# Patient Record
Sex: FEMALE | Race: WHITE | Employment: UNEMPLOYED | URBAN - METROPOLITAN AREA
[De-identification: names, ages, dates, MRNs, and addresses within clinical notes are randomized per-mention and may not be internally consistent; named-entity substitution may affect disease eponyms.]

---

## 2017-01-16 ENCOUNTER — HOSPITAL ENCOUNTER (OUTPATIENT)
Dept: INFUSION THERAPY | Age: 57
Discharge: HOME | End: 2017-01-16
Payer: COMMERCIAL

## 2017-01-16 VITALS
RESPIRATION RATE: 18 BRPM | DIASTOLIC BLOOD PRESSURE: 53 MMHG | SYSTOLIC BLOOD PRESSURE: 93 MMHG | WEIGHT: 208.6 LBS | TEMPERATURE: 98 F | BODY MASS INDEX: 36.95 KG/M2 | HEART RATE: 76 BPM

## 2017-01-16 PROCEDURE — 77030003560 HC NDL HUBR BARD -A

## 2017-01-16 PROCEDURE — 74011250636 HC RX REV CODE- 250/636: Performed by: INTERNAL MEDICINE

## 2017-01-16 PROCEDURE — 96415 CHEMO IV INFUSION ADDL HR: CPT

## 2017-01-16 PROCEDURE — 96413 CHEMO IV INFUSION 1 HR: CPT

## 2017-01-16 RX ORDER — HEPARIN 100 UNIT/ML
500 SYRINGE INTRAVENOUS AS NEEDED
Status: DISPENSED | OUTPATIENT
Start: 2017-01-16 | End: 2017-01-16

## 2017-01-16 RX ORDER — SODIUM CHLORIDE 9 MG/ML
250 INJECTION, SOLUTION INTRAVENOUS ONCE
Status: COMPLETED | OUTPATIENT
Start: 2017-01-16 | End: 2017-01-16

## 2017-01-16 RX ORDER — SODIUM CHLORIDE 0.9 % (FLUSH) 0.9 %
10 SYRINGE (ML) INJECTION AS NEEDED
Status: ACTIVE | OUTPATIENT
Start: 2017-01-16 | End: 2017-01-16

## 2017-01-16 RX ORDER — ACETAMINOPHEN 325 MG/1
650 TABLET ORAL
Status: DISCONTINUED | OUTPATIENT
Start: 2017-01-16 | End: 2017-01-16

## 2017-01-16 RX ORDER — DIPHENHYDRAMINE HCL 50 MG
50 CAPSULE ORAL
Status: DISCONTINUED | OUTPATIENT
Start: 2017-01-16 | End: 2017-01-16

## 2017-01-16 RX ADMIN — Medication 10 ML: at 09:45

## 2017-01-16 RX ADMIN — SODIUM CHLORIDE 250 ML: 900 INJECTION, SOLUTION INTRAVENOUS at 09:45

## 2017-01-16 RX ADMIN — Medication 10 ML: at 12:40

## 2017-01-16 RX ADMIN — INFLIXIMAB 900 MG: 100 INJECTION, POWDER, LYOPHILIZED, FOR SOLUTION INTRAVENOUS at 10:25

## 2017-01-16 RX ADMIN — SODIUM CHLORIDE, PRESERVATIVE FREE 500 UNITS: 5 INJECTION INTRAVENOUS at 12:40

## 2017-01-16 NOTE — PROGRESS NOTES
Problem: Knowledge Deficit  Goal: *Verbalizes understanding and describes medication purposes and frequencies  Outcome: Progressing Towards Goal  Verbalizes/demonstrates understanding of purpose/procedure/potential side effects of remicade.

## 2017-01-16 NOTE — PROGRESS NOTES
Pt arrived ambulatory today at 0925, to receive IV Remicade. Pt tolerated without difficulty. Patient discharged via ambulatory accompanied by self. Instructed to notify physician of any problems, questions or concerns. Allowed opportunity for patient/family to ask questions. Verbalized understanding. Next appointment is Feb 27at 0915 with Lev Reyes.

## 2017-02-27 ENCOUNTER — HOSPITAL ENCOUNTER (OUTPATIENT)
Dept: INFUSION THERAPY | Age: 57
Discharge: HOME | End: 2017-02-27
Payer: COMMERCIAL

## 2017-02-27 VITALS
OXYGEN SATURATION: 97 % | RESPIRATION RATE: 16 BRPM | SYSTOLIC BLOOD PRESSURE: 99 MMHG | TEMPERATURE: 97.4 F | BODY MASS INDEX: 36.67 KG/M2 | DIASTOLIC BLOOD PRESSURE: 63 MMHG | HEART RATE: 73 BPM | WEIGHT: 207 LBS

## 2017-02-27 PROCEDURE — 74011250636 HC RX REV CODE- 250/636: Performed by: INTERNAL MEDICINE

## 2017-02-27 PROCEDURE — 96413 CHEMO IV INFUSION 1 HR: CPT

## 2017-02-27 PROCEDURE — 96415 CHEMO IV INFUSION ADDL HR: CPT

## 2017-02-27 PROCEDURE — 77030003560 HC NDL HUBR BARD -A

## 2017-02-27 RX ORDER — HEPARIN 100 UNIT/ML
500 SYRINGE INTRAVENOUS AS NEEDED
Status: DISPENSED | OUTPATIENT
Start: 2017-02-27 | End: 2017-02-27

## 2017-02-27 RX ORDER — DIPHENHYDRAMINE HCL 50 MG
50 CAPSULE ORAL
Status: ACTIVE | OUTPATIENT
Start: 2017-02-27 | End: 2017-02-27

## 2017-02-27 RX ORDER — SODIUM CHLORIDE 0.9 % (FLUSH) 0.9 %
20 SYRINGE (ML) INJECTION AS NEEDED
Status: DISCONTINUED | OUTPATIENT
Start: 2017-02-27 | End: 2017-03-03 | Stop reason: HOSPADM

## 2017-02-27 RX ORDER — SODIUM CHLORIDE 9 MG/ML
25 INJECTION, SOLUTION INTRAVENOUS ONCE
Status: COMPLETED | OUTPATIENT
Start: 2017-02-27 | End: 2017-02-27

## 2017-02-27 RX ORDER — ACETAMINOPHEN 325 MG/1
650 TABLET ORAL
Status: ACTIVE | OUTPATIENT
Start: 2017-02-27 | End: 2017-02-27

## 2017-02-27 RX ADMIN — INFLIXIMAB 900 MG: 100 INJECTION, POWDER, LYOPHILIZED, FOR SOLUTION INTRAVENOUS at 10:35

## 2017-02-27 RX ADMIN — SODIUM CHLORIDE 25 ML/HR: 900 INJECTION, SOLUTION INTRAVENOUS at 10:05

## 2017-02-27 RX ADMIN — Medication 20 ML: at 12:50

## 2017-02-27 RX ADMIN — SODIUM CHLORIDE, PRESERVATIVE FREE 500 UNITS: 5 INJECTION INTRAVENOUS at 12:50

## 2017-02-27 NOTE — PROGRESS NOTES
Pt arrived ambulatory for remicade, she denies new complaints. Pt took her own premeds this morning. Pt tolerated infusion without incident. Pt dc'd stable, to return to [facility] on 4/10 at 1000.

## 2017-02-27 NOTE — PROGRESS NOTES
Massage THERAPY: Daily Note    Referring Physician: Norma Ayala MD  Medical/Referring Diagnosis: Ulcerative colitis ([facility] [K51.90]   Precautions/Allergies: Codeine  SUBJECTIVE:  Present Symptoms: no discomfort but loves idea of massage    Pre-Treatment Pain: 2/10  Past Medical History:    Ms. Julianna Johnson  has a past medical history of Depression; Disc prolapse; DJD (degenerative joint disease); GERD (gastroesophageal reflux disease); Hypertension; Obesity (BMI 30-39.9); Osteoarthritis; Steroid-induced diabetes mellitus ([facility] (6/5/2013); Steroid-induced diabetes mellitus ([facility] (6/5/2013); and Ulcerative colitis ([facility] She also has no past medical history of DEMENTIA; Difficult intubation; Infectious disease; Malignant hyperthermia due to anesthesia; Nausea & vomiting; Neurological disorder; Pseudocholinesterase deficiency; Sleep disorder; or Unspecified adverse effect of anesthesia. Ms. Julianna Johnson  has a past surgical history that includes inner ear surgery proc unlisted (74,36,36); colonoscopy (2009); tonsillectomy (1971); heent (1974); heent (1971); vascular access (2013); vascular surgery procedure unlist (06/5/2013 Sarai); and  (N/A, 5/11/2016). Current Medications:       Current Outpatient Prescriptions:     predniSONE (STERAPRED DS) 10 mg dose pack, Take  by mouth See Admin Instructions. See administration instruction per 10mg dose pack, Disp: , Rfl:     mesalamine DR (DELZICOL) 400 mg cpDR DR capsule, Take 400 mg by mouth two (2) times a day. Indications: ULCERATIVE COLITIS, Disp: , Rfl:     pravastatin (PRAVACHOL) 80 mg tablet, Take 80 mg by mouth nightly., Disp: , Rfl:     valsartan-hydrochlorothiazide (DIOVAN-HCT) 160-25 mg per tablet, Take 1 Tab by mouth every morning., Disp: , Rfl:     LORazepam (ATIVAN) 1 mg tablet, Take 1 mg by mouth every twelve (12) hours as needed for Anxiety. , Disp: , Rfl:     Dexlansoprazole 60 mg CpDB, Take 60 mg by mouth nightly., Disp: , Rfl:     INFLIXIMAB (REMICADE IV), by IntraVENous route every six (6) weeks. , Disp: , Rfl:     multivitamins-minerals-lutein (CENTRUM SILVER) Tab, Take 1 Tab by mouth nightly., Disp: , Rfl:     Bifidobacterium Infantis (ALIGN) 4 mg cap, Take 1 Tab by mouth every morning., Disp: , Rfl:     ropinirole (REQUIP) 0.5 mg tablet, Take 0.5 mg by mouth nightly. Indications: takes nightly, Disp: , Rfl:     acetaminophen (TYLENOL ARTHRITIS) 650 mg CR tablet, Take 650 mg by mouth every six (6) hours as needed for Pain., Disp: , Rfl:     sertraline (ZOLOFT) 100 mg tablet, Take 100 mg by mouth every morning., Disp: , Rfl:     Current Facility-Administered Medications:     central line flush (saline) syringe 20 mL, 20 mL, InterCATHeter, PRN, Hugo Cedillo MD    heparin (porcine) pf 500 Units, 500 Units, InterCATHeter, PRN, Hugo Cedillo MD    inFLIXimab (REMICADE) 900 mg in 0.9% sodium chloride 250 mL infusion, 900 mg, IntraVENous, [id number], Hugo Cedillo MD, Last Rate: 125 mL/hr at 02/27/17 1035, 900 mg at 02/27/17 1035    acetaminophen (TYLENOL) tablet 650 mg, 650 mg, Oral, Q4H PRN, Hugo Cedillo MD    diphenhydrAMINE (BENADRYL) capsule 50 mg, 50 mg, Oral, Q4H PRN, Hugo Cedillo MD       OBJECTIVE/ASSESSMENT:  Objective Measure: n/a    Observations of Patient:  Enjoyed massage, a bit concerned about rash on legs  Response To Treatment: very positive, enjoyed having feet moisturized   Post-Treatment Pain: 1/10  TREATMENT:    (In addition to Assessment/Re-Assessment sessions the following treatments were rendered)  Treatment Provided:  [x]  Soft tissue massage  []  Healing Touch   Location: bilateral lower legs & feet  Patient Position: seated  Time: 20 minutes    PLAN OF CARE:    []  I will follow up with this patient as needed. [x]  No follow up visit necessary.     Thank you for this referral.  Fabio Bush

## 2017-04-10 ENCOUNTER — HOSPITAL ENCOUNTER (OUTPATIENT)
Dept: INFUSION THERAPY | Age: 57
End: 2017-04-10

## 2017-05-22 ENCOUNTER — HOSPITAL ENCOUNTER (OUTPATIENT)
Dept: INFUSION THERAPY | Age: 57
End: 2017-05-22

## 2017-07-03 ENCOUNTER — APPOINTMENT (OUTPATIENT)
Dept: INFUSION THERAPY | Age: 57
End: 2017-07-03

## 2021-11-18 ENCOUNTER — HOSPITAL ENCOUNTER (OUTPATIENT)
Dept: INFUSION THERAPY | Age: 61
Discharge: HOME | End: 2021-11-18
Payer: COMMERCIAL

## 2021-11-18 VITALS
OXYGEN SATURATION: 98 % | DIASTOLIC BLOOD PRESSURE: 50 MMHG | RESPIRATION RATE: 16 BRPM | TEMPERATURE: 98 F | SYSTOLIC BLOOD PRESSURE: 94 MMHG | HEART RATE: 90 BPM

## 2021-11-18 PROCEDURE — 74011250636 HC RX REV CODE- 250/636: Performed by: PHYSICIAN ASSISTANT

## 2021-11-18 PROCEDURE — 96360 HYDRATION IV INFUSION INIT: CPT

## 2021-11-18 RX ORDER — SODIUM CHLORIDE 0.9 % (FLUSH) 0.9 %
10 SYRINGE (ML) INJECTION AS NEEDED
Status: DISCONTINUED | OUTPATIENT
Start: 2021-11-18 | End: 2021-11-20 | Stop reason: HOSPADM

## 2021-11-18 RX ADMIN — Medication 10 ML: at 17:58

## 2021-11-18 RX ADMIN — Medication 10 ML: at 16:55

## 2021-11-18 RX ADMIN — SODIUM CHLORIDE 1000 ML: 900 INJECTION, SOLUTION INTRAVENOUS at 16:55

## 2021-11-18 NOTE — PROGRESS NOTES
Pt arrived ambulatory to [facility]. Port accessed with good blood return. NS 1 L infusing. Pt has no future appt with [facility] at this time. Port flushed and de accessed. Pt discharge ambulatory.

## 2022-08-08 ENCOUNTER — HOSPITAL ENCOUNTER (INPATIENT)
Age: 62
LOS: 16 days | Discharge: ANOTHER HOSPITAL | DRG: 386 | End: 2022-08-24
Attending: EMERGENCY MEDICINE | Admitting: INTERNAL MEDICINE
Payer: COMMERCIAL

## 2022-08-08 DIAGNOSIS — N18.9 ACUTE RENAL FAILURE SUPERIMPOSED ON CHRONIC KIDNEY DISEASE, UNSPECIFIED CKD STAGE, UNSPECIFIED ACUTE RENAL FAILURE TYPE (HCC): Primary | ICD-10-CM

## 2022-08-08 DIAGNOSIS — E86.0 DEHYDRATION: ICD-10-CM

## 2022-08-08 DIAGNOSIS — K50.118 CROHN'S DISEASE OF LARGE INTESTINE WITH OTHER COMPLICATION (HCC): Chronic | ICD-10-CM

## 2022-08-08 DIAGNOSIS — N17.9 ACUTE RENAL FAILURE SUPERIMPOSED ON CHRONIC KIDNEY DISEASE, UNSPECIFIED CKD STAGE, UNSPECIFIED ACUTE RENAL FAILURE TYPE (HCC): Primary | ICD-10-CM

## 2022-08-08 DIAGNOSIS — E87.6 HYPOKALEMIA: ICD-10-CM

## 2022-08-08 DIAGNOSIS — R19.7 DIARRHEA, UNSPECIFIED TYPE: ICD-10-CM

## 2022-08-08 PROBLEM — I10 HYPERTENSION: Chronic | Status: ACTIVE | Noted: 2022-08-08

## 2022-08-08 PROBLEM — K50.90 CROHN'S DISEASE (HCC): Chronic | Status: ACTIVE | Noted: 2022-08-08

## 2022-08-08 PROBLEM — F32.A DEPRESSION: Chronic | Status: ACTIVE | Noted: 2022-08-08

## 2022-08-08 PROBLEM — E87.1 HYPONATREMIA: Status: ACTIVE | Noted: 2022-08-08

## 2022-08-08 PROBLEM — E78.5 HYPERLIPIDEMIA: Chronic | Status: ACTIVE | Noted: 2022-08-08

## 2022-08-08 LAB
ALBUMIN SERPL-MCNC: 2.2 G/DL (ref 3.2–4.6)
ALBUMIN/GLOB SERPL: 0.4 {RATIO} (ref 1.2–3.5)
ALP SERPL-CCNC: 80 U/L (ref 50–136)
ALT SERPL-CCNC: 14 U/L (ref 12–65)
ANION GAP SERPL CALC-SCNC: 11 MMOL/L (ref 7–16)
ANION GAP SERPL CALC-SCNC: 7 MMOL/L (ref 7–16)
AST SERPL-CCNC: 15 U/L (ref 15–37)
BASOPHILS # BLD: 0 K/UL (ref 0–0.2)
BASOPHILS NFR BLD: 0 % (ref 0–2)
BILIRUB SERPL-MCNC: 0.3 MG/DL (ref 0.2–1.1)
BUN SERPL-MCNC: 52 MG/DL (ref 8–23)
BUN SERPL-MCNC: 52 MG/DL (ref 8–23)
CALCIUM SERPL-MCNC: 8.3 MG/DL (ref 8.3–10.4)
CALCIUM SERPL-MCNC: 8.3 MG/DL (ref 8.3–10.4)
CHLORIDE SERPL-SCNC: 92 MMOL/L (ref 98–107)
CHLORIDE SERPL-SCNC: 95 MMOL/L (ref 98–107)
CO2 SERPL-SCNC: 24 MMOL/L (ref 21–32)
CO2 SERPL-SCNC: 24 MMOL/L (ref 21–32)
CREAT SERPL-MCNC: 3.6 MG/DL (ref 0.6–1)
CREAT SERPL-MCNC: 3.8 MG/DL (ref 0.6–1)
DIFFERENTIAL METHOD BLD: ABNORMAL
EOSINOPHIL # BLD: 0 K/UL (ref 0–0.8)
EOSINOPHIL NFR BLD: 0 % (ref 0.5–7.8)
ERYTHROCYTE [DISTWIDTH] IN BLOOD BY AUTOMATED COUNT: 12.8 % (ref 11.9–14.6)
GLOBULIN SER CALC-MCNC: 4.9 G/DL (ref 2.3–3.5)
GLUCOSE SERPL-MCNC: 96 MG/DL (ref 65–100)
GLUCOSE SERPL-MCNC: 96 MG/DL (ref 65–100)
HCT VFR BLD AUTO: 28.8 % (ref 35.8–46.3)
HGB BLD-MCNC: 10 G/DL (ref 11.7–15.4)
IMM GRANULOCYTES # BLD AUTO: 0.2 K/UL (ref 0–0.5)
IMM GRANULOCYTES NFR BLD AUTO: 2 % (ref 0–5)
IRON SERPL-MCNC: 30 UG/DL (ref 35–150)
LIPASE SERPL-CCNC: 43 U/L (ref 73–393)
LYMPHOCYTES # BLD: 0.6 K/UL (ref 0.5–4.6)
LYMPHOCYTES NFR BLD: 6 % (ref 13–44)
MAGNESIUM SERPL-MCNC: 0.7 MG/DL (ref 1.8–2.4)
MCH RBC QN AUTO: 30.4 PG (ref 26.1–32.9)
MCHC RBC AUTO-ENTMCNC: 34.7 G/DL (ref 31.4–35)
MCV RBC AUTO: 87.5 FL (ref 79.6–97.8)
MONOCYTES # BLD: 0.7 K/UL (ref 0.1–1.3)
MONOCYTES NFR BLD: 8 % (ref 4–12)
NEUTS SEG # BLD: 7.8 K/UL (ref 1.7–8.2)
NEUTS SEG NFR BLD: 84 % (ref 43–78)
NRBC # BLD: 0 K/UL (ref 0–0.2)
PLATELET # BLD AUTO: 262 K/UL (ref 150–450)
PMV BLD AUTO: 8.2 FL (ref 9.4–12.3)
POTASSIUM SERPL-SCNC: 2.8 MMOL/L (ref 3.5–5.1)
POTASSIUM SERPL-SCNC: 3.3 MMOL/L (ref 3.5–5.1)
PROT SERPL-MCNC: 7.1 G/DL (ref 6.3–8.2)
RBC # BLD AUTO: 3.29 M/UL (ref 4.05–5.2)
SODIUM SERPL-SCNC: 126 MMOL/L (ref 136–145)
SODIUM SERPL-SCNC: 127 MMOL/L (ref 136–145)
WBC # BLD AUTO: 9.4 K/UL (ref 4.3–11.1)

## 2022-08-08 PROCEDURE — 80053 COMPREHEN METABOLIC PANEL: CPT

## 2022-08-08 PROCEDURE — 6370000000 HC RX 637 (ALT 250 FOR IP): Performed by: INTERNAL MEDICINE

## 2022-08-08 PROCEDURE — 2580000003 HC RX 258: Performed by: INTERNAL MEDICINE

## 2022-08-08 PROCEDURE — 6370000000 HC RX 637 (ALT 250 FOR IP): Performed by: HOSPITALIST

## 2022-08-08 PROCEDURE — 83540 ASSAY OF IRON: CPT

## 2022-08-08 PROCEDURE — 87324 CLOSTRIDIUM AG IA: CPT

## 2022-08-08 PROCEDURE — 99285 EMERGENCY DEPT VISIT HI MDM: CPT

## 2022-08-08 PROCEDURE — 83735 ASSAY OF MAGNESIUM: CPT

## 2022-08-08 PROCEDURE — 2580000003 HC RX 258: Performed by: EMERGENCY MEDICINE

## 2022-08-08 PROCEDURE — 6360000002 HC RX W HCPCS: Performed by: EMERGENCY MEDICINE

## 2022-08-08 PROCEDURE — 1100000000 HC RM PRIVATE

## 2022-08-08 PROCEDURE — 87046 STOOL CULTR AEROBIC BACT EA: CPT

## 2022-08-08 PROCEDURE — 6360000002 HC RX W HCPCS: Performed by: INTERNAL MEDICINE

## 2022-08-08 PROCEDURE — 82746 ASSAY OF FOLIC ACID SERUM: CPT

## 2022-08-08 PROCEDURE — 36415 COLL VENOUS BLD VENIPUNCTURE: CPT

## 2022-08-08 PROCEDURE — 83690 ASSAY OF LIPASE: CPT

## 2022-08-08 PROCEDURE — 85025 COMPLETE CBC W/AUTO DIFF WBC: CPT

## 2022-08-08 PROCEDURE — 96361 HYDRATE IV INFUSION ADD-ON: CPT

## 2022-08-08 PROCEDURE — 96375 TX/PRO/DX INJ NEW DRUG ADDON: CPT

## 2022-08-08 PROCEDURE — 82728 ASSAY OF FERRITIN: CPT

## 2022-08-08 PROCEDURE — 82607 VITAMIN B-12: CPT

## 2022-08-08 PROCEDURE — 96365 THER/PROPH/DIAG IV INF INIT: CPT

## 2022-08-08 RX ORDER — ONDANSETRON 2 MG/ML
4 INJECTION INTRAMUSCULAR; INTRAVENOUS
Status: COMPLETED | OUTPATIENT
Start: 2022-08-08 | End: 2022-08-08

## 2022-08-08 RX ORDER — IRBESARTAN AND HYDROCHLOROTHIAZIDE 150; 12.5 MG/1; MG/1
1 TABLET, FILM COATED ORAL EVERY OTHER DAY
Status: ON HOLD | COMMUNITY
End: 2022-08-24 | Stop reason: HOSPADM

## 2022-08-08 RX ORDER — MORPHINE SULFATE 4 MG/ML
2 INJECTION INTRAVENOUS ONCE
Status: COMPLETED | OUTPATIENT
Start: 2022-08-08 | End: 2022-08-08

## 2022-08-08 RX ORDER — SODIUM CHLORIDE 0.9 % (FLUSH) 0.9 %
5-40 SYRINGE (ML) INJECTION PRN
Status: DISCONTINUED | OUTPATIENT
Start: 2022-08-08 | End: 2022-08-24 | Stop reason: HOSPADM

## 2022-08-08 RX ORDER — ROPINIROLE 0.5 MG/1
0.5 TABLET, FILM COATED ORAL NIGHTLY
Status: DISCONTINUED | OUTPATIENT
Start: 2022-08-08 | End: 2022-08-24 | Stop reason: HOSPADM

## 2022-08-08 RX ORDER — ONDANSETRON 2 MG/ML
4 INJECTION INTRAMUSCULAR; INTRAVENOUS EVERY 6 HOURS PRN
Status: DISCONTINUED | OUTPATIENT
Start: 2022-08-08 | End: 2022-08-24 | Stop reason: HOSPADM

## 2022-08-08 RX ORDER — BUPROPION HYDROCHLORIDE 150 MG/1
150 TABLET ORAL EVERY MORNING
COMMUNITY

## 2022-08-08 RX ORDER — ONDANSETRON 4 MG/1
4 TABLET, ORALLY DISINTEGRATING ORAL EVERY 8 HOURS PRN
Status: DISCONTINUED | OUTPATIENT
Start: 2022-08-08 | End: 2022-08-24 | Stop reason: HOSPADM

## 2022-08-08 RX ORDER — SODIUM CHLORIDE 0.9 % (FLUSH) 0.9 %
5-40 SYRINGE (ML) INJECTION EVERY 12 HOURS SCHEDULED
Status: DISCONTINUED | OUTPATIENT
Start: 2022-08-08 | End: 2022-08-24 | Stop reason: HOSPADM

## 2022-08-08 RX ORDER — BUPROPION HYDROCHLORIDE 150 MG/1
150 TABLET, EXTENDED RELEASE ORAL EVERY MORNING
Status: DISCONTINUED | OUTPATIENT
Start: 2022-08-09 | End: 2022-08-24 | Stop reason: HOSPADM

## 2022-08-08 RX ORDER — ACETAMINOPHEN 325 MG/1
650 TABLET ORAL EVERY 6 HOURS PRN
Status: DISCONTINUED | OUTPATIENT
Start: 2022-08-08 | End: 2022-08-24 | Stop reason: HOSPADM

## 2022-08-08 RX ORDER — SODIUM CHLORIDE 9 MG/ML
INJECTION, SOLUTION INTRAVENOUS PRN
Status: DISCONTINUED | OUTPATIENT
Start: 2022-08-08 | End: 2022-08-24 | Stop reason: HOSPADM

## 2022-08-08 RX ORDER — ACETAMINOPHEN 650 MG/1
650 SUPPOSITORY RECTAL EVERY 6 HOURS PRN
Status: DISCONTINUED | OUTPATIENT
Start: 2022-08-08 | End: 2022-08-24 | Stop reason: HOSPADM

## 2022-08-08 RX ORDER — SODIUM CHLORIDE, SODIUM LACTATE, POTASSIUM CHLORIDE, AND CALCIUM CHLORIDE .6; .31; .03; .02 G/100ML; G/100ML; G/100ML; G/100ML
1000 INJECTION, SOLUTION INTRAVENOUS
Status: COMPLETED | OUTPATIENT
Start: 2022-08-08 | End: 2022-08-08

## 2022-08-08 RX ORDER — SODIUM CHLORIDE 9 MG/ML
INJECTION, SOLUTION INTRAVENOUS CONTINUOUS
Status: ACTIVE | OUTPATIENT
Start: 2022-08-08 | End: 2022-08-14

## 2022-08-08 RX ORDER — MORPHINE SULFATE 2 MG/ML
2 INJECTION, SOLUTION INTRAMUSCULAR; INTRAVENOUS EVERY 4 HOURS PRN
Status: DISCONTINUED | OUTPATIENT
Start: 2022-08-08 | End: 2022-08-24 | Stop reason: HOSPADM

## 2022-08-08 RX ORDER — TRAMADOL HYDROCHLORIDE 50 MG/1
50 TABLET ORAL EVERY 8 HOURS PRN
COMMUNITY

## 2022-08-08 RX ORDER — PANTOPRAZOLE SODIUM 40 MG/1
40 TABLET, DELAYED RELEASE ORAL
Status: DISCONTINUED | OUTPATIENT
Start: 2022-08-09 | End: 2022-08-24 | Stop reason: HOSPADM

## 2022-08-08 RX ORDER — POLYETHYLENE GLYCOL 3350 17 G/17G
17 POWDER, FOR SOLUTION ORAL DAILY PRN
Status: DISCONTINUED | OUTPATIENT
Start: 2022-08-08 | End: 2022-08-24 | Stop reason: HOSPADM

## 2022-08-08 RX ORDER — SERTRALINE HYDROCHLORIDE 25 MG/1
100 TABLET, FILM COATED ORAL DAILY
Status: DISCONTINUED | OUTPATIENT
Start: 2022-08-09 | End: 2022-08-24 | Stop reason: HOSPADM

## 2022-08-08 RX ORDER — POTASSIUM CHLORIDE 7.45 MG/ML
10 INJECTION INTRAVENOUS ONCE
Status: COMPLETED | OUTPATIENT
Start: 2022-08-08 | End: 2022-08-08

## 2022-08-08 RX ORDER — PRAVASTATIN SODIUM 80 MG/1
80 TABLET ORAL NIGHTLY
Status: DISCONTINUED | OUTPATIENT
Start: 2022-08-09 | End: 2022-08-24 | Stop reason: HOSPADM

## 2022-08-08 RX ADMIN — MORPHINE SULFATE 2 MG: 4 INJECTION INTRAVENOUS at 16:36

## 2022-08-08 RX ADMIN — ROPINIROLE HYDROCHLORIDE 0.5 MG: 0.5 TABLET, FILM COATED ORAL at 21:22

## 2022-08-08 RX ADMIN — ONDANSETRON 4 MG: 2 INJECTION INTRAMUSCULAR; INTRAVENOUS at 16:15

## 2022-08-08 RX ADMIN — MORPHINE SULFATE 2 MG: 2 INJECTION, SOLUTION INTRAMUSCULAR; INTRAVENOUS at 21:22

## 2022-08-08 RX ADMIN — SODIUM CHLORIDE, PRESERVATIVE FREE 5 ML: 5 INJECTION INTRAVENOUS at 21:22

## 2022-08-08 RX ADMIN — POTASSIUM CHLORIDE 10 MEQ: 7.46 INJECTION, SOLUTION INTRAVENOUS at 16:55

## 2022-08-08 RX ADMIN — NYSTATIN 500000 UNITS: 100000 SUSPENSION ORAL at 21:22

## 2022-08-08 RX ADMIN — SODIUM CHLORIDE, POTASSIUM CHLORIDE, SODIUM LACTATE AND CALCIUM CHLORIDE 1000 ML: 600; 310; 30; 20 INJECTION, SOLUTION INTRAVENOUS at 16:15

## 2022-08-08 RX ADMIN — SODIUM CHLORIDE: 9 INJECTION, SOLUTION INTRAVENOUS at 20:00

## 2022-08-08 ASSESSMENT — ENCOUNTER SYMPTOMS
RECENT COUGH: 0
VOMITING: 0
DIARRHEA: 1
ABDOMINAL PAIN: 1

## 2022-08-08 ASSESSMENT — PAIN SCALES - GENERAL
PAINLEVEL_OUTOF10: 10
PAINLEVEL_OUTOF10: 9
PAINLEVEL_OUTOF10: 4

## 2022-08-08 ASSESSMENT — PAIN DESCRIPTION - DESCRIPTORS: DESCRIPTORS: ACHING

## 2022-08-08 ASSESSMENT — PAIN - FUNCTIONAL ASSESSMENT: PAIN_FUNCTIONAL_ASSESSMENT: 0-10

## 2022-08-08 ASSESSMENT — PAIN DESCRIPTION - ORIENTATION: ORIENTATION: MID

## 2022-08-08 ASSESSMENT — PAIN DESCRIPTION - LOCATION: LOCATION: ABDOMEN

## 2022-08-08 NOTE — ED PROVIDER NOTES
days  Timing:  Intermittent  Progression:  Worsening  Relieved by:  Nothing  Worsened by:  Nothing  Ineffective treatments:  Anti-motility medications  Associated symptoms: abdominal pain (Mostly described as cramping)    Associated symptoms: no arthralgias, no chills, no recent cough, no diaphoresis, no fever, no headaches, no myalgias, no URI and no vomiting          Review of Systems   Constitutional:  Negative for chills, diaphoresis and fever. Gastrointestinal:  Positive for abdominal pain (Mostly described as cramping) and diarrhea. Negative for vomiting. Musculoskeletal:  Negative for arthralgias and myalgias. Neurological:  Negative for headaches. All other systems reviewed and are negative. Past Medical History:   Diagnosis Date    Depression     managed with medication     Disc prolapse     L4 to L5    DJD (degenerative joint disease)     GERD (gastroesophageal reflux disease)     managed with medication     Hypertension     managed with medication     Obesity (BMI 30-39. 9)     BMI 31.5    Osteoarthritis     Steroid-induced diabetes mellitus ([address] 6/5/2013    Steroid-induced diabetes mellitus ([address] 6/5/2013    no present medication needed     Ulcerative colitis ([address]     managed with medication         Past Surgical History:   Procedure Laterality Date    COLONOSCOPY  2009    showed UC    FLEXIBLE SIGMOIDOSCOPY N/A 5/11/2016    SIGMOIDOSCOPY FLEXIBLE/   BMI 36 performed by Jazmin Elizabeth MD at [facility]    left mastoidectomy    HEENT  1971    tympanoplasty    INNER EAR SURGERY PROC UNLISTED  80,60,80    mastoid cyst +TM repair-hearing loss, left ear    TONSILLECTOMY  1971         VASCULAR SURGERY  06/5/2013 [place]    port placement    VASCULAR SURGERY  2013    port placement for remicade        Family History   Problem Relation Age of Onset    Osteoarthritis Brother     Heart Disease Father     Hypertension Father     Breast Cancer Neg Hx     Diabetes Mother         type I Social History     Socioeconomic History    Marital status:    Tobacco Use    Smoking status: Every Day     Packs/day: 1.00     Types: Cigarettes    Smokeless tobacco: Never   Substance and Sexual Activity    Alcohol use: No    Drug use: No   Social History Narrative    PATIENT IS , NOT WORKING OUTSIDE OF HOME. NO CHILDREN. PATIENT IS AN ONLY CHILD. Codeine     Previous Medications    ACETAMINOPHEN (TYLENOL) 650 MG EXTENDED RELEASE TABLET    Take 650 mg by mouth every 6 hours as needed    DEXLANSOPRAZOLE (DEXILANT) 60 MG CPDR DELAYED RELEASE CAPSULE    Take 60 mg by mouth    LORAZEPAM (ATIVAN) 1 MG TABLET    Take 1 mg by mouth. MESALAMINE (DELZICOL) 400 MG CPDR DELAYED RELEASE CAPSULE    Take 400 mg by mouth 2 times daily    PRAVASTATIN (PRAVACHOL) 80 MG TABLET    Take 80 mg by mouth    PREDNISONE 10 MG (21) TBPK    Take by mouth See Admin Instructions    PROBIOTIC PRODUCT (ACIDOPHILUS PROBIOTIC) CAPS CAPSULE    Take 1 tablet by mouth    ROPINIROLE (REQUIP) 0.5 MG TABLET    Take 0.5 mg by mouth    SERTRALINE (ZOLOFT) 100 MG TABLET    Take 100 mg by mouth    VALSARTAN-HYDROCHLOROTHIAZIDE (DIOVAN-HCT) 160-25 MG PER TABLET    Take 1 tablet by mouth        Vitals signs and nursing note reviewed. Patient Vitals for the past 4 hrs:   Pulse Resp BP SpO2   08/08/22 1630 -- -- 111/61 100 %   08/08/22 1621 77 16 107/64 100 %   08/08/22 1525 -- -- 102/60 99 %          Physical Exam  Vitals and nursing note reviewed. Constitutional:       General: She is in acute distress. HENT:      Head: Normocephalic and atraumatic. Nose: Nose normal.      Mouth/Throat:      Mouth: Mucous membranes are moist.   Eyes:      Extraocular Movements: Extraocular movements intact. Conjunctiva/sclera: Conjunctivae normal.      Pupils: Pupils are equal, round, and reactive to light. Cardiovascular:      Rate and Rhythm: Normal rate and regular rhythm. Heart sounds: No murmur heard.   Pulmonary: Effort: Pulmonary effort is normal.      Breath sounds: Normal breath sounds. Abdominal:      General: Abdomen is flat. There is no distension. Palpations: There is no mass. Tenderness: There is no abdominal tenderness. There is no right CVA tenderness, left CVA tenderness, guarding or rebound. Hernia: No hernia is present. Musculoskeletal:         General: Normal range of motion. Cervical back: Normal range of motion and neck supple. Skin:     General: Skin is warm and dry. Neurological:      General: No focal deficit present. Mental Status: She is alert and oriented to person, place, and time. Psychiatric:         Mood and Affect: Mood and affect normal.         Speech: Speech normal.        Procedures    The patient was observed in the ED. on review of old records, creatinine is gone from 1.6 to in December 2020 20 to 1.93 on 6/27/2022, essentially doubling that value today. Patient was hydrated with lactated Ringer's, given Zofran for nausea and a small amount of morphine for pain. Case was discussed with hospitalist who will admit.     Results Reviewed:      Recent Results (from the past 24 hour(s))   CBC with Diff    Collection Time: 08/08/22 12:32 PM   Result Value Ref Range    WBC 9.4 4.3 - 11.1 K/uL    RBC 3.29 (L) 4.05 - 5.2 M/uL    Hemoglobin 10.0 (L) 11.7 - 15.4 g/dL    Hematocrit 28.8 (L) 35.8 - 46.3 %    MCV 87.5 79.6 - 97.8 FL    MCH 30.4 26.1 - 32.9 PG    MCHC 34.7 31.4 - 35.0 g/dL    RDW 12.8 11.9 - 14.6 %    Platelets 320 105 - 613 K/uL    MPV 8.2 (L) 9.4 - 12.3 FL    nRBC 0.00 0.0 - 0.2 K/uL    Differential Type AUTOMATED      Seg Neutrophils 84 (H) 43 - 78 %    Lymphocytes 6 (L) 13 - 44 %    Monocytes 8 4.0 - 12.0 %    Eosinophils % 0 (L) 0.5 - 7.8 %    Basophils 0 0.0 - 2.0 %    Immature Granulocytes 2 0.0 - 5.0 %    Segs Absolute 7.8 1.7 - 8.2 K/UL    Absolute Lymph # 0.6 0.5 - 4.6 K/UL    Absolute Mono # 0.7 0.1 - 1.3 K/UL    Absolute Eos # 0.0 0.0 - 0.8 errors.      Leana Parker MD  08/08/22 9216

## 2022-08-08 NOTE — ED NOTES
TRANSFER - OUT REPORT:    Verbal report given to 8th floor RN on Polina Payne  being transferred to [address] [place] [address] for routine progression of patient care       Report consisted of patient's Situation, Background, Assessment and   Recommendations(SBAR). Information from the following report(s) Nurse Handoff Report was reviewed with the receiving nurse. Lines:   Peripheral IV 08/08/22 Right Antecubital (Active)   Site Assessment Clean, dry & intact 08/08/22 1233   Line Status Blood return noted; Flushed;Normal saline locked 08/08/22 [address] pulled back 08/08/22 1233   Phlebitis Assessment No symptoms 08/08/22 1233   Infiltration Assessment 0 08/08/22 1233   Alcohol Cap Used No 08/08/22 1233   Dressing Status New dressing applied;Clean, dry & intact 08/08/22 1233   Dressing Type Transparent 08/08/22 1233        Opportunity for questions and clarification was provided.       Patient transported with:  [id number] Jenny DOHERTY RN  08/08/22 9294

## 2022-08-08 NOTE — PROGRESS NOTES
TRANSFER - IN REPORT:    Verbal report received from jessica(name) on Enriqueta Moore  being received from er(unit) for routine progression of patient care      Report consisted of patients Situation, Background, Assessment and   Recommendations(SBAR). Information from the following report(s) ED SBAR was reviewed with the receiving nurse. Opportunity for questions and clarification was provided.       Awaiting arrival, report to oncoming shift

## 2022-08-08 NOTE — ED TRIAGE NOTES
Patient to triage with mask in place. Patient reports nausea, abd pain and weakness x 3 weeks. Hx of crohn's.

## 2022-08-08 NOTE — H&P
Hospitalist History and Physical   Admit Date:  2022  3:17 PM   Name:  Enriqueta Palma   Age:  65 y.o. Sex:  female  :  1960   MRN:  [id number]   Room:  [place] [address]    Presenting Complaint: Nausea     Reason(s) for Admission: JULIETH (acute kidney injury) ([facility] [N17.9]     History of Present Illness:       Enriqueta Palma is a 65 y.o. female with medical history of crohns disease, HTN, CKD3, depression, HLP, RLS, who is evaluated with complaints os several weeks of nausea, emesis and abdominal pain. She was seen for possible crohn's flare by GI last week. She was sent for CT chest/ AP but is not aware of the results. She did stool studies. Not improving at home with Pedialyte. She has ongoing metallic taste, anorexia, weight loss, dry skin, abdominal pain , decreased urination, diarrhea, nausea. Some dysuria. She has been mostly in the bed due to weakness, feels shaky. Has rectal spasms, burping and hiccups. Denies rectal bleeding. No fever. She is out of her [place] for 5 days. She is followed by nephrology but not seen by 4-5 years. She thinks her renal function was about 40 %. Prior creatinine <2.0. today 3.80. potassium 2.8, sodium 127. HGB 10.0. FULL CODE     Kamaljit Ma [phone]          Review of Systems:  10 systems reviewed and negative except as noted in HPI. - no vision change, no ear or throat pain, no chest pain no dyspnea or cough         Assessment & Plan:     Principal Problem:    JULIETH (acute kidney injury) ([facility]  Plan:   CKD (chronic kidney disease) stage 3, GFR 30-59 ml/min ([facility])  Plan:   Creatinine up to 3.80  Hydrate  cc/hr and follow BMP  Check UA  Consult nephrology        Active Problems:    Crohn's disease ([facility]  Plan:    With flare:  notified GI, possibly steroids once cdiff resulted   Had CTs last week  She is out of her xeljanz  Check cdiff and stool culture   Hydrate           Hyponatremia  Plan:   Na 127  Hydrate and repeat lab Hypertension  Plan:   Holding meds due to acute hypotension and JULIETH             Hyperlipidemia  Plan:   Resume statin         Depression  Plan:   Resume Wellbutrin and zoloft             RLS (restless legs syndrome)  Plan:   Hold requip with JULIETH               Acute hypokalemia  Plan:   K 2.8  Replace   Repeat lab with magnesium             Anemia  Plan:   HGB 10.0  Check occult stool, iron panel, b12, folate         Thrush: Add nystatin           Dispo/Discharge Planning:     Pending     Diet: Diet NPO  VTE ppx: SCD  Code status: No Order    Hospital Problems:  Principal Problem:    JULIETH (acute kidney injury) ([facility]  Active Problems:    Crohn's disease ([facility]    Hyponatremia    Hypertension    Hyperlipidemia    Depression    RLS (restless legs syndrome)    CKD (chronic kidney disease) stage 3, GFR 30-59 ml/min ([facility])    Acute hypokalemia    Anemia    Essential hypertension, benign  Resolved Problems:    * No resolved hospital problems. *       Past History:     Past Medical History:   Diagnosis Date    Depression     managed with medication     Disc prolapse     L4 to L5    DJD (degenerative joint disease)     GERD (gastroesophageal reflux disease)     managed with medication     Hypertension     managed with medication     Obesity (BMI 30-39. 9)     BMI 31.5    Osteoarthritis     Steroid-induced diabetes mellitus ([address] 6/5/2013    Steroid-induced diabetes mellitus ([address] 6/5/2013    no present medication needed     Ulcerative colitis ([address]     managed with medication        Past Surgical History:   Procedure Laterality Date    COLONOSCOPY  2009    showed UC    FLEXIBLE SIGMOIDOSCOPY N/A 5/11/2016    SIGMOIDOSCOPY FLEXIBLE/   BMI 36 performed by Claudell Echevaria, MD at [facility] 10    left mastoidectomy    HEENT  1971    tympanoplasty    INNER EAR SURGERY PROC UNLISTED  41,53,71    mastoid cyst +TM repair-hearing loss, left ear    [address]  06/5/2013 [facility] placement    VASCULAR SURGERY  2013    port placement for remicade        Social History     Tobacco Use    Smoking status: Every Day     Packs/day: 1.00     Types: Cigarettes    Smokeless tobacco: Never   Substance Use Topics    Alcohol use: No      Social History     Substance and Sexual Activity   Drug Use No       Family History   Problem Relation Age of Onset    Osteoarthritis Brother     Heart Disease Father     Hypertension Father     Breast Cancer Neg Hx     Diabetes Mother         type I            There is no immunization history on file for this patient. Allergies   Allergen Reactions    Codeine Nausea Only     Prior to Admit Medications:  Current Outpatient Medications   Medication Instructions    acetaminophen (TYLENOL) 650 mg, Oral, EVERY 6 HOURS PRN    buPROPion (WELLBUTRIN XL) 150 mg, Oral, EVERY MORNING    dexlansoprazole (DEXILANT) 60 mg, Oral    irbesartan-hydroCHLOROthiazide (AVALIDE) 150-12.5 MG per tablet 1 tablet, Oral, DAILY    LORazepam (ATIVAN) 1 mg, Oral    mesalamine (DELZICOL) 400 mg, Oral, 2 TIMES DAILY    pravastatin (PRAVACHOL) 80 mg, Oral    predniSONE 10 MG (21) TBPK Oral, SEE ADMIN INSTRUCTIONS    Probiotic Product (ACIDOPHILUS PROBIOTIC) CAPS capsule 1 tablet, Oral    rOPINIRole (REQUIP) 0.5 mg, Oral    sertraline (ZOLOFT) 100 mg, Oral    traMADol (ULTRAM) 50 mg, Oral, EVERY 8 HOURS PRN    valsartan-hydroCHLOROthiazide (DIOVAN-HCT) 160-25 MG per tablet 1 tablet, Oral         Objective:   Patient Vitals for the past 24 hrs:   Temp Pulse Resp BP SpO2   08/08/22 1730 -- -- -- 110/73 100 %   08/08/22 1630 -- -- -- 111/61 100 %   08/08/22 1621 -- 77 16 107/64 100 %   08/08/22 1525 -- -- -- 102/60 99 %   08/08/22 1227 97.5 °F (36.4 °C) 96 18 (!) 86/54 100 %       Oxygen Therapy  SpO2: 100 %    Estimated body mass index is 21.26 kg/m² as calculated from the following:    Height as of this encounter: 5' 3\" (1.6 m). Weight as of this encounter: 120 lb (54.4 kg).   No intake or output data in the 24 hours ending 08/08/22 1801      Physical Exam:    Blood pressure 110/73, pulse 77, temperature 97.5 °F (36.4 °C), temperature source Oral, resp. rate 16, height 5' 3\" (1.6 m), weight 120 lb (54.4 kg), SpO2 100 %. General:    Elderly, no distress, alert , pleasant   Head:  Normocephalic, atraumatic  Eyes:  Sclerae appear normal.  Pupils equally round. ENT:  Nares appear normal, no drainage. Dry  oral mucosa with thrush   Neck:  No restricted ROM. Trachea midline   CV:   RRR. No m/r/g. No jugular venous distension. No edema   Lungs:   CTAB. No wheezing, rhonchi, or rales. Symmetric expansion. Abdomen: Bowel sounds present. Soft, tender RLQ, nondistended. Extremities: No cyanosis or clubbing. No edema  Skin:     No rashes and normal coloration. Warm and  very dry. Neuro:   grossly intact. Psych:  Normal mood and affect.       I have personally reviewed labs and tests showing:  Recent Labs:  Recent Results (from the past 24 hour(s))   CBC with Diff    Collection Time: 08/08/22 12:32 PM   Result Value Ref Range    WBC 9.4 4.3 - 11.1 K/uL    RBC 3.29 (L) 4.05 - 5.2 M/uL    Hemoglobin 10.0 (L) 11.7 - 15.4 g/dL    Hematocrit 28.8 (L) 35.8 - 46.3 %    MCV 87.5 79.6 - 97.8 FL    MCH 30.4 26.1 - 32.9 PG    MCHC 34.7 31.4 - 35.0 g/dL    RDW 12.8 11.9 - 14.6 %    Platelets 349 182 - 043 K/uL    MPV 8.2 (L) 9.4 - 12.3 FL    nRBC 0.00 0.0 - 0.2 K/uL    Differential Type AUTOMATED      Seg Neutrophils 84 (H) 43 - 78 %    Lymphocytes 6 (L) 13 - 44 %    Monocytes 8 4.0 - 12.0 %    Eosinophils % 0 (L) 0.5 - 7.8 %    Basophils 0 0.0 - 2.0 %    Immature Granulocytes 2 0.0 - 5.0 %    Segs Absolute 7.8 1.7 - 8.2 K/UL    Absolute Lymph # 0.6 0.5 - 4.6 K/UL    Absolute Mono # 0.7 0.1 - 1.3 K/UL    Absolute Eos # 0.0 0.0 - 0.8 K/UL    Basophils Absolute 0.0 0.0 - 0.2 K/UL    Absolute Immature Granulocyte 0.2 0.0 - 0.5 K/UL   CMP    Collection Time: 08/08/22 12:32 PM   Result Value Ref Range    Sodium 127 (L) 136 - 145 mmol/L    Potassium 2.8 (L) 3.5 - 5.1 mmol/L    Chloride 92 (L) 98 - 107 mmol/L    CO2 24 21 - 32 mmol/L    Anion Gap 11 7 - 16 mmol/L    Glucose 96 65 - 100 mg/dL    BUN 52 (H) 8 - 23 MG/DL    Creatinine 3.80 (H) 0.6 - 1.0 MG/DL    GFR  16 (L) >60 ml/min/1.73m2    GFR Non- 13 (L) >60 ml/min/1.73m2    Calcium 8.3 8.3 - 10.4 MG/DL    Total Bilirubin 0.3 0.2 - 1.1 MG/DL    ALT 14 12 - 65 U/L    AST 15 15 - 37 U/L    Alk Phosphatase 80 50 - 136 U/L    Total Protein 7.1 6.3 - 8.2 g/dL    Albumin 2.2 (L) 3.2 - 4.6 g/dL    Globulin 4.9 (H) 2.3 - 3.5 g/dL    Albumin/Globulin Ratio 0.4 (L) 1.2 - 3.5     Lipase    Collection Time: 08/08/22 12:32 PM   Result Value Ref Range    Lipase 43 (L) 73 - 393 U/L       I have personally reviewed imaging studies showing:  No results found. Echocardiogram:  No results found for this or any previous visit. Meds previously ordered:  Orders Placed This Encounter   Medications    lactated ringers bolus    ondansetron (ZOFRAN) injection 4 mg    morphine injection 2 mg    potassium chloride 10 mEq/100 mL IVPB (Peripheral Line)           Signed:  Kingston Walsh MD    Part of this note may have been written by using a voice dictation software. The note has been proof read but may still contain some grammatical/other typographical errors.

## 2022-08-09 ENCOUNTER — APPOINTMENT (OUTPATIENT)
Dept: ULTRASOUND IMAGING | Age: 62
DRG: 386 | End: 2022-08-09
Payer: COMMERCIAL

## 2022-08-09 LAB
ALBUMIN SERPL-MCNC: 2 G/DL (ref 3.2–4.6)
ALBUMIN/GLOB SERPL: 0.5 {RATIO} (ref 1.2–3.5)
ALP SERPL-CCNC: 75 U/L (ref 50–136)
ALT SERPL-CCNC: 12 U/L (ref 12–65)
ANION GAP SERPL CALC-SCNC: 8 MMOL/L (ref 7–16)
APPEARANCE UR: ABNORMAL
AST SERPL-CCNC: 10 U/L (ref 15–37)
BACTERIA URNS QL MICRO: ABNORMAL /HPF
BASOPHILS # BLD: 0 K/UL (ref 0–0.2)
BASOPHILS NFR BLD: 1 % (ref 0–2)
BILIRUB SERPL-MCNC: 0.3 MG/DL (ref 0.2–1.1)
BILIRUB UR QL: NEGATIVE
BUN SERPL-MCNC: 49 MG/DL (ref 8–23)
C DIFF GDH STL QL: NORMAL
C DIFF TOX A+B STL QL IA: NORMAL
C DIFF TOXIN INTERPRETATION: NORMAL
CALCIUM SERPL-MCNC: 7.7 MG/DL (ref 8.3–10.4)
CHLORIDE SERPL-SCNC: 96 MMOL/L (ref 98–107)
CK SERPL-CCNC: 70 U/L (ref 21–215)
CLINICAL CONSIDERATION: NORMAL
CO2 SERPL-SCNC: 26 MMOL/L (ref 21–32)
COLOR UR: ABNORMAL
CREAT SERPL-MCNC: 3 MG/DL (ref 0.6–1)
CREAT UR-MCNC: 63 MG/DL
CREAT UR-MCNC: 64 MG/DL
DIFFERENTIAL METHOD BLD: ABNORMAL
EOSINOPHIL # BLD: 0 K/UL (ref 0–0.8)
EOSINOPHIL NFR BLD: 0 % (ref 0.5–7.8)
EPI CELLS #/AREA URNS HPF: ABNORMAL /HPF
ERYTHROCYTE [DISTWIDTH] IN BLOOD BY AUTOMATED COUNT: 12.8 % (ref 11.9–14.6)
FERRITIN SERPL-MCNC: 844 NG/ML (ref 8–388)
FOLATE SERPL-MCNC: 37.3 NG/ML (ref 3.1–17.5)
GLOBULIN SER CALC-MCNC: 4.4 G/DL (ref 2.3–3.5)
GLUCOSE SERPL-MCNC: 88 MG/DL (ref 65–100)
GLUCOSE UR STRIP.AUTO-MCNC: NEGATIVE MG/DL
HCT VFR BLD AUTO: 25.8 % (ref 35.8–46.3)
HGB BLD-MCNC: 8.8 G/DL (ref 11.7–15.4)
HGB UR QL STRIP: NEGATIVE
IMM GRANULOCYTES # BLD AUTO: 0.1 K/UL (ref 0–0.5)
IMM GRANULOCYTES NFR BLD AUTO: 1 % (ref 0–5)
IRON SATN MFR SERPL: 15 %
IRON SERPL-MCNC: 15 UG/DL (ref 35–150)
IRON SERPL-MCNC: 20 UG/DL (ref 35–150)
KETONES UR QL STRIP.AUTO: ABNORMAL MG/DL
LEUKOCYTE ESTERASE UR QL STRIP.AUTO: ABNORMAL
LYMPHOCYTES # BLD: 0.4 K/UL (ref 0.5–4.6)
LYMPHOCYTES NFR BLD: 7 % (ref 13–44)
MAGNESIUM SERPL-MCNC: 0.6 MG/DL (ref 1.8–2.4)
MCH RBC QN AUTO: 30.1 PG (ref 26.1–32.9)
MCHC RBC AUTO-ENTMCNC: 34.1 G/DL (ref 31.4–35)
MCV RBC AUTO: 88.4 FL (ref 79.6–97.8)
MONOCYTES # BLD: 0.4 K/UL (ref 0.1–1.3)
MONOCYTES NFR BLD: 8 % (ref 4–12)
NEUTS SEG # BLD: 4.5 K/UL (ref 1.7–8.2)
NEUTS SEG NFR BLD: 83 % (ref 43–78)
NITRITE UR QL STRIP.AUTO: NEGATIVE
NRBC # BLD: 0 K/UL (ref 0–0.2)
OSMOLALITY SERPL: 281 MOSM/KG H2O (ref 275–295)
OTHER OBSERVATIONS: ABNORMAL
PH UR STRIP: 5 [PH] (ref 5–9)
PHOSPHATE SERPL-MCNC: 2.5 MG/DL (ref 2.3–3.7)
PLATELET # BLD AUTO: 205 K/UL (ref 150–450)
PMV BLD AUTO: 8.5 FL (ref 9.4–12.3)
POTASSIUM SERPL-SCNC: 2.7 MMOL/L (ref 3.5–5.1)
PROT SERPL-MCNC: 6.4 G/DL (ref 6.3–8.2)
PROT UR STRIP-MCNC: 30 MG/DL
PROT UR-MCNC: 45 MG/DL
PROT/CREAT UR-RTO: 0.7
RBC # BLD AUTO: 2.92 M/UL (ref 4.05–5.2)
RBC #/AREA URNS HPF: ABNORMAL /HPF
REFLEX: NORMAL
SODIUM SERPL-SCNC: 130 MMOL/L (ref 136–145)
SODIUM UR-SCNC: 25 MMOL/L
SP GR UR REFRACTOMETRY: 1.01 (ref 1–1.02)
TIBC SERPL-MCNC: 102 UG/DL (ref 250–450)
TRANSFERRIN SERPL-MCNC: 90 MG/DL (ref 202–364)
TSH, 3RD GENERATION: 0.84 UIU/ML (ref 0.36–3.74)
UROBILINOGEN UR QL STRIP.AUTO: 0.2 EU/DL (ref 0.2–1)
VIT B12 SERPL-MCNC: 3297 PG/ML (ref 193–986)
WBC # BLD AUTO: 5.4 K/UL (ref 4.3–11.1)
WBC URNS QL MICRO: ABNORMAL /HPF

## 2022-08-09 PROCEDURE — 6360000002 HC RX W HCPCS: Performed by: INTERNAL MEDICINE

## 2022-08-09 PROCEDURE — 36415 COLL VENOUS BLD VENIPUNCTURE: CPT

## 2022-08-09 PROCEDURE — 84300 ASSAY OF URINE SODIUM: CPT

## 2022-08-09 PROCEDURE — 2580000003 HC RX 258: Performed by: INTERNAL MEDICINE

## 2022-08-09 PROCEDURE — 76770 US EXAM ABDO BACK WALL COMP: CPT

## 2022-08-09 PROCEDURE — 87086 URINE CULTURE/COLONY COUNT: CPT

## 2022-08-09 PROCEDURE — 82570 ASSAY OF URINE CREATININE: CPT

## 2022-08-09 PROCEDURE — 83540 ASSAY OF IRON: CPT

## 2022-08-09 PROCEDURE — 84156 ASSAY OF PROTEIN URINE: CPT

## 2022-08-09 PROCEDURE — 82550 ASSAY OF CK (CPK): CPT

## 2022-08-09 PROCEDURE — 6370000000 HC RX 637 (ALT 250 FOR IP): Performed by: INTERNAL MEDICINE

## 2022-08-09 PROCEDURE — 84466 ASSAY OF TRANSFERRIN: CPT

## 2022-08-09 PROCEDURE — 81001 URINALYSIS AUTO W/SCOPE: CPT

## 2022-08-09 PROCEDURE — 6370000000 HC RX 637 (ALT 250 FOR IP): Performed by: HOSPITALIST

## 2022-08-09 PROCEDURE — 97535 SELF CARE MNGMENT TRAINING: CPT

## 2022-08-09 PROCEDURE — 1100000003 HC PRIVATE W/ TELEMETRY

## 2022-08-09 PROCEDURE — 84443 ASSAY THYROID STIM HORMONE: CPT

## 2022-08-09 PROCEDURE — 83935 ASSAY OF URINE OSMOLALITY: CPT

## 2022-08-09 PROCEDURE — 97161 PT EVAL LOW COMPLEX 20 MIN: CPT

## 2022-08-09 PROCEDURE — 97530 THERAPEUTIC ACTIVITIES: CPT

## 2022-08-09 PROCEDURE — 80053 COMPREHEN METABOLIC PANEL: CPT

## 2022-08-09 PROCEDURE — 85025 COMPLETE CBC W/AUTO DIFF WBC: CPT

## 2022-08-09 PROCEDURE — 83930 ASSAY OF BLOOD OSMOLALITY: CPT

## 2022-08-09 PROCEDURE — 83735 ASSAY OF MAGNESIUM: CPT

## 2022-08-09 PROCEDURE — 97165 OT EVAL LOW COMPLEX 30 MIN: CPT

## 2022-08-09 PROCEDURE — 84100 ASSAY OF PHOSPHORUS: CPT

## 2022-08-09 PROCEDURE — 6370000000 HC RX 637 (ALT 250 FOR IP): Performed by: PHYSICIAN ASSISTANT

## 2022-08-09 RX ORDER — POTASSIUM CHLORIDE 7.45 MG/ML
10 INJECTION INTRAVENOUS EVERY 4 HOURS
Status: DISPENSED | OUTPATIENT
Start: 2022-08-09 | End: 2022-08-10

## 2022-08-09 RX ORDER — DIPHENOXYLATE HYDROCHLORIDE AND ATROPINE SULFATE 2.5; .025 MG/1; MG/1
1 TABLET ORAL 4 TIMES DAILY
Status: DISCONTINUED | OUTPATIENT
Start: 2022-08-09 | End: 2022-08-23

## 2022-08-09 RX ORDER — MAGNESIUM SULFATE IN WATER 40 MG/ML
4000 INJECTION, SOLUTION INTRAVENOUS ONCE
Status: COMPLETED | OUTPATIENT
Start: 2022-08-09 | End: 2022-08-09

## 2022-08-09 RX ORDER — POTASSIUM CHLORIDE 7.45 MG/ML
10 INJECTION INTRAVENOUS ONCE
Status: COMPLETED | OUTPATIENT
Start: 2022-08-10 | End: 2022-08-10

## 2022-08-09 RX ORDER — METHYLPREDNISOLONE SODIUM SUCCINATE 125 MG/2ML
40 INJECTION, POWDER, LYOPHILIZED, FOR SOLUTION INTRAMUSCULAR; INTRAVENOUS EVERY 12 HOURS
Status: DISCONTINUED | OUTPATIENT
Start: 2022-08-09 | End: 2022-08-11

## 2022-08-09 RX ADMIN — HYOSCYAMINE SULFATE 125 MCG: 0.12 TABLET ORAL at 20:51

## 2022-08-09 RX ADMIN — HYOSCYAMINE SULFATE 125 MCG: 0.12 TABLET ORAL at 15:20

## 2022-08-09 RX ADMIN — DIPHENOXYLATE HYDROCHLORIDE AND ATROPINE SULFATE 1 TABLET: 2.5; .025 TABLET ORAL at 14:30

## 2022-08-09 RX ADMIN — SODIUM CHLORIDE, PRESERVATIVE FREE 10 ML: 5 INJECTION INTRAVENOUS at 20:52

## 2022-08-09 RX ADMIN — METHYLPREDNISOLONE SODIUM SUCCINATE 40 MG: 125 INJECTION, POWDER, FOR SOLUTION INTRAMUSCULAR; INTRAVENOUS at 20:51

## 2022-08-09 RX ADMIN — METHYLPREDNISOLONE SODIUM SUCCINATE 40 MG: 125 INJECTION, POWDER, FOR SOLUTION INTRAMUSCULAR; INTRAVENOUS at 09:35

## 2022-08-09 RX ADMIN — HYOSCYAMINE SULFATE 125 MCG: 0.12 TABLET ORAL at 09:36

## 2022-08-09 RX ADMIN — NYSTATIN 500000 UNITS: 100000 SUSPENSION ORAL at 20:51

## 2022-08-09 RX ADMIN — MAGNESIUM SULFATE HEPTAHYDRATE 4000 MG: 40 INJECTION, SOLUTION INTRAVENOUS at 18:14

## 2022-08-09 RX ADMIN — MORPHINE SULFATE 2 MG: 2 INJECTION, SOLUTION INTRAMUSCULAR; INTRAVENOUS at 09:42

## 2022-08-09 RX ADMIN — NYSTATIN 500000 UNITS: 100000 SUSPENSION ORAL at 18:16

## 2022-08-09 RX ADMIN — SERTRALINE HYDROCHLORIDE 100 MG: 25 TABLET ORAL at 09:36

## 2022-08-09 RX ADMIN — SODIUM CHLORIDE, PRESERVATIVE FREE 10 ML: 5 INJECTION INTRAVENOUS at 09:37

## 2022-08-09 RX ADMIN — PRAVASTATIN SODIUM 80 MG: 80 TABLET ORAL at 20:51

## 2022-08-09 RX ADMIN — BUPROPION HYDROCHLORIDE 150 MG: 150 TABLET, EXTENDED RELEASE ORAL at 09:37

## 2022-08-09 RX ADMIN — POTASSIUM CHLORIDE 10 MEQ: 7.46 INJECTION, SOLUTION INTRAVENOUS at 20:53

## 2022-08-09 RX ADMIN — PANTOPRAZOLE SODIUM 40 MG: 40 TABLET, DELAYED RELEASE ORAL at 06:04

## 2022-08-09 RX ADMIN — MORPHINE SULFATE 2 MG: 2 INJECTION, SOLUTION INTRAMUSCULAR; INTRAVENOUS at 16:02

## 2022-08-09 RX ADMIN — ROPINIROLE HYDROCHLORIDE 0.5 MG: 0.5 TABLET, FILM COATED ORAL at 20:51

## 2022-08-09 RX ADMIN — DIPHENOXYLATE HYDROCHLORIDE AND ATROPINE SULFATE 1 TABLET: 2.5; .025 TABLET ORAL at 21:01

## 2022-08-09 RX ADMIN — MORPHINE SULFATE 2 MG: 2 INJECTION, SOLUTION INTRAMUSCULAR; INTRAVENOUS at 20:30

## 2022-08-09 RX ADMIN — DIPHENOXYLATE HYDROCHLORIDE AND ATROPINE SULFATE 1 TABLET: 2.5; .025 TABLET ORAL at 09:36

## 2022-08-09 RX ADMIN — SODIUM CHLORIDE: 9 INJECTION, SOLUTION INTRAVENOUS at 04:18

## 2022-08-09 RX ADMIN — NYSTATIN 500000 UNITS: 100000 SUSPENSION ORAL at 09:37

## 2022-08-09 ASSESSMENT — PAIN DESCRIPTION - LOCATION: LOCATION: ABDOMEN

## 2022-08-09 ASSESSMENT — PAIN SCALES - GENERAL
PAINLEVEL_OUTOF10: 7
PAINLEVEL_OUTOF10: 0
PAINLEVEL_OUTOF10: 8
PAINLEVEL_OUTOF10: 0
PAINLEVEL_OUTOF10: 8
PAINLEVEL_OUTOF10: 0

## 2022-08-09 ASSESSMENT — PAIN DESCRIPTION - DESCRIPTORS: DESCRIPTORS: ACHING;STABBING

## 2022-08-09 ASSESSMENT — PAIN DESCRIPTION - ORIENTATION: ORIENTATION: UPPER

## 2022-08-09 NOTE — CONSULTS
Nephrology consult    Admission Date:  8/8/2022    Admission Diagnosis  Dehydration [E86.0]  Hypokalemia [E87.6]  JULIETH (acute kidney injury) ([facility] [N17.9]  Diarrhea, unspecified type [R19.7]  Acute renal failure superimposed on chronic kidney disease, unspecified CKD stage, unspecified acute renal failure type ([address] [N17.9, N18.9]    We are asked by Taj Silvestre MD    History of Present Illness: Ms. Devante Rubi is a 65 y.o female with PMH significant for OA, GERD, HTN, DM, CKD 3b, Ulcerative colitis/crohn's reportedly admitted with complaints of nausea, vomiting, diarrhea and abdominal pain. On solumedrol, PPI, levsin and lomotil per GI. We are consulted for JULIETH/CKD 3b. From a renal standpoint her Cr/BUN on admission was 3.80/52-> 3.60/52->3.0/49 responding to IVFs. K 2.7, Sna 130. baseline Cr 1.6-1.9. UA trace ketones, + 30 protein, trace LE. No recent contrast exposure, home meds significant for ARB, HCTZ. Pt seen and examined in room sitting up on edge of the bed, reports N/V/D, and abdominal pain for weeks prior to admission with poor intake. Has had decrease in uop, follows by Dr. Aranza Robb year ago. Denies CP, fever/chills, dizziness, syncope, edema or NSAID use, no dysuria, urinary hesitancy or urgency. Past Medical History:   Diagnosis Date    Depression     managed with medication     Disc prolapse     L4 to L5    DJD (degenerative joint disease)     GERD (gastroesophageal reflux disease)     managed with medication     Hypertension     managed with medication     Obesity (BMI 30-39. 9)     BMI 31.5    Osteoarthritis     Steroid-induced diabetes mellitus ([facility] [facility] 6/5/2013    Steroid-induced diabetes mellitus ([facility] 6/5/2013    no present medication needed     Ulcerative colitis ([facility] [address]     managed with medication       Past Surgical History:   Procedure Laterality Date    COLONOSCOPY  2009    showed UC    FLEXIBLE SIGMOIDOSCOPY N/A 5/11/2016    SIGMOIDOSCOPY FLEXIBLE/   BMI 36 performed by Jonas Knight, MD at [facility] ENDOSCOPY    HEENT  1974    left mastoidectomy    HEENT  1971    tympanoplasty    INNER EAR SURGERY PROC UNLISTED  02,45,75    mastoid cyst +TM repair-hearing loss, left ear    TONSILLECTOMY  1971         VASCULAR SURGERY  06/5/2013 [facility]    port placement    VASCULAR SURGERY  2013    port placement for remicade      Current Facility-Administered Medications   Medication Dose Route Frequency    methylPREDNISolone sodium (SOLU-MEDROL) injection 40 mg  40 mg IntraVENous Q12H    hyoscyamine (LEVSIN/SL) sublingual tablet 125 mcg  125 mcg SubLINGual TID    diphenoxylate-atropine (LOMOTIL) 2.5-0.025 MG per tablet 1 tablet  1 tablet Oral 4x Daily    buPROPion (WELLBUTRIN SR) extended release tablet 150 mg  150 mg Oral QAM    pantoprazole (PROTONIX) tablet 40 mg  40 mg Oral QAM AC    pravastatin (PRAVACHOL) tablet 80 mg  80 mg Oral Nightly    sertraline (ZOLOFT) tablet 100 mg  100 mg Oral Daily    sodium chloride flush 0.9 % injection 5-40 mL  5-40 mL IntraVENous 2 times per day    sodium chloride flush 0.9 % injection 5-40 mL  5-40 mL IntraVENous PRN    0.9 % sodium chloride infusion   IntraVENous PRN    ondansetron (ZOFRAN-ODT) disintegrating tablet 4 mg  4 mg Oral Q8H PRN    Or    ondansetron (ZOFRAN) injection 4 mg  4 mg IntraVENous Q6H PRN    polyethylene glycol (GLYCOLAX) packet 17 g  17 g Oral Daily PRN    acetaminophen (TYLENOL) tablet 650 mg  650 mg Oral Q6H PRN    Or    acetaminophen (TYLENOL) suppository 650 mg  650 mg Rectal Q6H PRN    0.9 % sodium chloride infusion   IntraVENous Continuous    nystatin (MYCOSTATIN) 239515 UNIT/ML suspension 500,000 Units  5 mL Oral 4x Daily    morphine injection 2 mg  2 mg IntraVENous Q4H PRN    rOPINIRole (REQUIP) tablet 0.5 mg  0.5 mg Oral Nightly     Allergies   Allergen Reactions    Codeine Nausea Only      Social History     Tobacco Use    Smoking status: Every Day     Packs/day: 1.00     Types: Cigarettes    Smokeless tobacco: Never   Substance Use Topics Alcohol use: No      Family History   Problem Relation Age of Onset    Osteoarthritis Brother     Heart Disease Father     Hypertension Father     Breast Cancer Neg Hx     Diabetes Mother         type I        Review of Systems  Gen - no fever, no chills, appetite poor  HEENT - no sore throat, no decreased vision, no hearing loss  Neck - no neck mass  CV - no chest pain, no palpitation, no orthopnea  Lung - no shortness of breath, no cough, no hemoptysis  Abd -+ tenderness, + nausea/vomiting/diarrhea, no bloody stool  Ext - no edema, no clubbing, no cyanosis  Musculoskeletal - no joint pain, no back pain  Neurologic - no headaches, no dizziness, no seizures  Psychiatric - no anxiety, no depression  Skin - no rashes, no pupura  Genitourinary - + decreased urine output, no hematuria, no foamy urine    Objective:     Vitals:    08/09/22 0321 08/09/22 0435 08/09/22 0719 08/09/22 1115   BP: 108/67  114/67 106/67   Pulse: 93 (!) 112 98 92   Resp: 16  18 19   Temp: 99.5 °F (37.5 °C)  99.7 °F (37.6 °C) 98.4 °F (36.9 °C)   TempSrc: Oral  Oral Oral   SpO2: 98%  98% 100%   Weight:       Height:           Intake/Output Summary (Last 24 hours) at 8/9/2022 1215  Last data filed at 8/9/2022 0900  Gross per 24 hour   Intake 100 ml   Output 200 ml   Net -100 ml       Physical Exam  GEN :in no distress, alert and oriented  HEENT: anicteric sclerae, Mucous membranes are moist.  Neck - supple without JVD  CV - regular rate , S1, S2, no Rub   Lung - clear bilaterally, lungs expand symmetrically  Abd - soft, + tender, bowel sounds present  Ext - no clubbing, no cyanosis, no edema, right AKA  Neurologic - nonfocal  Genitourinary - bladder nonpalpable  Skin - no rashes, no purpura  Psychiatric: Normal mood and affect.       Data Review:   Recent Labs     08/08/22  1232 08/09/22  0426   WBC 9.4 5.4   HGB 10.0* 8.8*   HCT 28.8* 25.8*    205     Recent Labs     08/08/22  1232 08/08/22  2115 08/09/22  0426   * 126* 130*   K 2.8* 3. 3* 2.7*   CL 92* 95* 96*   CO2 24 24 26   BUN 52* 52* 49*   CREATININE 3.80* 3.60* 3.00*   MG  --  0.7* 0.6*     No results for input(s): PH, PCO2, PO2, PCO2 in the last 72 hours. Problem List:     Patient Active Problem List    Diagnosis Date Noted    JULIETH (acute kidney injury) ([facility] 08/08/2022    Crohn's disease ([facility] 08/08/2022    Hyponatremia 08/08/2022    Hypertension 08/08/2022    Hyperlipidemia 08/08/2022    Depression 08/08/2022    Ulcerative colitis ([facility] 06/05/2013    RLS (restless legs syndrome) 06/05/2013    CKD (chronic kidney disease) stage 3, GFR 30-59 ml/min ([facility] 06/05/2013    Steroid-induced diabetes mellitus ([facility] 06/05/2013    Anemia 06/05/2013    Acute urinary retention 06/05/2013    Essential hypertension, benign 05/07/2013    Hypomagnesemia 12/16/2009    Acute hypokalemia 12/16/2009    Colitis 12/16/2009    Dehydration 12/16/2009    Hypotension 12/16/2009       Impression:    Plan:   JULIETH/CKD 3b likely pre renal azotemia 2/2 GI looses, N/V/D  -baseline Cr 1.6-1.9.   -obtain renal US, urine studies, CK, phos, further work up pending clinical coarse  -trend renal indices with strict I&O    2. Hypokalemia- replete, trend    3. Hyponatremia- improving with NS, likely hypovolemic hypoNa    4. Nausea/vomiting/diarrhea/abdominal pain, hx of ulcerative colitis, : crohn's flare, GI following on On solumedrol, PPI, levsin and lomotil    5.  Anemia- check Fe studies

## 2022-08-09 NOTE — CARE COORDINATION
Pt presented to the ED for c/o several weeks of nausea, emesis and abdominal pain. She was seen for possible Crohn's flare up by GI last week. She was sent for CT chest / AP but is not aware of the results yet. Has a PMHx of Crohn's, HTN, CKD3, depression, HLP and RLS. Pt is C Diff r/o. Pt lives with her spouse in a two-story private residence, lives on the main floor. Indep at baseline. On RA. Uses a cane prn. No current services. PT/OT consulted. OT reported no further skilled therapy was needed at discharge. Await PT recs. PCP confirmed. [employer] verified. Able to afford meds. Will continue to follow. 08/09/22 1142   Service Assessment   Patient Orientation Alert and Oriented   Cognition Alert   History Provided By Patient   Primary Caregiver Self   Support Systems Spouse/Significant Other;Children;/;[facility] [facility];Friends/Neighbors   PCP Verified by CM Yes   Prior Functional Level Independent in ADLs/IADLs   Current Functional Level Independent in ADLs/IADLs   Can patient return to prior living arrangement Yes   Ability to make needs known: Good   Family able to assist with home care needs: Yes   Would you like for me to discuss the discharge plan with any other family members/significant others, and if so, who? No   Financial Resources Medicare   Social/Functional History   Active  Yes   Mode of Transportation Car   Occupation Unemployed   Services At/After Discharge   Transition of [address] ([address] Discharge None   [employer] Information Provided?  No   Mode of Transport at Discharge Other (see comment)   Confirm Follow Up Transport Family

## 2022-08-09 NOTE — PROGRESS NOTES
Patient resting quietly in bed. Respirations even and unlabored. On room air. NS infusing at 125 mL/hr. No signs of distress. No needs expressed. To give report to oncoming RN.

## 2022-08-09 NOTE — PROGRESS NOTES
Hospitalist Progress Note   Admit Date:  2022  3:17 PM   Name:  Bailey Kerr   Age:  65 y.o. Sex:  female  :  1960   MRN:  [id number]   Room:  2/    Presenting Complaint: Nausea     Reason(s) for Admission: Dehydration [E86.0]  Hypokalemia [E87.6]  JULIETH (acute kidney injury) ([facility] [address] [N17.9]  Diarrhea, unspecified type [R19.7]  Acute renal failure superimposed on chronic kidney disease, unspecified CKD stage, unspecified acute renal failure type ([facility] [address] [N17.9, N18.9]     History of Present Illness:       Bailey Kerr is a 65 y.o. female with medical history of crohns disease, HTN, CKD3, depression, HLP, RLS, who is evaluated with complaints os several weeks of nausea, emesis and abdominal pain. She was seen for possible crohn's flare by GI last week. She was sent for CT chest/ AP but is not aware of the results. She did stool studies. Not improving at home with Pedialyte. She has ongoing metallic taste, anorexia, weight loss, dry skin, abdominal pain , decreased urination, diarrhea, nausea. Some dysuria. She has been mostly in the bed due to weakness, feels shaky. Has rectal spasms, burping and hiccups. Denies rectal bleeding. No fever. She is out of her Rudie Saba for 5 days. She is followed by nephrology but not seen by 4-5 years. She thinks her renal function was about 40 %. Prior creatinine <2.0. today 3.80. potassium 2.8, sodium 127. HGB 10.0. FULL CODE     Markus Petties [phone]          Review of Systems:  10 systems reviewed and negative except as noted in HPI. - no vision change, no ear or throat pain, no chest pain no dyspnea or cough     2022  Patient seen and evaluated. New patient for me today.   Cramping is only minimally improved  She does not personally feel like the Karen Cross is working  Afebrile since admission   at bedside  All questions answered      Assessment & Plan:     Principal Problem:    JULIETH (acute kidney injury) ([facility] [address]  CKD (chronic kidney disease) stage 3, GFR 30-59 ml/min (HCC)  8/9/2022  Continue IV fluid hydration  Renal function improving slowly  Continue to monitor electrolytes and correct as needed  Nephrology input appreciated    Active Problems:    Crohn's disease ([facility]  With flare:  GI input is appreciated  They have started Solu-Medrol every 12 hours   had CTs last week  She is out of her xeljanz-does not feel this medication is working for her  C DIFF was negative hydrate           Hyponatremia  8/9/2022  Resolving            Hypertension  8/9/2022  Continue to hold home meds for now  As needed blood pressure meds as needed        Hyperlipidemia  8/9/2022    Resume statin         Depression  8/9/2022     Resume Wellbutrin and zoloft             RLS (restless legs syndrome)  8/9/2022    Continue to hold Requip in the setting of JULIETH        Acute hypokalemia  8/9/2022    K 2.7  Continue to read replace   Place magnesium-it is 0.6          Anemia  8/9/2022    HGB 10.0-->8.8  Follow-up occult stool, iron panel, b12, folate         Thrush:  8/9/2022  Continue nystatin        Dispo/Discharge Planning:   TBD    Diet: ADULT DIET; Regular  VTE ppx: SCD  Code status: Full Code    Hospital Problems:  Principal Problem:    JULIETH (acute kidney injury) ([facility] [address]  Active Problems:    Crohn's disease ([facility]    Hyponatremia    Hypertension    Hyperlipidemia    Depression    RLS (restless legs syndrome)    CKD (chronic kidney disease) stage 3, GFR 30-59 ml/min (HCC)    Acute hypokalemia    Anemia    Essential hypertension, benign  Resolved Problems:    * No resolved hospital problems. *       Past History:     Past Medical History:   Diagnosis Date    Depression     managed with medication     Disc prolapse     L4 to L5    DJD (degenerative joint disease)     GERD (gastroesophageal reflux disease)     managed with medication     Hypertension     managed with medication     Obesity (BMI 30-39. 9)     BMI 31.5    Osteoarthritis     Steroid-induced diabetes mellitus ([facility] [address] 6/5/2013    Steroid[facility]induced diabetes mellitus ([facility] 6/5/2013    no present medication needed     Ulcerative colitis ([facility]     managed with medication        Past Surgical History:   Procedure Laterality Date    COLONOSCOPY  2009    showed UC    FLEXIBLE SIGMOIDOSCOPY N/A 5/11/2016    SIGMOIDOSCOPY FLEXIBLE/   BMI 36 performed by Adali Cowan MD at [place] [address]    left mastoidectomy    HEENT  1971    tympanoplasty    INNER EAR SURGERY PROC UNLISTED  13,95,16    mastoid cyst +TM repair-hearing loss, left ear    TONSILLECTOMY  1971         VASCULAR SURGERY  06/5/2013 [facility]    port placement    VASCULAR SURGERY  2013    port placement for remicade        Social History     Tobacco Use    Smoking status: Every Day     Packs/day: 1.00     Types: Cigarettes    Smokeless tobacco: Never   Substance Use Topics    Alcohol use: No      Social History     Substance and Sexual Activity   Drug Use No       Family History   Problem Relation Age of Onset    Osteoarthritis Brother     Heart Disease Father     Hypertension Father     Breast Cancer Neg Hx     Diabetes Mother         type I            There is no immunization history on file for this patient.   Allergies   Allergen Reactions    Codeine Nausea Only     Prior to Admit Medications:  Current Outpatient Medications   Medication Instructions    acetaminophen (TYLENOL) 650 mg, Oral, EVERY 6 HOURS PRN    buPROPion (WELLBUTRIN XL) 150 mg, Oral, EVERY MORNING    dexlansoprazole (DEXILANT) 60 mg, Oral    irbesartan-hydroCHLOROthiazide (AVALIDE) 150-12.5 MG per tablet 1 tablet, Oral, EVERY OTHER DAY    LORazepam (ATIVAN) 1 mg, Oral    mesalamine (DELZICOL) 400 mg, 2 TIMES DAILY    pravastatin (PRAVACHOL) 80 mg, Oral    predniSONE 10 MG (21) TBPK SEE ADMIN INSTRUCTIONS    Probiotic Product (ACIDOPHILUS PROBIOTIC) CAPS capsule 1 tablet, Oral    rOPINIRole (REQUIP) 0.5 mg, Oral    sertraline (ZOLOFT) 100 mg, Oral    traMADol (ULTRAM) 50 mg, Oral, EVERY 8 HOURS PRN    valsartan-hydroCHLOROthiazide (DIOVAN-HCT) 160-25 MG per tablet 1 tablet, Oral         Objective:   Patient Vitals for the past 24 hrs:   Temp Pulse Resp BP SpO2   08/09/22 1522 98.1 °F (36.7 °C) 88 18 105/70 100 %   08/09/22 1115 98.4 °F (36.9 °C) 92 19 106/67 100 %   08/09/22 0740 -- 90 -- -- --   08/09/22 0719 99.7 °F (37.6 °C) 98 18 114/67 98 %   08/09/22 0435 -- (!) 112 -- -- --   08/09/22 0321 99.5 °F (37.5 °C) 93 16 108/67 98 %   08/09/22 0049 -- 87 -- -- --   08/09/22 0013 98.6 °F (37 °C) 85 16 93/60 98 %   08/08/22 1937 98.2 °F (36.8 °C) 95 16 110/68 --   08/08/22 1730 -- -- -- 110/73 100 %   08/08/22 1630 -- -- -- 111/61 100 %   08/08/22 1621 -- 77 16 107/64 100 %         Oxygen Therapy  SpO2: 100 %  O2 Device: None (Room air)    Estimated body mass index is 21.26 kg/m² as calculated from the following:    Height as of this encounter: 5' 3\" (1.6 m). Weight as of this encounter: 120 lb (54.4 kg). Intake/Output Summary (Last 24 hours) at 8/9/2022 1553  Last data filed at 8/9/2022 1300  Gross per 24 hour   Intake 250 ml   Output 200 ml   Net 50 ml           Physical Exam:    Blood pressure 105/70, pulse 88, temperature 98.1 °F (36.7 °C), temperature source Oral, resp. rate 18, height 5' 3\" (1.6 m), weight 120 lb (54.4 kg), SpO2 100 %. General:    Elderly, no distress, alert , pleasant   Head:  Normocephalic, atraumatic  Eyes:  Sclerae appear normal.  Pupils equally round. ENT:  Nares appear normal, no drainage. Dry  oral mucosa with thrush   Neck:  No restricted ROM. Trachea midline   CV:   RRR. No m/r/g. No jugular venous distension. No edema   Lungs:   CTAB. No wheezing, rhonchi, or rales. Symmetric expansion. Abdomen: Bowel sounds present. Soft, tender RLQ, nondistended. Extremities: No cyanosis or clubbing. No edema  Skin:     No rashes and normal coloration. Warm and  very dry. Neuro:   grossly intact. Psych:  Normal mood and affect.       I have personally reviewed labs and tests showing:  Recent Labs:  Recent Results (from the past 24 hour(s))   Culture, Stool    Collection Time: 08/08/22  6:30 PM    Specimen: Stool   Result Value Ref Range    Special Requests NO SPECIAL REQUESTS      Culture CULTURE IN PROGRESS,FURTHER UPDATES TO FOLLOW     Clostridium Difficile Toxin/Antigen    Collection Time: 08/08/22  6:30 PM    Specimen: Stool   Result Value Ref Range    GDH ANTIGEN C. DIFFICILE GDH ANTIGEN-NEGATIVE      C difficile Toxin, EIA C. DIFFICILE TOXIN-NEGATIVE      Reflex NOT APPLICABLE      C Diff Toxin Interpretation NEGATIVE FOR TOXIGENIC C. DIFFICILE NTXCD      CLINICAL CONSIDERATION NEGATIVE FOR TOXIGENIC C. DIFFICILE     Ferritin    Collection Time: 08/08/22  9:15 PM   Result Value Ref Range    Ferritin 844 (H) 8 - 388 NG/ML   Vitamin B12    Collection Time: 08/08/22  9:15 PM   Result Value Ref Range    Vitamin B-12 3297 (H) 193 - 986 pg/mL   Folate    Collection Time: 08/08/22  9:15 PM   Result Value Ref Range    Folate 37.3 (H) 3.1 - 17.5 ng/mL   Basic Metabolic Panel    Collection Time: 08/08/22  9:15 PM   Result Value Ref Range    Sodium 126 (L) 136 - 145 mmol/L    Potassium 3.3 (L) 3.5 - 5.1 mmol/L    Chloride 95 (L) 98 - 107 mmol/L    CO2 24 21 - 32 mmol/L    Anion Gap 7 7 - 16 mmol/L    Glucose 96 65 - 100 mg/dL    BUN 52 (H) 8 - 23 MG/DL    Creatinine 3.60 (H) 0.6 - 1.0 MG/DL    GFR African American 17 (L) >60 ml/min/1.73m2    GFR Non- 14 (L) >60 ml/min/1.73m2    Calcium 8.3 8.3 - 10.4 MG/DL   Iron    Collection Time: 08/08/22  9:15 PM   Result Value Ref Range    Iron 30 (L) 35 - 150 ug/dL   Magnesium    Collection Time: 08/08/22  9:15 PM   Result Value Ref Range    Magnesium 0.7 (L) 1.8 - 2.4 mg/dL   Urinalysis w rflx microscopic    Collection Time: 08/09/22  1:06 AM   Result Value Ref Range    Color, UA YELLOW/STRAW      Appearance CLOUDY      Specific Muse, UA 1.014 1.001 - 1.023      pH, Urine 5.0 5.0 - 9.0 Protein, UA 30 (A) NEG mg/dL    Glucose, UA Negative mg/dL    Ketones, Urine TRACE (A) NEG mg/dL    Bilirubin Urine Negative NEG      Blood, Urine Negative NEG      Urobilinogen, Urine 0.2 0.2 - 1.0 EU/dL    Nitrite, Urine Negative NEG      Leukocyte Esterase, Urine TRACE (A) NEG      WBC, UA 10-20 0 /hpf    RBC, UA 3-5 0 /hpf    Epithelial Cells UA 10-20 0 /hpf    BACTERIA, URINE 4+ (H) 0 /hpf    OTHER OBSERVATIONS RESULTS VERIFIED MANUALLY     Comprehensive Metabolic Panel w/ Reflex to MG    Collection Time: 08/09/22  4:26 AM   Result Value Ref Range    Sodium 130 (L) 136 - 145 mmol/L    Potassium 2.7 (L) 3.5 - 5.1 mmol/L    Chloride 96 (L) 98 - 107 mmol/L    CO2 26 21 - 32 mmol/L    Anion Gap 8 7 - 16 mmol/L    Glucose 88 65 - 100 mg/dL    BUN 49 (H) 8 - 23 MG/DL    Creatinine 3.00 (H) 0.6 - 1.0 MG/DL    GFR African American 20 (L) >60 ml/min/1.73m2    GFR Non- 17 (L) >60 ml/min/1.73m2    Calcium 7.7 (L) 8.3 - 10.4 MG/DL    Total Bilirubin 0.3 0.2 - 1.1 MG/DL    ALT 12 12 - 65 U/L    AST 10 (L) 15 - 37 U/L    Alk Phosphatase 75 50 - 136 U/L    Total Protein 6.4 6.3 - 8.2 g/dL    Albumin 2.0 (L) 3.2 - 4.6 g/dL    Globulin 4.4 (H) 2.3 - 3.5 g/dL    Albumin/Globulin Ratio 0.5 (L) 1.2 - 3.5     CBC with Auto Differential    Collection Time: 08/09/22  4:26 AM   Result Value Ref Range    WBC 5.4 4.3 - 11.1 K/uL    RBC 2.92 (L) 4.05 - 5.2 M/uL    Hemoglobin 8.8 (L) 11.7 - 15.4 g/dL    Hematocrit 25.8 (L) 35.8 - 46.3 %    MCV 88.4 79.6 - 97.8 FL    MCH 30.1 26.1 - 32.9 PG    MCHC 34.1 31.4 - 35.0 g/dL    RDW 12.8 11.9 - 14.6 %    Platelets 628 521 - 981 K/uL    MPV 8.5 (L) 9.4 - 12.3 FL    nRBC 0.00 0.0 - 0.2 K/uL    Differential Type AUTOMATED      Seg Neutrophils 83 (H) 43 - 78 %    Lymphocytes 7 (L) 13 - 44 %    Monocytes 8 4.0 - 12.0 %    Eosinophils % 0 (L) 0.5 - 7.8 %    Basophils 1 0.0 - 2.0 %    Immature Granulocytes 1 0.0 - 5.0 %    Segs Absolute 4.5 1.7 - 8.2 K/UL    Absolute Lymph # 0.4 (L) 0.5 - 4.6 K/UL    Absolute Mono # 0.4 0.1 - 1.3 K/UL    Absolute Eos # 0.0 0.0 - 0.8 K/UL    Basophils Absolute 0.0 0.0 - 0.2 K/UL    Absolute Immature Granulocyte 0.1 0.0 - 0.5 K/UL   Magnesium    Collection Time: 08/09/22  4:26 AM   Result Value Ref Range    Magnesium 0.6 (L) 1.8 - 2.4 mg/dL       I have personally reviewed imaging studies showing:  No results found. Echocardiogram:  No results found for this or any previous visit. Meds previously ordered:  Orders Placed This Encounter   Medications    lactated ringers bolus    ondansetron (ZOFRAN) injection 4 mg    morphine injection 2 mg    potassium chloride 10 mEq/100 mL IVPB (Peripheral Line)    buPROPion (WELLBUTRIN SR) extended release tablet 150 mg    pantoprazole (PROTONIX) tablet 40 mg    pravastatin (PRAVACHOL) tablet 80 mg    sertraline (ZOLOFT) tablet 100 mg    sodium chloride flush 0.9 % injection 5-40 mL    sodium chloride flush 0.9 % injection 5-40 mL    0.9 % sodium chloride infusion    OR Linked Order Group     ondansetron (ZOFRAN-ODT) disintegrating tablet 4 mg     ondansetron (ZOFRAN) injection 4 mg    polyethylene glycol (GLYCOLAX) packet 17 g    OR Linked Order Group     acetaminophen (TYLENOL) tablet 650 mg     acetaminophen (TYLENOL) suppository 650 mg    0.9 % sodium chloride infusion    nystatin (MYCOSTATIN) 430624 UNIT/ML suspension 500,000 Units    morphine injection 2 mg    rOPINIRole (REQUIP) tablet 0.5 mg    methylPREDNISolone sodium (SOLU-MEDROL) injection 40 mg    hyoscyamine (LEVSIN/SL) sublingual tablet 125 mcg    diphenoxylate-atropine (LOMOTIL) 2.5-0.025 MG per tablet 1 tablet           Signed:  Lowella Epley, MD    Part of this note may have been written by using a voice dictation software. The note has been proof read but may still contain some grammatical/other typographical errors.

## 2022-08-09 NOTE — CONSULTS
[facility] Consult Note       Primary GI Physician: Dr. Deanne Chauhan MD: Dr. Evelyn Ibanez    Referring Provider:  Dr. Zonia Duncan Date:  8/9/2022    Admit Date:  8/8/2022    Chief Complaint:  Crohns    Subjective:     History of Present Illness:  Patient is a 65 y.o. female with PMH including but not limited to crohns disease (lg and sm bowel), HTN, CKD3, depression, HLP, RLS, who is seen in consultation at the request of Dr. Marni Lopez for Crohn's flare. Pt reports a 3-4 week hx of generalized abdominal cramping/pain with constant diarrhea (improved with Lomotil at home), nausea and 40# wt loss in 6 months. Also complains of severe rectal spasms but denies bleeding. Having watery foul smelling stool every 30-60min. Feels dehydrated, weak, and shaky. Her IBD has been essentially uncontrolled with Remicade, Humira, Entyvio, stelara and is currently on [place]. She has been out of [place] for several days (4-5). She has responded intermittently to budesonide and prednisone. Bentyl has been ineffective but she is unsure about Levsin. She was seen by Dr. Franny Rowan in late July with similar complaints. Labs showed normal CBC, LFTs, and TSH. She had elevated Cr at 1.7, elevated CRP and calprotectin 1296 with neg Cdiff 8/2/22. CT A/P with findings cw IBD. On admission, has JULIETH with Cr 3.8, K 2.8, Na 127, HGB 10. EGD 3/23/20 : normal  Colonoscopy 3/23/20: IH, colon polyp, crohn's ileitis and colitis. Cdiff negative 8/2/22. Fecal calprotectin 1296. . TSH normal. LFTs normal.  CT A/P 8/5/22: diffusely thickened transverse, descending, and sigmoid colon  cw with IBD, slightly prominent adrenal glands, small renal and hepatic cysts.       PMH:  Past Medical History:   Diagnosis Date    Depression     managed with medication     Disc prolapse     L4 to L5    DJD (degenerative joint disease)     GERD (gastroesophageal reflux disease)     managed with medication     Hypertension managed with medication     Obesity (BMI 30-39. 9)     BMI 31.5    Osteoarthritis     Steroid-induced diabetes mellitus ([facility] 6/5/2013    Steroid-induced diabetes mellitus ([facility] [address] 6/5/2013    no present medication needed     Ulcerative colitis ([facility] [address]     managed with medication        PSH:  Past Surgical History:   Procedure Laterality Date    COLONOSCOPY  2009    showed UC    FLEXIBLE SIGMOIDOSCOPY N/A 5/11/2016    SIGMOIDOSCOPY FLEXIBLE/   BMI 36 performed by Lucy Mendoza MD at [place] [address]    left mastoidectomy    HEENT  1971    tympanoplasty    INNER EAR SURGERY PROC UNLISTED  85,08,42    mastoid cyst +TM repair-hearing loss, left ear    TONSILLECTOMY  1971         VASCULAR SURGERY  06/5/2013 [place]    port placement    VASCULAR SURGERY  2013    port placement for remicade       Allergies: Allergies   Allergen Reactions    Codeine Nausea Only       Home Medications:  Prior to Admission medications    Medication Sig Start Date End Date Taking? Authorizing Provider   buPROPion (WELLBUTRIN XL) 150 MG extended release tablet Take 150 mg by mouth every morning   Yes Historical Provider, MD   irbesartan-hydroCHLOROthiazide (AVALIDE) 150-12.5 MG per tablet Take 1 tablet by mouth every other day   Yes Historical Provider, MD   traMADol (ULTRAM) 50 MG tablet Take 50 mg by mouth every 8 hours as needed for Pain.    Yes Historical Provider, MD   dexlansoprazole (DEXILANT) 60 MG CPDR delayed release capsule Take 60 mg by mouth   Yes Ar Automatic Reconciliation   pravastatin (PRAVACHOL) 80 MG tablet Take 80 mg by mouth   Yes Ar Automatic Reconciliation   rOPINIRole (REQUIP) 0.5 MG tablet Take 0.5 mg by mouth   Yes Ar Automatic Reconciliation   sertraline (ZOLOFT) 100 MG tablet Take 100 mg by mouth   Yes Ar Automatic Reconciliation   acetaminophen (TYLENOL) 650 MG extended release tablet Take 650 mg by mouth every 6 hours as needed    Ar Automatic Reconciliation   LORazepam (ATIVAN) 1 MG tablet Take 1 mg by mouth.    Ar Automatic Reconciliation   mesalamine (DELZICOL) 400 MG CPDR delayed release capsule Take 400 mg by mouth 2 times daily  Patient not taking: Reported on 8/8/2022    Ar Automatic Reconciliation   predniSONE 10 MG (21) TBPK Take by mouth See Admin Instructions  Patient not taking: Reported on 8/8/2022    Ar Automatic Reconciliation   Probiotic Product (ACIDOPHILUS PROBIOTIC) CAPS capsule Take 1 tablet by mouth    Ar Automatic Reconciliation   valsartan-hydroCHLOROthiazide (DIOVAN-HCT) 160-25 MG per tablet Take 1 tablet by mouth    Ar Automatic Reconciliation       Hospital Medications:  Current Facility-Administered Medications   Medication Dose Route Frequency    buPROPion [facility]) extended release tablet 150 mg  150 mg Oral QAM    pantoprazole (PROTONIX) tablet 40 mg  40 mg Oral QAM AC    pravastatin (PRAVACHOL) tablet 80 mg  80 mg Oral Nightly    sertraline (ZOLOFT) tablet 100 mg  100 mg Oral Daily    sodium chloride flush 0.9 % injection 5-40 mL  5-40 mL IntraVENous 2 times per day    sodium chloride flush 0.9 % injection 5-40 mL  5-40 mL IntraVENous PRN    0.9 % sodium chloride infusion   IntraVENous PRN    ondansetron (ZOFRAN-ODT) disintegrating tablet 4 mg  4 mg Oral Q8H PRN    Or    ondansetron (ZOFRAN) injection 4 mg  4 mg IntraVENous Q6H PRN    polyethylene glycol (GLYCOLAX) packet 17 g  17 g Oral Daily PRN    acetaminophen (TYLENOL) tablet 650 mg  650 mg Oral Q6H PRN    Or    acetaminophen (TYLENOL) suppository 650 mg  650 mg Rectal Q6H PRN    0.9 % sodium chloride infusion   IntraVENous Continuous    nystatin (MYCOSTATIN) 508871 UNIT/ML suspension 500,000 Units  5 mL Oral 4x Daily    morphine injection 2 mg  2 mg IntraVENous Q4H PRN    rOPINIRole (REQUIP) tablet 0.5 mg  0.5 mg Oral Nightly       Social History:  Social History     Tobacco Use    Smoking status: Every Day     Packs/day: 1.00     Types: Cigarettes    Smokeless tobacco: Never   Substance Use Topics    Alcohol use:  No Pt denies any history of drug use, blood transfusions, or tattoos. Family History:  Family History   Problem Relation Age of Onset    Osteoarthritis Brother     Heart Disease Father     Hypertension Father     Breast Cancer Neg Hx     Diabetes Mother         type I       Review of Systems:  A detailed 10 system ROS is obtained, with pertinent positives as listed above. All others are negative. Diet:  CLD    Objective:     Physical Exam:  Vitals:  /67   Pulse 98   Temp 99.7 °F (37.6 °C) (Oral)   Resp 18   Ht 5' 3\" (1.6 m)   Wt 120 lb (54.4 kg)   SpO2 98%   BMI 21.26 kg/m²   Gen:  Pt is alert, cooperative, no acute distress  Skin:  Extremities and face reveal no rashes. HEENT: Sclerae anicteric. Extra-occular muscles are intact. No oral ulcers. No abnormal pigmentation of the lips. The neck is supple. Cardiovascular: Regular rhythm, mild tachycardia. No murmurs, gallops, or rubs. Respiratory:  Comfortable breathing with no accessory muscle use. Clear breath sounds anteriorly with no wheezes, rales, or rhonchi. GI:  Abdomen nondistended, soft, and TTP diffusely. HYPER-active bowel sounds. No enlargement of the liver or spleen. No masses palpable. Rectal:  Deferred  Musculoskeletal:  No pitting edema of the lower legs. Neurological:  Gross memory appears intact. Patient is alert and oriented. Psychiatric:  Mood appears appropriate with judgement intact. Lymphatic:  No cervical or supraclavicular adenopathy.     Laboratory:    Recent Labs     08/08/22  1232 08/08/22  2115 08/09/22  0426   WBC 9.4  --  5.4   HGB 10.0*  --  8.8*   HCT 28.8*  --  25.8*     --  205   MCV 87.5  --  88.4   * 126* 130*   K 2.8* 3.3* 2.7*   CL 92* 95* 96*   CO2 24 24 26   BUN 52* 52* 49*   CREATININE 3.80* 3.60* 3.00*   CALCIUM 8.3 8.3 7.7*   MG  --  0.7* 0.6*   GLUCOSE 96 96 88   ALKPHOS 80  --  75   AST 15  --  10*   ALT 14  --  12   BILITOT 0.3  --  0.3   ALBUMIN 0.4*  --  0.5*   PROT 7.1 --  6.4   LIPASE 43*  --   --        Assessment:     Principal Problem:    JULIETH (acute kidney injury) ([facility] [address]  Active Problems:    Crohn's disease (HCC)    Hyponatremia    Hypertension    Hyperlipidemia    Depression    RLS (restless legs syndrome)    CKD (chronic kidney disease) stage 3, GFR 30-59 ml/min (HCC)    Acute hypokalemia    Anemia    Essential hypertension, benign  Resolved Problems:    * No resolved hospital problems. *    65 y.o. female with PMH including but not limited to crohns disease (lg and sm bowel), HTN, CKD3, depression, HLP, RLS, who is seen in consultation at the request of Dr. Juanito Garcia for Crohn's flare with abdominal cramping, nausea, rectal spasm, non-bloody diarrhea, dehydration, and wt loss. She has failed Remicade, Humira, Entyvio, Stelara. Currenlty on [employer] but not in remission. Recent Cdiff neg, Calprotectin >1200 and CT evidence of bowel thickening cw CD. Has electrolyte imbalance and JULIETH on admission. Plan:     - start Solumedrol IV 40mg q12  - resume regular diet   - continue Protonix daily.  - Add Levsin TID for abdominal cramping  - Add Lomotil (effective at home)  - continue to correct electrolytes. Bakari Mederos      Patient is seen and examined in collaboration with Dr. Nati Zhang. Assessment and plan as per Dr. Joi Stacy.

## 2022-08-09 NOTE — PROGRESS NOTES
Comprehensive Nutrition Assessment    Type and Reason for Visit: Initial, Positive Nutrition Screen  Malnutrition Screening Tool: Malnutrition Screen  Have you recently lost weight without trying?: 14 to 23 pounds (2 points)  Have you been eating poorly because of a decreased appetite?: Yes (1 point)  Malnutrition Screening Tool Score: 3    Nutrition Recommendations/Plan:   Meals and Snacks:  Diet: Continue current order  Nutrition Supplement Therapy:  Medical food supplement therapy:  Initiate Ensure Clear three times per day (this provides 240 kcal and 8 grams protein per bottle)     Malnutrition Assessment:  Malnutrition Status: Moderate malnutrition  Context: Chronic Illness  Findings of clinical characteristics of malnutrition:   Energy Intake:  Mild decrease in energy intake (Comment) (bites at meals x3 weeks)  Weight Loss:  Mild weight loss (specify amount and time period) (20% wt loss since 6/2021)     Body Fat Loss:  Mild body fat loss Triceps, Fat Overlying Ribs   Muscle Mass Loss:  Mild muscle mass loss Clavicles (pectoralis & deltoids), Thigh (quadraceps), Calf (gastrocnemius), Scapula (trapezius), Hand (interosseous)  Fluid Accumulation:  No significant fluid accumulation     Strength:  Not Performed  Nutrition Assessment:  Nutrition History: Pt states she has been having poor po intake x3 weeks PTA consuming bites at meals. She reports taste changes, loss of appetite, and N/V/D. Pt reports ongoing wt loss of the past 2 years weighing 200lbs prior to wt loss. Per EMR, pt weighed 167lbs 6/8/21. Do You Have Any Cultural, Taoist, or Ethnic Food Preferences?: No   Nutrition Background:   Pt with PMH notable for Crohn's disease of large and small bowel, GERD, HTN, OA, and CKD III. Pt presents d/t diarrhea, nausea, abdominal cramping and decreased po intake. Pt admitted with JULIETH and Crohn's flare. Nutrition Interval:  Pt seen lying in bed at time of visit.  She provides nutrition hx as stated above. Discussed addition of ONS. Pt reports dislike of Ensrue or Boost d/t \"chalky\" flavor. She states she has tried Premier protein, but doesn't tolerate well d/t diarrhea. Discussed addition of Ensure Clear with meals. Pt is agreeable and would like to try apple for flavor preference. Pt reports having thrush and using Nystatin. She reports Lomotil has been helpful for diarrhea. Nutrition Related Findings:   NFPE findings stated above      Current Nutrition Therapies:  ADULT DIET; Regular    Current Intake:   Average Meal Intake: 1-25% Average Supplements Intake: None Ordered      Anthropometric Measures:  Height: 5' 3\" (160 cm)  Current Body Wt: 133 lb 13.1 oz (60.7 kg) (8/9), Weight source: Bed Scale  BMI: 23.7, Normal Weight (BMI 18.5-24. 9)  Admission Body Weight: 133 lb 13.1 oz (60.7 kg) (8/9; bed scale)  Ideal Body Weight (Kg) (Calculated): 52 kg (115 lbs), 116.4 %  Usual Body Wt: 167 lb (75.8 kg) (6/8/21; MD office visit), Percent weight change: -19.9       Edema:Peripheral Vascular  Peripheral Vascular (WDL): Within Defined Limits   BMI Category Normal Weight (BMI 18.5-24. 9)  Estimated Daily Nutrient Needs:  Energy (kcal/day): 3466-4118 (Kcal/kg (25-30) Weight used:   Current (60.7kg)  Protein (g/day): 76-91 Weight Used: (Other (Comment) (20% kcal))    Fluid (ml/day):   (1 ml/kcal)    Nutrition Diagnosis:   Inadequate oral intake related to altered GI function, altered taste perception (loss of appetite) as evidenced by  (pt reports barriers to intake)  Moderate malnutrition, In context of chronic illness related to inadequate protein-energy intake as evidenced by Criteria as identified in malnutrition assessment  Nutrition Interventions:   Food and/or Nutrient Delivery: Continue Current Diet, Start Oral Nutrition Supplement     Coordination of Nutrition Care: Continue to monitor while inpatient       Goals:       Active Goal: Meet at least 75% of estimated needs, by next RD assessment       Nutrition Monitoring and Evaluation:      Food/Nutrient Intake Outcomes: Food and Nutrient Intake, Supplement Intake  Physical Signs/Symptoms Outcomes: GI Status, Meal Time Behavior, Weight    Discharge Planning:     Too soon to determine    Rebeca Rincon RD

## 2022-08-09 NOTE — PROGRESS NOTES
PHYSICAL THERAPY Initial Assessment, Discharge, and AM  (Link to Caseload Tracking: PT Visit Days : 1  Acknowledge Orders  Time In/Out  PT Charge Capture  Rehab Caseload Tracker    Gisel Rose is a 65 y.o. female   PRIMARY DIAGNOSIS: JULIETH (acute kidney injury) ([facility]  Dehydration [E86.0]  Hypokalemia [E87.6]  JULIETH (acute kidney injury) ([facility] [N17.9]  Diarrhea, unspecified type [R19.7]  Acute renal failure superimposed on chronic kidney disease, unspecified CKD stage, unspecified acute renal failure type ([facility] [N17.9, N18.9]       Reason for Referral: Difficulty in walking, Not elsewhere classified (R26.2)  Inpatient: Payor: Duffy Kanner / Plan: Katya Quirk / Product Type: *No Product type* /     ASSESSMENT:     REHAB RECOMMENDATIONS:   Recommendation to date pending progress:  Setting:  No further skilled therapy after discharge from hospital    Equipment:    None     ASSESSMENT:  Ms. Kala Meneses is sitting up in bed on arrival, on RA, states feels generally weak from diarrhea but otherwise ok. Pt independent with all mobility in room, independent with toileting needs, pushing IV pole in room for ambulation short distance. Pt slow but steady, no LOB noted with activity. Pt reports general fatigue but otherwise at baseline. PT to discharge from therapy at this time due to no current needs.       [facility] [address] [address] AM-PAC 6 Clicks Basic Mobility Inpatient Short Form  AM-PAC Mobility Inpatient   How much difficulty turning over in bed?: None  How much difficulty sitting down on / standing up from a chair with arms?: None  How much difficulty moving from lying on back to sitting on side of bed?: None  How much help from another person moving to and from a bed to a chair?: None  How much help from another person needed to walk in hospital room?: None  How much help from another person for climbing 3-5 steps with a railing?: A Little  AM-[facility] Inpatient Mobility Raw Score : 23  AM-PAC Inpatient T-Scale Score : 56.93  Mobility Inpatient CMS 0-100% Score: 11.2  Mobility Inpatient CMS G-Code Modifier : CI    SUBJECTIVE:   Ms. Luciano Churchill states, \"I am ok, just tired\"     Social/Functional Lives With: Spouse  Type of Home: House  Home Layout: Multi-level, Able to Live on Main level with bedroom/bathroom  Home Access: Stairs to enter without rails  Entrance Stairs - Number of Steps: 1  Bathroom Shower/Tub: Walk-in shower  Bathroom Toilet: Standard  Bathroom Equipment: Built-in shower seat  Bathroom Accessibility: Accessible  Home Equipment: Cane  Has the patient had two or more falls in the past year or any fall with injury in the past year?: Yes  Receives Help From: Family  ADL Assistance: [address] [place] [address] Avenue: Independent  Ambulation Assistance: Independent  Transfer Assistance: Independent  Active : Yes  Mode of Transportation: Car  Occupation: Unemployed    OBJECTIVE:     PAIN: Selin Abraham / O2: Brayan Heath / Brandi Dyson / Ryan Simon:   Pre Treatment:   Pain Assessment: None - Denies Pain      Post Treatment: 0/10 Vitals        Oxygen  O2 Therapy: Room air   IV    RESTRICTIONS/PRECAUTIONS:                    GROSS EVALUATION: Intact Impaired (Comments):   AROM [x]     PROM [x]    Strength [x]     Balance [x]     Posture [] N/A   Sensation [x]     Coordination [x]      Tone [x]     Edema [x]    Activity Tolerance []  General fatigue    []      COGNITION/  PERCEPTION: Intact Impaired (Comments):   Orientation [x]     Vision [x]     Hearing [x]     Cognition  [x]       MOBILITY: I Mod I S SBA CGA Min Mod Max Total  NT x2 Comments:   Bed Mobility    Rolling [x] [] [] [] [] [] [] [] [] [] []    Supine to Sit [x] [] [] [] [] [] [] [] [] [] []    Scooting [x] [] [] [] [] [] [] [] [] [] []    Sit to Supine [x] [] [] [] [] [] [] [] [] [] []    Transfers    Sit to Stand [x] [] [] [x] [] [] [] [] [] [] []    Bed to Chair [] [] [] [] [] [] [] [] [] [x] []    Stand to Sit [x] [] [] [x] [] [] [] [] [] [] []     [] [] [] [] [] [] [] [] [] [] []    I=Independent, Mod I=Modified Independent, S=Supervision, SBA=Standby Assistance, CGA=Contact Guard Assistance,   Min=Minimal Assistance, Mod=Moderate Assistance, Max=Maximal Assistance, Total=Total Assistance, NT=Not Tested    GAIT: I Mod I S SBA CGA Min Mod Max Total  NT x2 Comments:   Level of Assistance [] [] [] [x] [] [] [] [] [] [] []    Distance 60 feet    DME None    Gait Quality Decreased darby     Weightbearing Status      Stairs      I=Independent, Mod I=Modified Independent, S=Supervision, SBA=Standby Assistance, CGA=Contact Guard Assistance,   Min=Minimal Assistance, Mod=Moderate Assistance, Max=Maximal Assistance, Total=Total Assistance, NT=Not Tested    PLAN:   ACUTE PHYSICAL THERAPY GOALS:   (Developed with and agreed upon by patient and/or caregiver. )  LTG:  (1.)Ms. Sincere Barrios will move from supine to sit and sit to supine , scoot up and down, and roll side to side in bed with INDEPENDENT within 1 treatment day(s). GOAL MET 8/9/2022  (2.)Ms. Sincere Barrios will transfer from bed to chair and chair to bed with INDEPENDENT using the least restrictive device within 1 treatment day(s). GOAL MET 8/9/2022  (3.)Ms. Andres will ambulate with INDEPENDENCE for 60 feet with the least restrictive device within 1 treatment day(s). ________________________________________________________________________________________________       FREQUENCY AND DURATION:  (eval and d/c) for duration of hospital stay or until stated goals are met, whichever comes first.    THERAPY PROGNOSIS: Good    PROBLEM LIST:   (Skilled intervention is medically necessary to address:)  Decreased Activity Tolerance INTERVENTIONS PLANNED:   (Benefits and precautions of physical therapy have been discussed with the patient.)  Therapeutic Activity  Gait Training  Education       TREATMENT:   EVALUATION: LOW COMPLEXITY: (Untimed Charge)    TREATMENT:   Therapeutic Activity (23 Minutes):  Therapeutic activity included Supine to Sit, Scooting, Transfer Training, Ambulation on level ground, Sitting balance , and Standing balance to improve functional Activity tolerance, Balance, Coordination, Mobility, and Strength.     TREATMENT GRID:  N/A    AFTER TREATMENT PRECAUTIONS: Bed/Chair Locked, Call light within reach, Needs within reach, and RN notified    INTERDISCIPLINARY COLLABORATION:  RN/ PCT and PT/ PTA    EDUCATION: Education Given To: Patient  Education Provided: Role of Therapy  Education Method: Demonstration  Barriers to Learning: None  Education Outcome: Demonstrated understanding    TIME IN/OUT:  Time In: 0940  Time Out: 1976  Minutes: Zachariah Maddox PT

## 2022-08-09 NOTE — H&P (VIEW-ONLY)
[facility] Consult Note       Primary GI Physician: Dr. Maria G Hilario MD: Dr. Niki Mireles    Referring Provider:  Dr. Leonor Mckeon Date:  8/9/2022    Admit Date:  8/8/2022    Chief Complaint:  Crohns    Subjective:     History of Present Illness:  Patient is a 65 y.o. female with PMH including but not limited to crohns disease (lg and sm bowel), HTN, CKD3, depression, HLP, RLS, who is seen in consultation at the request of Dr. Shasta Mejia for Crohn's flare. Pt reports a 3-4 week hx of generalized abdominal cramping/pain with constant diarrhea (improved with Lomotil at home), nausea and 40# wt loss in 6 months. Also complains of severe rectal spasms but denies bleeding. Having watery foul smelling stool every 30-60min. Feels dehydrated, weak, and shaky. Her IBD has been essentially uncontrolled with Remicade, Humira, Entyvio, stelara and is currently on [place]. She has been out of [place] for several days (4-5). She has responded intermittently to budesonide and prednisone. Bentyl has been ineffective but she is unsure about Levsin. She was seen by Dr. Pedro Tim in late July with similar complaints. Labs showed normal CBC, LFTs, and TSH. She had elevated Cr at 1.7, elevated CRP and calprotectin 1296 with neg Cdiff 8/2/22. CT A/P with findings cw IBD. On admission, has JULIETH with Cr 3.8, K 2.8, Na 127, HGB 10. EGD 3/23/20 : normal  Colonoscopy 3/23/20: IH, colon polyp, crohn's ileitis and colitis. Cdiff negative 8/2/22. Fecal calprotectin 1296. . TSH normal. LFTs normal.  CT A/P 8/5/22: diffusely thickened transverse, descending, and sigmoid colon  cw with IBD, slightly prominent adrenal glands, small renal and hepatic cysts.       PMH:  Past Medical History:   Diagnosis Date    Depression     managed with medication     Disc prolapse     L4 to L5    DJD (degenerative joint disease)     GERD (gastroesophageal reflux disease)     managed with medication     Hypertension managed with medication     Obesity (BMI 30-39. 9)     BMI 31.5    Osteoarthritis     Steroid-induced diabetes mellitus ([facility] 6/5/2013    Steroid-induced diabetes mellitus ([facility] 6/5/2013    no present medication needed     Ulcerative colitis ([facility]     managed with medication        PSH:  Past Surgical History:   Procedure Laterality Date    COLONOSCOPY  2009    showed UC    FLEXIBLE SIGMOIDOSCOPY N/A 5/11/2016    SIGMOIDOSCOPY FLEXIBLE/   BMI 36 performed by Dima Mckoy MD at [place] [address]    left mastoidectomy    HEENT  1971    tympanoplasty    INNER EAR SURGERY PROC UNLISTED  [age]    mastoid cyst +TM repair-hearing loss, left ear    TONSILLECTOMY  1971         VASCULAR SURGERY  06/5/2013 [place]    port placement    VASCULAR SURGERY  2013    port placement for remicade       Allergies: Allergies   Allergen Reactions    Codeine Nausea Only       Home Medications:  Prior to Admission medications    Medication Sig Start Date End Date Taking? Authorizing Provider   buPROPion (WELLBUTRIN XL) 150 MG extended release tablet Take 150 mg by mouth every morning   Yes Historical Provider, MD   irbesartan-hydroCHLOROthiazide (AVALIDE) 150-12.5 MG per tablet Take 1 tablet by mouth every other day   Yes Historical Provider, MD   traMADol (ULTRAM) 50 MG tablet Take 50 mg by mouth every 8 hours as needed for Pain.    Yes Historical Provider, MD   dexlansoprazole (DEXILANT) 60 MG CPDR delayed release capsule Take 60 mg by mouth   Yes Ar Automatic Reconciliation   pravastatin (PRAVACHOL) 80 MG tablet Take 80 mg by mouth   Yes Ar Automatic Reconciliation   rOPINIRole (REQUIP) 0.5 MG tablet Take 0.5 mg by mouth   Yes Ar Automatic Reconciliation   sertraline (ZOLOFT) 100 MG tablet Take 100 mg by mouth   Yes Ar Automatic Reconciliation   acetaminophen (TYLENOL) 650 MG extended release tablet Take 650 mg by mouth every 6 hours as needed    Ar Automatic Reconciliation   LORazepam (ATIVAN) 1 MG tablet Take 1 mg by Pt denies any history of drug use, blood transfusions, or tattoos. Family History:  Family History   Problem Relation Age of Onset    Osteoarthritis Brother     Heart Disease Father     Hypertension Father     Breast Cancer Neg Hx     Diabetes Mother         type I       Review of Systems:  A detailed 10 system ROS is obtained, with pertinent positives as listed above. All others are negative. Diet:  CLD    Objective:     Physical Exam:  Vitals:  /67   Pulse 98   Temp 99.7 °F (37.6 °C) (Oral)   Resp 18   Ht 5' 3\" (1.6 m)   Wt 120 lb (54.4 kg)   SpO2 98%   BMI 21.26 kg/m²   Gen:  Pt is alert, cooperative, no acute distress  Skin:  Extremities and face reveal no rashes. HEENT: Sclerae anicteric. Extra-occular muscles are intact. No oral ulcers. No abnormal pigmentation of the lips. The neck is supple. Cardiovascular: Regular rhythm, mild tachycardia. No murmurs, gallops, or rubs. Respiratory:  Comfortable breathing with no accessory muscle use. Clear breath sounds anteriorly with no wheezes, rales, or rhonchi. GI:  Abdomen nondistended, soft, and TTP diffusely. HYPER-active bowel sounds. No enlargement of the liver or spleen. No masses palpable. Rectal:  Deferred  Musculoskeletal:  No pitting edema of the lower legs. Neurological:  Gross memory appears intact. Patient is alert and oriented. Psychiatric:  Mood appears appropriate with judgement intact. Lymphatic:  No cervical or supraclavicular adenopathy.     Laboratory:    Recent Labs     08/08/22  1232 08/08/22  2115 08/09/22  0426   WBC 9.4  --  5.4   HGB 10.0*  --  8.8*   HCT 28.8*  --  25.8*     --  205   MCV 87.5  --  88.4   * 126* 130*   K 2.8* 3.3* 2.7*   CL 92* 95* 96*   CO2 24 24 26   BUN 52* 52* 49*   CREATININE 3.80* 3.60* 3.00*   CALCIUM 8.3 8.3 7.7*   MG  --  0.7* 0.6*   GLUCOSE 96 96 88   ALKPHOS 80  --  75   AST 15  --  10*   ALT 14  --  12   BILITOT 0.3  --  0.3   ALBUMIN 0.4*  --  0.5*   PROT 7.1 --  6.4   LIPASE 43*  --   --        Assessment:     Principal Problem:    JULIETH (acute kidney injury) ([facility]  Active Problems:    Crohn's disease (HCC)    Hyponatremia    Hypertension    Hyperlipidemia    Depression    RLS (restless legs syndrome)    CKD (chronic kidney disease) stage 3, GFR 30-59 ml/min (HCC)    Acute hypokalemia    Anemia    Essential hypertension, benign  Resolved Problems:    * No resolved hospital problems. *    65 y.o. female with PMH including but not limited to crohns disease (lg and sm bowel), HTN, CKD3, depression, HLP, RLS, who is seen in consultation at the request of Dr. Monie Tenorio for Crohn's flare with abdominal cramping, nausea, rectal spasm, non-bloody diarrhea, dehydration, and wt loss. She has failed Remicade, Humira, Entyvio, Stelara. Currenlty on [place] Hammer but not in remission. Recent Cdiff neg, Calprotectin >1200 and CT evidence of bowel thickening cw CD. Has electrolyte imbalance and JULIETH on admission. Plan:     - start Solumedrol IV 40mg q12  - resume regular diet   - continue Protonix daily.  - Add Levsin TID for abdominal cramping  - Add Lomotil (effective at home)  - continue to correct electrolytes. Bakari Tuttle      Patient is seen and examined in collaboration with Dr. Joyce Montez. Assessment and plan as per Dr. Francia Dodge.

## 2022-08-09 NOTE — PROGRESS NOTES
Patient arrived to room 802 via stretcher. Patient ambulated to stretcher without difficulty. A&O x4. Respirations honorio and unlabored. On room air. Radial and pedal pulses palpable bilaterally. Dual skin assessment completed with Laxmi Lara RN. No signs of pressure injury at this time. No signs of distress. No needs expressed. Bed low and locked. Call light within reach. Will continue to monitor.

## 2022-08-09 NOTE — PROGRESS NOTES
OCCUPATIONAL THERAPY Initial Assessment, Daily Note, Discharge, and AM       OT Visit Days: 1  Acknowledge Orders  Time  OT Charge Capture  Rehab Caseload Tracker      Lauren Bowen is a 65 y.o. female   PRIMARY DIAGNOSIS: JLUIETH (acute kidney injury) ([facility]  Dehydration [E86.0]  Hypokalemia [E87.6]  JULIETH (acute kidney injury) ([facility] [N17.9]  Diarrhea, unspecified type [R19.7]  Acute renal failure superimposed on chronic kidney disease, unspecified CKD stage, unspecified acute renal failure type ([facility] [N17.9, N18.9]       Reason for Referral: Generalized Muscle Weakness (M62.81)  Other lack of cordination (R27.8)  Difficulty in walking, Not elsewhere classified (R26.2)  Repeated Falls (R29.6)  History of falling (Z91.81)  Inpatient: Payor: [address] / Plan: Bakari Hsieh / Product Type: *No Product type* /     ASSESSMENT:     REHAB RECOMMENDATIONS:   Recommendation to date pending progress:  Setting:  No further skilled therapy after discharge from hospital    Equipment:    None     ASSESSMENT:  Ms. Balta Woo is a 66 YO R dominant WF with h/o Chron's here for flare up and not ambulating much lately except to go the bathroom, 23 steps from bed to bathroom. Normally pt lives with her  in a 3 level home with 1 step at entrance from garage, bed and bath on main level. Ind ADLs, ambulation with SPC PRN, not driving, not working, shares IADLs with spouse, admits to 4 falls in last year, once hitting her head. Today, pt found up in bathroom toileting independently. Using IV pole to steady herself. Cleaned and changed pull ups independently. Stood at sink for grooming tasks independently. Pt admits she has B hands tremors at times but does not interfere with her [address] or ADLs. Pt is practicing good energy conservation with ADLs. Reports poor appetite but was drinking the beverages on her breakfast tray this am. Pt is near her baseline and does not need skilled OT at this time. Pt in agreement. DC our services.  [facility] with [address] [facility]licks Daily Activity Inpatient Short Form:    AM-PAC Daily Activity Inpatient   How much help for putting on and taking off regular lower body clothing?: None  How much help for Bathing?: None  How much help for Toileting?: None  How much help for putting on and taking off regular upper body clothing?: None  How much help for taking care of personal grooming?: None  How much help for eating meals?: None  AM-[facility] Inpatient Daily Activity Raw Score: 24  AM-PAC Inpatient ADL T-Scale Score : 57.54  ADL Inpatient CMS 0-100% Score: 0  ADL Inpatient CMS G-Code Modifier : CH    SUBJECTIVE:     Ms. Ariana Velazquez states, \"I Just don't feel good. I have been dealing with this for so long. \"     Social/Functional Lives With: Spouse  Type of Home: House  Home Layout: Multi-level, Able to Live on Main level with bedroom/bathroom  Home Access: Stairs to enter without rails  Entrance Stairs - Number of Steps: 1  Bathroom Shower/Tub: Walk-in shower  Bathroom Toilet: Standard  Bathroom Equipment: Built-in shower seat  Bathroom Accessibility: Accessible  Home Equipment: Cane  Has the patient had two or more falls in the past year or any fall with injury in the past year?: Yes  Receives Help From: Family  ADL Assistance: Independent  Homemaking Assistance: Independent  Ambulation Assistance: Independent  Transfer Assistance: Independent  Active : No  Mode of Transportation: Family  Occupation: Retired    OBJECTIVE:     RODOLFO / Jeannette Keyes / Marilee Carlita: IV and adult pull up    RESTRICTIONS/PRECAUTIONS:       PAIN: [place] / O2:   Pre Treatment:   Pain Assessment: None - Denies Pain  Pain Level: 0  Patient's Stated Pain Goal: 0 - No pain      Post Treatment: no complaint of pain just fatigue       Vitals          Oxygen  room air            GROSS EVALUATION: INTACT IMPAIRED   (See Comments)   UE AROM [x] []   UE PROM [] []   Strength []  Generally weak     Posture / Balance [x]     Sensation [x] Coordination [x]       Tone []       Edema [x]    Activity Tolerance [] Patient Tolerated treatment well, Patient limited by fatigue, Treatment limited secondary to medical complications (free text)     Hand Dominance R [x] L []      COGNITION/  PERCEPTION: INTACT IMPAIRED   (See Comments)   Orientation [x]     Vision [x]  Wears glasses for reading   Hearing [x]     Cognition  [x]     Perception [x]       MOBILITY: I Mod I S SBA CGA Min Mod Max Total  NT x2 Comments:   Bed Mobility    Rolling [x] [] [] [] [] [] [] [] [] [] []    Supine to Sit [x] [] [] [] [] [] [] [] [] [] []    Scooting [x] [] [] [] [] [] [] [] [] [] []    Sit to Supine [x] [] [] [] [] [] [] [] [] [] []    Transfers    Sit to Stand [x] [] [] [] [] [] [] [] [] [] []    Bed to Chair [x] [] [] [] [] [] [] [] [] [] []    Stand to Sit [x] [] [] [] [] [] [] [] [] [] []    Tub/Shower [x] [] [] [] [] [] [] [] [] [] []  Dry transfer in walk in shower   Toilet [x] [] [] [] [] [] [] [] [] [] []      [] [] [] [] [] [] [] [] [] [] []    I=Independent, Mod I=Modified Independent, S=Supervision/Setup, SBA=Standby Assistance, CGA=Contact Guard Assistance, Min=Minimal Assistance, Mod=Moderate Assistance, Max=Maximal Assistance, Total=Total Assistance, NT=Not Tested    ACTIVITIES OF DAILY LIVING: I Mod I S SBA CGA Min Mod Max Total NT Comments   BASIC ADLs:              Upper Body Bathing  [x] [] [] [] [] [] [] [] [] [] Sitting on chair on toilet   Lower Body Bathing [x] [] [] [] [] [] [] [] [] []    Toileting [x] [] [] [] [] [] [] [] [] [] In bathroom   Upper Body Dressing [x] [] [] [] [] [] [] [] [] []    Lower Body Dressing [x] [] [] [] [] [] [] [] [] [] Pull ups and socks   Feeding [x] [] [] [] [] [] [] [] [] []    Grooming [x] [] [] [] [] [] [] [] [] [] In standing at sink   Personal Device Care [] [] [] [] [] [] [] [] [] []    Functional Mobility [x] [] [] [] [] [] [] [] [] [] With no DME   I=Independent, Mod I=Modified Independent, S=Supervision/Setup, SBA=Standby Assistance, CGA=Contact Guard Assistance, Min=Minimal Assistance, Mod=Moderate Assistance, Max=Maximal Assistance, Total=Total Assistance, NT=Not Tested    PLAN:     FREQUENCY/DURATION   OT Plan of Care:  (Buffy and ELOY, near baseline) near baseline, [address]:   (Developed with and agreed upon by patient and/or caregiver.)  1. Patient will complete full body bathing and dressing with independently and adaptive equipment as needed. GOAL MET 8/9/2022  2. Patient will complete toileting with independently. GOAL MET 8/9/2022  3. Patient will tolerate 15 minutes of OT treatment with as needed rest breaks to increase activity tolerance for ADLs with good energy conservation. GOAL MET 8/9/2022  4. Patient will complete functional transfers with independently and adaptive equipment as needed. GOAL MET 8/9/2022    Timeframe: 1 visit        PROBLEM LIST:   (Skilled intervention is medically necessary to address:)  Decreased ADL/Functional Activities  Decreased Activity Tolerance   INTERVENTIONS PLANNED:  (Benefits and precautions of occupational therapy have been discussed with the patient.)  Self Care Training  Therapeutic Activity  Education  edcation completed         TREATMENT:     EVALUATION: LOW COMPLEXITY: (Untimed Charge)    TREATMENT:   Self Care (30 minutes): Patient participated in upper body bathing, lower body bathing, toileting, upper body dressing, lower body dressing, self feeding, and grooming ADLs in supported sitting, unsupported sitting, and standing with minimal verbal cueing to increase activity tolerance and increase safety awareness. Patient also participated in functional mobility, functional transfer, energy conservation, and adaptive equipment training to increase activity tolerance and increase safety awareness.      TREATMENT GRID:  N/A    AFTER TREATMENT PRECAUTIONS: Bed, Bed/Chair Locked, Call light within reach, Needs within reach, and RN notified    INTERDISCIPLINARY COLLABORATION:  MD/ PA/ NP , RN/ PCT, PT/ PTA, OT/ GUTIERREZ, and RN Case Manager/      EDUCATION:  Education Given To: Patient;Staff  Education Provided: Role of Therapy;Plan of Care;Precautions; ADL Adaptive Strategies; Energy Conservation;Transfer Training;Equipment; Fall Prevention Strategies  Education Method: Demonstration;Verbal;Teach Back  Barriers to Learning: None  Education Outcome: Verbalized understanding;Demonstrated understanding    TOTAL TREATMENT DURATION AND TIME:  Time In: 0840  Time Out: 5526  Minutes: 30    KARI DIXON, MS, OTR/L

## 2022-08-10 ENCOUNTER — APPOINTMENT (OUTPATIENT)
Dept: CT IMAGING | Age: 62
DRG: 386 | End: 2022-08-10
Payer: COMMERCIAL

## 2022-08-10 PROBLEM — E44.0 MODERATE PROTEIN-CALORIE MALNUTRITION (HCC): Chronic | Status: ACTIVE | Noted: 2022-08-10

## 2022-08-10 LAB
ANION GAP SERPL CALC-SCNC: 4 MMOL/L (ref 7–16)
APPEARANCE UR: CLEAR
BACTERIA URNS QL MICRO: NEGATIVE /HPF
BILIRUB UR QL: NEGATIVE
BUN SERPL-MCNC: 30 MG/DL (ref 8–23)
CALCIUM SERPL-MCNC: 7.2 MG/DL (ref 8.3–10.4)
CASTS URNS QL MICRO: ABNORMAL /LPF
CHLORIDE SERPL-SCNC: 101 MMOL/L (ref 98–107)
CO2 SERPL-SCNC: 26 MMOL/L (ref 21–32)
COLOR UR: ABNORMAL
CREAT SERPL-MCNC: 1.8 MG/DL (ref 0.6–1)
EPI CELLS #/AREA URNS HPF: ABNORMAL /HPF
ERYTHROCYTE [DISTWIDTH] IN BLOOD BY AUTOMATED COUNT: 12.9 % (ref 11.9–14.6)
GLUCOSE BLD STRIP.AUTO-MCNC: 163 MG/DL (ref 65–100)
GLUCOSE BLD STRIP.AUTO-MCNC: 215 MG/DL (ref 65–100)
GLUCOSE BLD STRIP.AUTO-MCNC: 244 MG/DL (ref 65–100)
GLUCOSE SERPL-MCNC: 251 MG/DL (ref 65–100)
GLUCOSE UR STRIP.AUTO-MCNC: NEGATIVE MG/DL
HCT VFR BLD AUTO: 22.8 % (ref 35.8–46.3)
HGB BLD-MCNC: 7.8 G/DL (ref 11.7–15.4)
HGB UR QL STRIP: ABNORMAL
KETONES UR QL STRIP.AUTO: NEGATIVE MG/DL
LEUKOCYTE ESTERASE UR QL STRIP.AUTO: NEGATIVE
MAGNESIUM SERPL-MCNC: 2 MG/DL (ref 1.8–2.4)
MCH RBC QN AUTO: 30.5 PG (ref 26.1–32.9)
MCHC RBC AUTO-ENTMCNC: 34.2 G/DL (ref 31.4–35)
MCV RBC AUTO: 89.1 FL (ref 79.6–97.8)
MUCOUS THREADS URNS QL MICRO: 0 /LPF
NITRITE UR QL STRIP.AUTO: NEGATIVE
NRBC # BLD: 0 K/UL (ref 0–0.2)
OSMOLALITY UR: 279 MOSM/KG H2O (ref 50–1400)
PH UR STRIP: 5 [PH] (ref 5–9)
PLATELET # BLD AUTO: 174 K/UL (ref 150–450)
PMV BLD AUTO: 8.3 FL (ref 9.4–12.3)
POTASSIUM SERPL-SCNC: 3.1 MMOL/L (ref 3.5–5.1)
PROT UR STRIP-MCNC: 30 MG/DL
RBC # BLD AUTO: 2.56 M/UL (ref 4.05–5.2)
RBC #/AREA URNS HPF: ABNORMAL /HPF
SERVICE CMNT-IMP: ABNORMAL
SODIUM SERPL-SCNC: 131 MMOL/L (ref 136–145)
SP GR UR REFRACTOMETRY: 1.01 (ref 1–1.02)
URINE CULTURE IF INDICATED: ABNORMAL
UROBILINOGEN UR QL STRIP.AUTO: 0.2 EU/DL (ref 0.2–1)
WBC # BLD AUTO: 6 K/UL (ref 4.3–11.1)
WBC URNS QL MICRO: ABNORMAL /HPF

## 2022-08-10 PROCEDURE — 6370000000 HC RX 637 (ALT 250 FOR IP): Performed by: INTERNAL MEDICINE

## 2022-08-10 PROCEDURE — 2580000003 HC RX 258: Performed by: INTERNAL MEDICINE

## 2022-08-10 PROCEDURE — 82962 GLUCOSE BLOOD TEST: CPT

## 2022-08-10 PROCEDURE — 36415 COLL VENOUS BLD VENIPUNCTURE: CPT

## 2022-08-10 PROCEDURE — 1100000000 HC RM PRIVATE

## 2022-08-10 PROCEDURE — 1100000003 HC PRIVATE W/ TELEMETRY

## 2022-08-10 PROCEDURE — 6360000002 HC RX W HCPCS: Performed by: INTERNAL MEDICINE

## 2022-08-10 PROCEDURE — 6370000000 HC RX 637 (ALT 250 FOR IP): Performed by: PHYSICIAN ASSISTANT

## 2022-08-10 PROCEDURE — 6370000000 HC RX 637 (ALT 250 FOR IP): Performed by: HOSPITALIST

## 2022-08-10 PROCEDURE — 85027 COMPLETE CBC AUTOMATED: CPT

## 2022-08-10 PROCEDURE — 81001 URINALYSIS AUTO W/SCOPE: CPT

## 2022-08-10 PROCEDURE — 2580000003 HC RX 258: Performed by: NURSE PRACTITIONER

## 2022-08-10 PROCEDURE — 74176 CT ABD & PELVIS W/O CONTRAST: CPT

## 2022-08-10 PROCEDURE — 6360000002 HC RX W HCPCS: Performed by: NURSE PRACTITIONER

## 2022-08-10 PROCEDURE — 83735 ASSAY OF MAGNESIUM: CPT

## 2022-08-10 PROCEDURE — 80048 BASIC METABOLIC PNL TOTAL CA: CPT

## 2022-08-10 RX ORDER — POTASSIUM CHLORIDE 20 MEQ/1
20 TABLET, EXTENDED RELEASE ORAL 2 TIMES DAILY WITH MEALS
Status: DISCONTINUED | OUTPATIENT
Start: 2022-08-10 | End: 2022-08-15

## 2022-08-10 RX ORDER — INSULIN LISPRO 100 [IU]/ML
0-4 INJECTION, SOLUTION INTRAVENOUS; SUBCUTANEOUS
Status: DISCONTINUED | OUTPATIENT
Start: 2022-08-10 | End: 2022-08-24 | Stop reason: HOSPADM

## 2022-08-10 RX ORDER — INSULIN LISPRO 100 [IU]/ML
0-4 INJECTION, SOLUTION INTRAVENOUS; SUBCUTANEOUS NIGHTLY
Status: DISCONTINUED | OUTPATIENT
Start: 2022-08-10 | End: 2022-08-24 | Stop reason: HOSPADM

## 2022-08-10 RX ORDER — DEXTROSE MONOHYDRATE 100 MG/ML
INJECTION, SOLUTION INTRAVENOUS CONTINUOUS PRN
Status: DISCONTINUED | OUTPATIENT
Start: 2022-08-10 | End: 2022-08-24 | Stop reason: HOSPADM

## 2022-08-10 RX ADMIN — NYSTATIN 500000 UNITS: 100000 SUSPENSION ORAL at 17:30

## 2022-08-10 RX ADMIN — SODIUM CHLORIDE, PRESERVATIVE FREE 10 ML: 5 INJECTION INTRAVENOUS at 08:53

## 2022-08-10 RX ADMIN — MORPHINE SULFATE 2 MG: 2 INJECTION, SOLUTION INTRAMUSCULAR; INTRAVENOUS at 07:32

## 2022-08-10 RX ADMIN — ROPINIROLE HYDROCHLORIDE 0.5 MG: 0.5 TABLET, FILM COATED ORAL at 21:17

## 2022-08-10 RX ADMIN — INSULIN LISPRO 1 UNITS: 100 INJECTION, SOLUTION INTRAVENOUS; SUBCUTANEOUS at 12:02

## 2022-08-10 RX ADMIN — NYSTATIN 500000 UNITS: 100000 SUSPENSION ORAL at 08:47

## 2022-08-10 RX ADMIN — DIPHENOXYLATE HYDROCHLORIDE AND ATROPINE SULFATE 1 TABLET: 2.5; .025 TABLET ORAL at 12:48

## 2022-08-10 RX ADMIN — SODIUM CHLORIDE: 9 INJECTION, SOLUTION INTRAVENOUS at 20:35

## 2022-08-10 RX ADMIN — SODIUM CHLORIDE: 9 INJECTION, SOLUTION INTRAVENOUS at 11:00

## 2022-08-10 RX ADMIN — BUPROPION HYDROCHLORIDE 150 MG: 150 TABLET, EXTENDED RELEASE ORAL at 08:47

## 2022-08-10 RX ADMIN — MORPHINE SULFATE 2 MG: 2 INJECTION, SOLUTION INTRAMUSCULAR; INTRAVENOUS at 17:27

## 2022-08-10 RX ADMIN — MORPHINE SULFATE 2 MG: 2 INJECTION, SOLUTION INTRAMUSCULAR; INTRAVENOUS at 12:49

## 2022-08-10 RX ADMIN — INSULIN LISPRO 1 UNITS: 100 INJECTION, SOLUTION INTRAVENOUS; SUBCUTANEOUS at 17:29

## 2022-08-10 RX ADMIN — POTASSIUM CHLORIDE 20 MEQ: 20 TABLET, EXTENDED RELEASE ORAL at 17:30

## 2022-08-10 RX ADMIN — SERTRALINE HYDROCHLORIDE 100 MG: 25 TABLET ORAL at 08:47

## 2022-08-10 RX ADMIN — MORPHINE SULFATE 2 MG: 2 INJECTION, SOLUTION INTRAMUSCULAR; INTRAVENOUS at 22:07

## 2022-08-10 RX ADMIN — HYOSCYAMINE SULFATE 125 MCG: 0.12 TABLET ORAL at 08:48

## 2022-08-10 RX ADMIN — DIPHENOXYLATE HYDROCHLORIDE AND ATROPINE SULFATE 1 TABLET: 2.5; .025 TABLET ORAL at 08:48

## 2022-08-10 RX ADMIN — METHYLPREDNISOLONE SODIUM SUCCINATE 40 MG: 125 INJECTION, POWDER, FOR SOLUTION INTRAMUSCULAR; INTRAVENOUS at 21:17

## 2022-08-10 RX ADMIN — PRAVASTATIN SODIUM 80 MG: 80 TABLET ORAL at 21:17

## 2022-08-10 RX ADMIN — NYSTATIN 500000 UNITS: 100000 SUSPENSION ORAL at 12:48

## 2022-08-10 RX ADMIN — MORPHINE SULFATE 2 MG: 2 INJECTION, SOLUTION INTRAMUSCULAR; INTRAVENOUS at 03:27

## 2022-08-10 RX ADMIN — DIPHENOXYLATE HYDROCHLORIDE AND ATROPINE SULFATE 1 TABLET: 2.5; .025 TABLET ORAL at 21:17

## 2022-08-10 RX ADMIN — SODIUM CHLORIDE, PRESERVATIVE FREE 10 ML: 5 INJECTION INTRAVENOUS at 21:17

## 2022-08-10 RX ADMIN — POTASSIUM CHLORIDE 10 MEQ: 7.46 INJECTION, SOLUTION INTRAVENOUS at 04:30

## 2022-08-10 RX ADMIN — NYSTATIN 500000 UNITS: 100000 SUSPENSION ORAL at 21:17

## 2022-08-10 RX ADMIN — HYOSCYAMINE SULFATE 125 MCG: 0.12 TABLET ORAL at 21:17

## 2022-08-10 RX ADMIN — METHYLPREDNISOLONE SODIUM SUCCINATE 40 MG: 125 INJECTION, POWDER, FOR SOLUTION INTRAMUSCULAR; INTRAVENOUS at 08:47

## 2022-08-10 RX ADMIN — POTASSIUM CHLORIDE 10 MEQ: 7.46 INJECTION, SOLUTION INTRAVENOUS at 00:28

## 2022-08-10 RX ADMIN — SODIUM CHLORIDE 125 MG: 9 INJECTION, SOLUTION INTRAVENOUS at 11:00

## 2022-08-10 RX ADMIN — DIPHENOXYLATE HYDROCHLORIDE AND ATROPINE SULFATE 1 TABLET: 2.5; .025 TABLET ORAL at 17:30

## 2022-08-10 RX ADMIN — CEFTRIAXONE 1000 MG: 1 INJECTION, POWDER, FOR SOLUTION INTRAMUSCULAR; INTRAVENOUS at 12:48

## 2022-08-10 RX ADMIN — SODIUM CHLORIDE: 9 INJECTION, SOLUTION INTRAVENOUS at 00:28

## 2022-08-10 RX ADMIN — HYOSCYAMINE SULFATE 125 MCG: 0.12 TABLET ORAL at 14:13

## 2022-08-10 RX ADMIN — POTASSIUM CHLORIDE 10 MEQ: 7.46 INJECTION, SOLUTION INTRAVENOUS at 08:51

## 2022-08-10 RX ADMIN — PANTOPRAZOLE SODIUM 40 MG: 40 TABLET, DELAYED RELEASE ORAL at 06:21

## 2022-08-10 RX ADMIN — POTASSIUM CHLORIDE 20 MEQ: 20 TABLET, EXTENDED RELEASE ORAL at 08:48

## 2022-08-10 ASSESSMENT — PAIN DESCRIPTION - ORIENTATION
ORIENTATION: UPPER;LOWER
ORIENTATION: MID

## 2022-08-10 ASSESSMENT — PAIN SCALES - GENERAL
PAINLEVEL_OUTOF10: 3
PAINLEVEL_OUTOF10: 8
PAINLEVEL_OUTOF10: 8
PAINLEVEL_OUTOF10: 7
PAINLEVEL_OUTOF10: 4
PAINLEVEL_OUTOF10: 8
PAINLEVEL_OUTOF10: 7
PAINLEVEL_OUTOF10: 8
PAINLEVEL_OUTOF10: 8
PAINLEVEL_OUTOF10: 4
PAINLEVEL_OUTOF10: 9
PAINLEVEL_OUTOF10: 9
PAINLEVEL_OUTOF10: 0

## 2022-08-10 ASSESSMENT — PAIN DESCRIPTION - DESCRIPTORS
DESCRIPTORS: ACHING;CRAMPING
DESCRIPTORS: ACHING;CRAMPING
DESCRIPTORS: ACHING;CRAMPING;DISCOMFORT
DESCRIPTORS: ACHING;CRAMPING
DESCRIPTORS: ACHING
DESCRIPTORS: ACHING;CRAMPING

## 2022-08-10 ASSESSMENT — PAIN DESCRIPTION - LOCATION
LOCATION: ABDOMEN

## 2022-08-10 NOTE — PROGRESS NOTES
Patient resting in bed on room air. A&Ox4. Respirations even and unlabored. Patient denies pain and is in no acute distress at this time. IVF infusing. No needs expressed. Call light within reach, will continue to monitor.

## 2022-08-10 NOTE — PROGRESS NOTES
Patient resting in bed on room air. A&Ox4. Respirations even and unlabored. Patient denies pain and is in no acute distress at this time. NS running at 125mL/hr. No needs expressed. No acute events overnight. Call light within reach, will continue to monitor. Preparing to give report to oncoming RN.

## 2022-08-10 NOTE — PROGRESS NOTES
Hospitalist Progress Note   Admit Date:  2022  3:17 PM   Name:  Meena Parham   Age:  65 y.o. Sex:  female  :  1960   MRN:  [id number]   Room:  /    Presenting Complaint: Nausea     Reason(s) for Admission: Dehydration [E86.0]  Hypokalemia [E87.6]  JULIETH (acute kidney injury) ([facility] [N17.9]  Diarrhea, unspecified type [R19.7]  Acute renal failure superimposed on chronic kidney disease, unspecified CKD stage, unspecified acute renal failure type ([facility] [N17.9, N18.9]     History of Present Illness:       Meena Parham is a 65 y.o. female with medical history of crohns disease, HTN, CKD3, depression, HLP, RLS, who is evaluated with complaints os several weeks of nausea, emesis and abdominal pain. She was seen for possible crohn's flare by GI last week. She was sent for CT chest/ AP but is not aware of the results. She did stool studies. Not improving at home with Pedialyte. She has ongoing metallic taste, anorexia, weight loss, dry skin, abdominal pain , decreased urination, diarrhea, nausea. Some dysuria. She has been mostly in the bed due to weakness, feels shaky. Has rectal spasms, burping and hiccups. Denies rectal bleeding. No fever. She is out of her [place] [facility] for 5 days. She is followed by nephrology but not seen by 4-5 years. She thinks her renal function was about 40 %. Prior creatinine <2.0. today 3.80. potassium 2.8, sodium 127. HGB 10.0. FULL CODE     Chris Espana [phone]          Review of Systems:  10 systems reviewed and negative except as noted in HPI. - no vision change, no ear or throat pain, no chest pain no dyspnea or cough     8/10/2022  Patient seen and evaluated.   Feeling better with steroids on board   she does not personally feel like the Jessi Kaiser is working  Afebrile since admission  Likely discharge tomorrow per GI if doing okay  All questions answered      Assessment & Plan:     Principal Problem:    JULIETH (acute kidney injury) ([facility]  CKD (chronic kidney disease) stage 3, GFR 30-59 ml/min ([facility]  8/10/2022  Continue IV fluid hydration  Renal function currently stable   continue to monitor electrolytes and correct as needed  Nephrology input appreciated    Active Problems:    Crohn's disease ([facility]  With flare:  GI input is appreciated  Continue Solu-Medrol every 12 hours   had CTs last week  She is out of her xeljanz-does not feel this medication is working for her  C DIFF was negative hydrate           Hyponatremia  8/10/2022  Resolving            Hypertension  8/10/2022  Continue to hold home meds for now  As needed blood pressure meds as needed        Hyperlipidemia  8/10/2022    Resume statin         Depression  8/10/2022     Resume Wellbutrin and zoloft             RLS (restless legs syndrome)  8/10/2022    Continue to hold Requip in the setting of JULIETH        Acute hypokalemia  8/10/2022    K 3.1  Continue to replace   Magnesium replaced yesterday and its 2.0 today       Anemia  8/10/2022    HGB 10.0-->8.8  Follow-up occult stool, iron panel, b12, folate         Thrush:  8/10/2022  Continue nystatin        Dispo/Discharge Planning:   Likely home in the next 24 hours per GI    Diet: ADULT DIET; Regular  ADULT ORAL NUTRITION SUPPLEMENT; Breakfast, Lunch, Dinner; Clear Liquid Oral Supplement  VTE ppx: SCD  Code status: Full Code    Hospital Problems:  Principal Problem:    JULIETH (acute kidney injury) ([facility]  Active Problems:    Crohn's disease ([facility]    Hyponatremia    Hypertension    Hyperlipidemia    Depression    Moderate protein-calorie malnutrition (HCC)    RLS (restless legs syndrome)    CKD (chronic kidney disease) stage 3, GFR 30-59 ml/min (HCC)    Acute hypokalemia    Anemia    Essential hypertension, benign  Resolved Problems:    * No resolved hospital problems.  *       Past History:     Past Medical History:   Diagnosis Date    Depression     managed with medication     Disc prolapse     L4 to L5    DJD (degenerative joint disease) GERD (gastroesophageal reflux disease)     managed with medication     Hypertension     managed with medication     Obesity (BMI 30-39. 9)     BMI 31.5    Osteoarthritis     Steroid-induced diabetes mellitus ([facility] 6/5/2013    Steroid-induced diabetes mellitus ([facility] 6/5/2013    no present medication needed     Ulcerative colitis ([facility]     managed with medication        Past Surgical History:   Procedure Laterality Date    COLONOSCOPY  2009    showed UC    FLEXIBLE SIGMOIDOSCOPY N/A 5/11/2016    SIGMOIDOSCOPY FLEXIBLE/   BMI 36 performed by Stephen Jones MD at [place] [address]    left mastoidectomy    HEENT  1971    tympanoplasty    INNER EAR SURGERY PROC UNLISTED  17,78,74    mastoid cyst +TM repair-hearing loss, left ear    TONSILLECTOMY  1971         VASCULAR SURGERY  06/5/2013 [facility]    port placement    VASCULAR SURGERY  2013    port placement for remicade        Social History     Tobacco Use    Smoking status: Every Day     Packs/day: 1.00     Types: Cigarettes    Smokeless tobacco: Never   Substance Use Topics    Alcohol use: No      Social History     Substance and Sexual Activity   Drug Use No       Family History   Problem Relation Age of Onset    Osteoarthritis Brother     Heart Disease Father     Hypertension Father     Breast Cancer Neg Hx     Diabetes Mother         type I            There is no immunization history on file for this patient.   Allergies   Allergen Reactions    Codeine Nausea Only     Prior to Admit Medications:  Current Outpatient Medications   Medication Instructions    acetaminophen (TYLENOL) 650 mg, Oral, EVERY 6 HOURS PRN    buPROPion (WELLBUTRIN XL) 150 mg, Oral, EVERY MORNING    dexlansoprazole (DEXILANT) 60 mg, Oral    irbesartan-hydroCHLOROthiazide (AVALIDE) 150-12.5 MG per tablet 1 tablet, Oral, EVERY OTHER DAY    LORazepam (ATIVAN) 1 mg, Oral    mesalamine (DELZICOL) 400 mg, 2 TIMES DAILY    pravastatin (PRAVACHOL) 80 mg, Oral    predniSONE 10 MG (21) TBPK SEE ADMIN INSTRUCTIONS    Probiotic Product (ACIDOPHILUS PROBIOTIC) CAPS capsule 1 tablet, Oral    rOPINIRole (REQUIP) 0.5 mg, Oral    sertraline (ZOLOFT) 100 mg, Oral    traMADol (ULTRAM) 50 mg, Oral, EVERY 8 HOURS PRN    valsartan-hydroCHLOROthiazide (DIOVAN-HCT) 160-25 MG per tablet 1 tablet, Oral         Objective:   Patient Vitals for the past 24 hrs:   Temp Pulse Resp BP SpO2   08/10/22 1529 98 °F (36.7 °C) 85 18 118/71 99 %   08/10/22 1319 -- -- 16 -- --   08/10/22 1249 -- -- 16 -- --   08/10/22 1215 97.8 °F (36.6 °C) 88 18 105/68 --   08/10/22 1159 -- 88 -- -- --   08/10/22 1127 97.8 °F (36.6 °C) 76 18 105/68 98 %   08/10/22 0802 -- -- 16 -- --   08/10/22 0745 97.7 °F (36.5 °C) 78 18 104/65 99 %   08/10/22 0732 -- -- 18 -- --   08/10/22 0357 -- -- 18 -- --   08/10/22 0327 -- -- 18 -- --   08/10/22 0234 97.7 °F (36.5 °C) 76 16 101/63 99 %   08/09/22 2239 97.9 °F (36.6 °C) 78 16 (!) 98/57 95 %   08/09/22 2100 -- -- 18 -- --   08/09/22 2030 -- -- 18 -- --   08/09/22 1921 98.6 °F (37 °C) 93 16 90/80 96 %         Oxygen Therapy  SpO2: 99 %  O2 Device: None (Room air)    Estimated body mass index is 23.82 kg/m² as calculated from the following:    Height as of this encounter: 5' 3\" (1.6 m). Weight as of this encounter: 134 lb 7.7 oz (61 kg). Intake/Output Summary (Last 24 hours) at 8/10/2022 1607  Last data filed at 8/10/2022 1332  Gross per 24 hour   Intake 910 ml   Output 500 ml   Net 410 ml           Physical Exam:    Blood pressure 118/71, pulse 85, temperature 98 °F (36.7 °C), temperature source Oral, resp. rate 18, height 5' 3\" (1.6 m), weight 134 lb 7.7 oz (61 kg), SpO2 99 %. General:    Elderly, no distress, alert , pleasant   Head:  Normocephalic, atraumatic  Eyes:  Sclerae appear normal.  Pupils equally round. ENT:  Nares appear normal, no drainage. Dry  oral mucosa with thrush   Neck:  No restricted ROM. Trachea midline   CV:   RRR. No m/r/g. No jugular venous distension.  No edema   Lungs: Result Value Ref Range    Magnesium 2.0 1.8 - 2.4 mg/dL   POCT Glucose    Collection Time: 08/10/22 11:18 AM   Result Value Ref Range    POC Glucose 215 (H) 65 - 100 mg/dL    Performed by: Afia Mena    Urinalysis with Reflex to Culture    Collection Time: 08/10/22 12:40 PM    Specimen: Urine   Result Value Ref Range    Color, UA YELLOW/STRAW      Appearance CLEAR      Specific Gravity, UA 1.013 1.001 - 1.023      pH, Urine 5.0 5.0 - 9.0      Protein, UA 30 (A) NEG mg/dL    Glucose, UA Negative mg/dL    Ketones, Urine Negative NEG mg/dL    Bilirubin Urine Negative NEG      Blood, Urine SMALL (A) NEG      Urobilinogen, Urine 0.2 0.2 - 1.0 EU/dL    Nitrite, Urine Negative NEG      Leukocyte Esterase, Urine Negative NEG      Urine Culture if Indicated CULTURE NOT INDICATED BY UA RESULT      WBC, UA 0-4 (A) NORM /hpf    RBC, UA 0-5 (A) NORM /hpf    BACTERIA, URINE Negative (A) NORM /hpf    Epithelial Cells UA 0-5 (A) NORM /hpf    Casts 2-5 (A) NORM /lpf    Mucus, UA 0 0 /lpf       I have personally reviewed imaging studies showing:  No results found. Echocardiogram:  No results found for this or any previous visit.       Meds previously ordered:  Orders Placed This Encounter   Medications    lactated ringers bolus    ondansetron (ZOFRAN) injection 4 mg    morphine injection 2 mg    potassium chloride 10 mEq/100 mL IVPB (Peripheral Line)    buPROPion (WELLBUTRIN SR) extended release tablet 150 mg    pantoprazole (PROTONIX) tablet 40 mg    pravastatin (PRAVACHOL) tablet 80 mg    sertraline (ZOLOFT) tablet 100 mg    sodium chloride flush 0.9 % injection 5-40 mL    sodium chloride flush 0.9 % injection 5-40 mL    0.9 % sodium chloride infusion    OR Linked Order Group     ondansetron (ZOFRAN-ODT) disintegrating tablet 4 mg     ondansetron (ZOFRAN) injection 4 mg    polyethylene glycol (GLYCOLAX) packet 17 g    OR Linked Order Group     acetaminophen (TYLENOL) tablet 650 mg     acetaminophen (TYLENOL) suppository 650 mg    0.9 % sodium chloride infusion    nystatin (MYCOSTATIN) 557711 UNIT/ML suspension 500,000 Units    morphine injection 2 mg    rOPINIRole (REQUIP) tablet 0.5 mg    methylPREDNISolone sodium (SOLU-MEDROL) injection 40 mg    hyoscyamine (LEVSIN/SL) sublingual tablet 125 mcg    diphenoxylate-atropine (LOMOTIL) 2.5-0.025 MG per tablet 1 tablet    magnesium sulfate 4000 mg in 100 mL IVPB premix    potassium chloride 10 mEq/100 mL IVPB (Peripheral Line)    potassium chloride 10 mEq/100 mL IVPB (Peripheral Line)    potassium chloride (KLOR-CON M) extended release tablet 20 mEq    DISCONTD: iron sucrose (VENOFER) 300 mg in sodium chloride 0.9 % 250 mL IVPB    insulin lispro (HUMALOG) injection vial 0-4 Units    insulin lispro (HUMALOG) injection vial 0-4 Units    glucose chewable tablet 16 g    OR Linked Order Group     dextrose bolus 10% 125 mL     dextrose bolus 10% 250 mL    glucagon (rDNA) injection 1 mg    dextrose 10 % infusion    ferric gluconate (FERRLECIT) 125 mg in sodium chloride 0.9 % 100 mL IVPB    cefTRIAXone (ROCEPHIN) 1,000 mg in sodium chloride 0.9 % 50 mL IVPB mini-bag     Order Specific Question:   Antimicrobial Indications     Answer:   Urinary Tract Infection     Order Specific Question:   UTI duration of therapy     Answer:   3 days           Signed:  Yuli Pepper MD    Part of this note may have been written by using a voice dictation software. The note has been proof read but may still contain some grammatical/other typographical errors.

## 2022-08-10 NOTE — PROGRESS NOTES
8/10/2022    Admit Date: 8/8/2022    Subjective:   CHIEF COMPLAINT- diarrhea  HPI      Overall-much better with steroids and lomotil           Diet-reg    Appetite-good     Nausea-min   Vomiting-no          Pain-no            BM-none last nite   Bleeding-no    Medications-reviewed and adjusted as appropriate   IV FLUIDS-reviewed      FAM HX-per H&P   SH-per H&P   Tob-yes            Etoh-no      Past Medical History:   Diagnosis Date    Depression     managed with medication     Disc prolapse     L4 to L5    DJD (degenerative joint disease)     GERD (gastroesophageal reflux disease)     managed with medication     Hypertension     managed with medication     Obesity (BMI 30-39. 9)     BMI 31.5    Osteoarthritis     Steroid-induced diabetes mellitus ([address] 6/5/2013    Steroid-induced diabetes mellitus ([address] 6/5/2013    no present medication needed     Ulcerative colitis ([facility]     managed with medication                Past Surgical History:   Procedure Laterality Date    COLONOSCOPY  2009    showed UC    FLEXIBLE SIGMOIDOSCOPY N/A 5/11/2016    SIGMOIDOSCOPY FLEXIBLE/   BMI 36 performed by Joao Ortega MD at [place] [address]    left mastoidectomy    HEENT  1971    tympanoplasty    INNER EAR SURGERY PROC UNLISTED  31,74,75    mastoid cyst +TM repair-hearing loss, left ear    TONSILLECTOMY  1971         VASCULAR SURGERY  06/5/2013 [facility]    port placement    VASCULAR SURGERY  2013    port placement for remicade       ROS--                 RESP-neg            CARDIAC-neg                       -neg             Further ROS as per PMH and PSH- see problem list                            Objective:     Visit Vitals  /63   Pulse 76   Temp 97.7 °F (36.5 °C)   Resp 18   Ht 5' 3\" (1.6 m)   Wt 134 lb 7.7 oz (61 kg)   SpO2 99%   BMI 23.82 kg/m²         Intake/Output Summary (Last 24 hours) at 8/10/2022 0716  Last data filed at 8/9/2022 2120  Gross per 24 hour   Intake 350 ml   Output 500 ml   Net -150 ml EXAM:     NEURO-a&o    HEENT-wnl    LUNGS-clear       COR-rrr        ABD-soft , min tenderness, no rebound, good bs        EXT-no edema                         LABS-  Lab Results   Component Value Date/Time    WBC 6.0 08/10/2022 05:04 AM    RBC 2.56 08/10/2022 05:04 AM    HGB 7.8 08/10/2022 05:04 AM    HCT 22.8 08/10/2022 05:04 AM     08/10/2022 05:04 AM     Lab Results   Component Value Date/Time     08/10/2022 05:04 AM    K 3.1 08/10/2022 05:04 AM     08/10/2022 05:04 AM    CO2 26 08/10/2022 05:04 AM    BUN 30 08/10/2022 05:04 AM    GFRAA 37 08/10/2022 05:04 AM    MG 0.6 08/09/2022 04:26 AM    PHOS 2.5 08/09/2022 03:52 PM    GLOB 4.4 08/09/2022 04:26 AM    ALT 12 08/09/2022 04:26 AM          Assessment:     Principal Problem:    JULIETH (acute kidney injury) ([facility]  Active Problems:    Crohn's disease ([facility]    Hyponatremia    Hypertension    Hyperlipidemia    Depression    RLS (restless legs syndrome)    CKD (chronic kidney disease) stage 3, GFR 30-59 ml/min ([facility])    Acute hypokalemia    Anemia    Essential hypertension, benign  Resolved Problems:    * No resolved hospital problems. *    Renal function improved  Sxs better  Mag and k low  Iron low  Plan:     Continue steroids'  Monitor labs- correct mag and k  Iron infusion  If better tomorrow could switch to prednisone and consider discharge       PT SEEN AND EXAMINED AND PLAN DISCUSSED AND IMPLEMENTED.   Marcelino Salgado MD

## 2022-08-10 NOTE — PROGRESS NOTES
Pt is resting in bed. Pt is alert and oriented times 4. Pt has no s/sx of acute distress. Pt is on RA. Pt has no requests at this time. Pt has call light within reach. Will continue to monitor.

## 2022-08-10 NOTE — PROGRESS NOTES
PT was sitting up in bed. [address] introduced self. PT expressed appreciation for Eucharist being served to PT. PT is [employer]. PT expressed gratitude for hospital and its care. PT talked about PT's illness.  offered prayer ending with the [address] Father.  offered additional support if needed. Rev. SWATHI ChengDiv.

## 2022-08-10 NOTE — PROGRESS NOTES
[facility] [place]  Admission Date: 8/8/2022         [address] Nephrology Progress Note: 8/10/2022    Follow-up for: JULIETH/CKD stage 3b    The patient's chart is reviewed and the patient is discussed with the staff. Subjective:     Pt seen and examined in room. Pt feeling better, no complaints.  Pt reports her diarrhea has significantly improved sine starting lomotil    ROS:  No CP, no sob, no edema, no n/v    Current Facility-Administered Medications   Medication Dose Route Frequency    potassium chloride (KLOR-CON M) extended release tablet 20 mEq  20 mEq Oral BID WC    iron sucrose (VENOFER) 300 mg in sodium chloride 0.9 % 250 mL IVPB  300 mg IntraVENous Once    insulin lispro (HUMALOG) injection vial 0-4 Units  0-4 Units SubCUTAneous TID WC    insulin lispro (HUMALOG) injection vial 0-4 Units  0-4 Units SubCUTAneous Nightly    glucose chewable tablet 16 g  4 tablet Oral PRN    dextrose bolus 10% 125 mL  125 mL IntraVENous PRN    Or    dextrose bolus 10% 250 mL  250 mL IntraVENous PRN    glucagon (rDNA) injection 1 mg  1 mg SubCUTAneous PRN    dextrose 10 % infusion   IntraVENous Continuous PRN    methylPREDNISolone sodium (SOLU-MEDROL) injection 40 mg  40 mg IntraVENous Q12H    hyoscyamine (LEVSIN/SL) sublingual tablet 125 mcg  125 mcg SubLINGual TID    diphenoxylate-atropine (LOMOTIL) 2.5-0.025 MG per tablet 1 tablet  1 tablet Oral 4x Daily    buPROPion (WELLBUTRIN SR) extended release tablet 150 mg  150 mg Oral QAM    pantoprazole (PROTONIX) tablet 40 mg  40 mg Oral QAM AC    pravastatin (PRAVACHOL) tablet 80 mg  80 mg Oral Nightly    sertraline (ZOLOFT) tablet 100 mg  100 mg Oral Daily    sodium chloride flush 0.9 % injection 5-40 mL  5-40 mL IntraVENous 2 times per day    sodium chloride flush 0.9 % injection 5-40 mL  5-40 mL IntraVENous PRN    0.9 % sodium chloride infusion   IntraVENous PRN    ondansetron (ZOFRAN-ODT) disintegrating tablet 4 mg  4 mg Oral Q8H PRN    Or    [facility]ansetron [facility]) injection 4 mg  4 mg IntraVENous Q6H PRN    polyethylene glycol (GLYCOLAX) packet 17 g  17 g Oral Daily PRN    acetaminophen (TYLENOL) tablet 650 mg  650 mg Oral Q6H PRN    Or    acetaminophen (TYLENOL) suppository 650 mg  650 mg Rectal Q6H PRN    0.9 % sodium chloride infusion   IntraVENous Continuous    nystatin (MYCOSTATIN) 423719 UNIT/ML suspension 500,000 Units  5 mL Oral 4x Daily    morphine injection 2 mg  2 mg IntraVENous Q4H PRN    rOPINIRole (REQUIP) tablet 0.5 mg  0.5 mg Oral Nightly         Objective:     Vitals:    08/10/22 0327 08/10/22 0357 08/10/22 0732 08/10/22 0745   BP:    104/65   Pulse:    78   Resp: 18 18 18 18   Temp:    97.7 °F (36.5 °C)   TempSrc:    Oral   SpO2:    99%   Weight:       Height:         Intake and Output:   08/08 1901 - 08/10 0700  In: 350 [P.O.:350]  Out: 700 [Urine:700]  08/10 0701 - 08/10 1900  In: 10 [I.V.:10]  Out: -     Physical Exam:   Constitutional:  the patient is well developed and in no acute distress  HEENT:  Sclera clear, pupils equal, oral mucosa moist  Lungs: clear bilaterally  Cardiovascular:  RRR no rub  Abd/GI: soft and non-tender; with positive bowel sounds. Ext: warm without cyanosis. There is no lower leg edema. Musculoskeletal: moves all four extremities with equal strength  Skin:  no jaundice or rashes, no wounds   Neuro: no gross neuro deficits         LAB  Recent Labs     08/08/22  1232 08/09/22  0426 08/10/22  0504   WBC 9.4 5.4 6.0   HGB 10.0* 8.8* 7.8*   HCT 28.8* 25.8* 22.8*    205 174     Recent Labs     08/08/22  2115 08/09/22  0426 08/09/22  1552 08/10/22  0504   * 130*  --  131*   K 3.3* 2.7*  --  3.1*   CL 95* 96*  --  101   CO2 24 26  --  26   BUN 52* 49*  --  30*   CREATININE 3.60* 3.00*  --  1.80*   MG 0.7* 0.6*  --  2.0   PHOS  --   --  2.5  --      No results for input(s): PH, PCO2, PO2, HCO3 in the last 72 hours. Plan:  (Medical Decision Making)   1.  JULIETH/CKD 3b likely pre renal azotemia 2/2 GI looses, N/V/D  -baseline Cr 1.6-1.9.  -Renal ultrasound with mild hydronephrosis - upon review today pt has had some issues with urination at of late. Spoke with Juan M Armstrong NP with urology, will schedule outpatient follow-up  -P/C ratio 0.47  -renal function significantly improved today, appears back to patient's baseline, will continue IVF today due to poor PO intake, decrease rate   -continue to trend renal indices with strict I & O     2. Hypokalemia- repleted per primary team     3. Hyponatremia- improving with NS, likely hypovolemic hypoNa     4. Nausea/vomiting/diarrhea/abdominal pain, hx of ulcerative colitis, Crohn's flare, GI following on On solumedrol, PPI, levsin and lomotil     5.  Anemia- iron deficient, primary team repleting    LAURA Funez - NP  [facility]

## 2022-08-10 NOTE — PROGRESS NOTES
Physician Progress Note      PATIENT:               Josef Ibarra  CSN #:                  [id number]  :                       1960  ADMIT DATE:       2022 3:17 PM  [address] [place] DATE:  RESPONDING  PROVIDER #:        Thuan Delacruz MD          QUERY TEXT:    Patient admitted with JULIETH related to dehydration. If possible, please document   in progress notes and discharge summary if you are evaluating and /or   treating any of the following: The medical record reflects the following:  Risk Factors: Crohns disease, several weeks of nausea/vomiting,  Clinical Indicators: Per dietician, moderate malnutrition. Per H&P, pt with   ongoing anorexia, weight loss, diarrhea, and vomiting. Nephrology notes that   JULIETH, hyponatremia and hypokalemia due volume depletion. Treatment: IV fluids, Nephro/dietician consult,    ASPEN Criteria:    https://aspenjournals. onlinelibrary. brand. com/doi/full/10.1177/425546287847864  5  Options provided:  -- Protein calorie malnutrition mild  -- Protein calorie malnutrition moderate  -- Protein calorie malnutrition severe  -- Other - I will add my own diagnosis  -- Disagree - Not applicable / Not valid  -- Disagree - Clinically unable to determine / Unknown  -- Refer to Clinical Documentation Reviewer    PROVIDER RESPONSE TEXT:    This patient has moderate protein calorie malnutrition. Query created by:  Trenton Thomas on 8/10/2022 9:13 AM      Electronically signed by:  Thuan Delacruz MD 8/10/2022 4:23 PM

## 2022-08-11 LAB
ANION GAP SERPL CALC-SCNC: 4 MMOL/L (ref 7–16)
BUN SERPL-MCNC: 21 MG/DL (ref 8–23)
CALCIUM SERPL-MCNC: 7.3 MG/DL (ref 8.3–10.4)
CHLORIDE SERPL-SCNC: 109 MMOL/L (ref 98–107)
CO2 SERPL-SCNC: 23 MMOL/L (ref 21–32)
CREAT SERPL-MCNC: 1.3 MG/DL (ref 0.6–1)
ERYTHROCYTE [DISTWIDTH] IN BLOOD BY AUTOMATED COUNT: 13.1 % (ref 11.9–14.6)
GLUCOSE BLD STRIP.AUTO-MCNC: 126 MG/DL (ref 65–100)
GLUCOSE BLD STRIP.AUTO-MCNC: 133 MG/DL (ref 65–100)
GLUCOSE BLD STRIP.AUTO-MCNC: 158 MG/DL (ref 65–100)
GLUCOSE BLD STRIP.AUTO-MCNC: 168 MG/DL (ref 65–100)
GLUCOSE SERPL-MCNC: 149 MG/DL (ref 65–100)
HCT VFR BLD AUTO: 23 % (ref 35.8–46.3)
HGB BLD-MCNC: 7.7 G/DL (ref 11.7–15.4)
MCH RBC QN AUTO: 30.3 PG (ref 26.1–32.9)
MCHC RBC AUTO-ENTMCNC: 33.5 G/DL (ref 31.4–35)
MCV RBC AUTO: 90.6 FL (ref 79.6–97.8)
NRBC # BLD: 0 K/UL (ref 0–0.2)
PLATELET # BLD AUTO: 169 K/UL (ref 150–450)
PMV BLD AUTO: 8.1 FL (ref 9.4–12.3)
POTASSIUM SERPL-SCNC: 3.6 MMOL/L (ref 3.5–5.1)
RBC # BLD AUTO: 2.54 M/UL (ref 4.05–5.2)
SERVICE CMNT-IMP: ABNORMAL
SODIUM SERPL-SCNC: 136 MMOL/L (ref 136–145)
WBC # BLD AUTO: 5 K/UL (ref 4.3–11.1)

## 2022-08-11 PROCEDURE — 82962 GLUCOSE BLOOD TEST: CPT

## 2022-08-11 PROCEDURE — 2580000003 HC RX 258: Performed by: INTERNAL MEDICINE

## 2022-08-11 PROCEDURE — 6370000000 HC RX 637 (ALT 250 FOR IP): Performed by: INTERNAL MEDICINE

## 2022-08-11 PROCEDURE — 80048 BASIC METABOLIC PNL TOTAL CA: CPT

## 2022-08-11 PROCEDURE — 36415 COLL VENOUS BLD VENIPUNCTURE: CPT

## 2022-08-11 PROCEDURE — 6370000000 HC RX 637 (ALT 250 FOR IP): Performed by: HOSPITALIST

## 2022-08-11 PROCEDURE — 85027 COMPLETE CBC AUTOMATED: CPT

## 2022-08-11 PROCEDURE — 1100000000 HC RM PRIVATE

## 2022-08-11 PROCEDURE — 6360000002 HC RX W HCPCS: Performed by: INTERNAL MEDICINE

## 2022-08-11 PROCEDURE — 51798 US URINE CAPACITY MEASURE: CPT

## 2022-08-11 PROCEDURE — 6370000000 HC RX 637 (ALT 250 FOR IP): Performed by: PHYSICIAN ASSISTANT

## 2022-08-11 RX ORDER — PREDNISONE 20 MG/1
30 TABLET ORAL 2 TIMES DAILY
Status: DISCONTINUED | OUTPATIENT
Start: 2022-08-11 | End: 2022-08-12

## 2022-08-11 RX ORDER — TRAMADOL HYDROCHLORIDE 50 MG/1
50 TABLET ORAL EVERY 6 HOURS PRN
Status: DISCONTINUED | OUTPATIENT
Start: 2022-08-11 | End: 2022-08-23 | Stop reason: SDUPTHER

## 2022-08-11 RX ORDER — TRAMADOL HYDROCHLORIDE 50 MG/1
100 TABLET ORAL EVERY 6 HOURS PRN
Status: DISCONTINUED | OUTPATIENT
Start: 2022-08-11 | End: 2022-08-23 | Stop reason: SDUPTHER

## 2022-08-11 RX ADMIN — PRAVASTATIN SODIUM 80 MG: 80 TABLET ORAL at 21:59

## 2022-08-11 RX ADMIN — PANTOPRAZOLE SODIUM 40 MG: 40 TABLET, DELAYED RELEASE ORAL at 06:18

## 2022-08-11 RX ADMIN — NYSTATIN 500000 UNITS: 100000 SUSPENSION ORAL at 14:23

## 2022-08-11 RX ADMIN — PREDNISONE 30 MG: 20 TABLET ORAL at 21:59

## 2022-08-11 RX ADMIN — POTASSIUM CHLORIDE 20 MEQ: 20 TABLET, EXTENDED RELEASE ORAL at 17:11

## 2022-08-11 RX ADMIN — ROPINIROLE HYDROCHLORIDE 0.5 MG: 0.5 TABLET, FILM COATED ORAL at 21:59

## 2022-08-11 RX ADMIN — SODIUM CHLORIDE, PRESERVATIVE FREE 10 ML: 5 INJECTION INTRAVENOUS at 22:17

## 2022-08-11 RX ADMIN — SERTRALINE HYDROCHLORIDE 100 MG: 25 TABLET ORAL at 09:03

## 2022-08-11 RX ADMIN — DIPHENOXYLATE HYDROCHLORIDE AND ATROPINE SULFATE 1 TABLET: 2.5; .025 TABLET ORAL at 21:59

## 2022-08-11 RX ADMIN — POTASSIUM CHLORIDE 20 MEQ: 20 TABLET, EXTENDED RELEASE ORAL at 09:02

## 2022-08-11 RX ADMIN — NYSTATIN 500000 UNITS: 100000 SUSPENSION ORAL at 17:10

## 2022-08-11 RX ADMIN — MORPHINE SULFATE 2 MG: 2 INJECTION, SOLUTION INTRAMUSCULAR; INTRAVENOUS at 04:20

## 2022-08-11 RX ADMIN — HYOSCYAMINE SULFATE 125 MCG: 0.12 TABLET ORAL at 09:04

## 2022-08-11 RX ADMIN — SODIUM CHLORIDE: 9 INJECTION, SOLUTION INTRAVENOUS at 22:17

## 2022-08-11 RX ADMIN — TRAMADOL HYDROCHLORIDE 50 MG: 50 TABLET, COATED ORAL at 17:11

## 2022-08-11 RX ADMIN — HYOSCYAMINE SULFATE 125 MCG: 0.12 TABLET ORAL at 14:23

## 2022-08-11 RX ADMIN — HYOSCYAMINE SULFATE 125 MCG: 0.12 TABLET ORAL at 21:59

## 2022-08-11 RX ADMIN — NYSTATIN 500000 UNITS: 100000 SUSPENSION ORAL at 21:59

## 2022-08-11 RX ADMIN — TRAMADOL HYDROCHLORIDE 100 MG: 50 TABLET, COATED ORAL at 11:00

## 2022-08-11 RX ADMIN — DIPHENOXYLATE HYDROCHLORIDE AND ATROPINE SULFATE 1 TABLET: 2.5; .025 TABLET ORAL at 17:11

## 2022-08-11 RX ADMIN — BUPROPION HYDROCHLORIDE 150 MG: 150 TABLET, EXTENDED RELEASE ORAL at 09:03

## 2022-08-11 RX ADMIN — TRAMADOL HYDROCHLORIDE 100 MG: 50 TABLET, COATED ORAL at 23:16

## 2022-08-11 RX ADMIN — ONDANSETRON 4 MG: 4 TABLET, ORALLY DISINTEGRATING ORAL at 11:04

## 2022-08-11 RX ADMIN — NYSTATIN 500000 UNITS: 100000 SUSPENSION ORAL at 09:03

## 2022-08-11 RX ADMIN — SODIUM CHLORIDE, PRESERVATIVE FREE 10 ML: 5 INJECTION INTRAVENOUS at 10:36

## 2022-08-11 RX ADMIN — PREDNISONE 30 MG: 20 TABLET ORAL at 09:02

## 2022-08-11 RX ADMIN — SODIUM CHLORIDE: 9 INJECTION, SOLUTION INTRAVENOUS at 04:56

## 2022-08-11 RX ADMIN — DIPHENOXYLATE HYDROCHLORIDE AND ATROPINE SULFATE 1 TABLET: 2.5; .025 TABLET ORAL at 09:04

## 2022-08-11 ASSESSMENT — PAIN SCALES - GENERAL
PAINLEVEL_OUTOF10: 9
PAINLEVEL_OUTOF10: 7
PAINLEVEL_OUTOF10: 8
PAINLEVEL_OUTOF10: 7
PAINLEVEL_OUTOF10: 8

## 2022-08-11 ASSESSMENT — PAIN DESCRIPTION - LOCATION
LOCATION: ABDOMEN

## 2022-08-11 ASSESSMENT — PAIN DESCRIPTION - DESCRIPTORS
DESCRIPTORS: ACHING;CRAMPING
DESCRIPTORS: ACHING;CRAMPING
DESCRIPTORS: ACHING;DISCOMFORT;CRAMPING
DESCRIPTORS: ACHING;DISCOMFORT;CRAMPING;SHARP
DESCRIPTORS: ACHING;CRAMPING
DESCRIPTORS: ACHING;CRAMPING

## 2022-08-11 ASSESSMENT — PAIN DESCRIPTION - ORIENTATION
ORIENTATION: LOWER
ORIENTATION: MID

## 2022-08-11 NOTE — PROGRESS NOTES
Patient with feelings that she needed to urinate and unable to. Bladder scanned for 297 mL. MD notified and requested In/Out cath and PVR. However, when nurse entered room patient endorsed urination of 300 cc. CNA emptied and recorded. Will continue to monitor.

## 2022-08-11 NOTE — PROGRESS NOTES
8/11/2022    Admit Date: 8/8/2022    Subjective:   CHIEF COMPLAINT- diarrhea  HPI      Overall-much better with steroids and lomotil           Diet-reg    Appetite-good     Nausea-min   Vomiting-no          Pain-mild- takes tramadol at home            BM-minimal   Bleeding-no    Medications-reviewed and adjusted as appropriate   IV FLUIDS-reviewed      FAM HX-per H&P   SH-per H&P   Tob-yes            Etoh-no      Past Medical History:   Diagnosis Date    Depression     managed with medication     Disc prolapse     L4 to L5    DJD (degenerative joint disease)     GERD (gastroesophageal reflux disease)     managed with medication     Hypertension     managed with medication     Obesity (BMI 30-39. 9)     BMI 31.5    Osteoarthritis     Steroid-induced diabetes mellitus ([address] 6/5/2013    Steroid-induced diabetes mellitus ([facility] [address] 6/5/2013    no present medication needed     Ulcerative colitis ([facility] [address]     managed with medication                Past Surgical History:   Procedure Laterality Date    COLONOSCOPY  2009    showed UC    FLEXIBLE SIGMOIDOSCOPY N/A 5/11/2016    SIGMOIDOSCOPY FLEXIBLE/   BMI 36 performed by Kamaljit Lira MD at [facility] 10    left mastoidectomy    HEENT  1971    tympanoplasty    INNER EAR SURGERY PROC UNLISTED  73,72,18    mastoid cyst +TM repair-hearing loss, left ear    TONSILLECTOMY  1971         VASCULAR SURGERY  06/5/2013 [facility]    port placement    VASCULAR SURGERY  2013    port placement for remicade       ROS--                 RESP-neg            CARDIAC-neg                       -neg             Further ROS as per PMH and PSH- see problem list                            Objective:     Visit Vitals  /62   Pulse 80   Temp 97.8 °F (36.6 °C) (Oral)   Resp 18   Ht 5' 3\" (1.6 m)   Wt 134 lb 7.7 oz (61 kg)   SpO2 99%   BMI 23.82 kg/m²         Intake/Output Summary (Last 24 hours) at 8/11/2022 0730  Last data filed at 8/10/2022 1815  Gross per 24 hour   Intake 4750 ml   Output 250 ml   Net 4500 ml         EXAM:     NEURO-a&o    HEENT-wnl    LUNGS-clear       COR-rrr        ABD-soft , min tenderness, no rebound, good bs        EXT-no edema                         LABS-  Lab Results   Component Value Date/Time    WBC 5.0 08/11/2022 04:53 AM    RBC 2.54 08/11/2022 04:53 AM    HGB 7.7 08/11/2022 04:53 AM    HCT 23.0 08/11/2022 04:53 AM     08/11/2022 04:53 AM     Lab Results   Component Value Date/Time     08/11/2022 04:53 AM    K 3.6 08/11/2022 04:53 AM     08/11/2022 04:53 AM    CO2 23 08/11/2022 04:53 AM    BUN 21 08/11/2022 04:53 AM    GFRAA 54 08/11/2022 04:53 AM    MG 2.0 08/10/2022 05:04 AM    PHOS 2.5 08/09/2022 03:52 PM    GLOB 4.4 08/09/2022 04:26 AM    ALT 12 08/09/2022 04:26 AM          Assessment:     Principal Problem:    JULIETH (acute kidney injury) ([facility] [address]  Active Problems:    Crohn's disease ([facility] [address]    Hyponatremia    Hypertension    Hyperlipidemia    Depression    Moderate protein-calorie malnutrition (HCC)    RLS (restless legs syndrome)    CKD (chronic kidney disease) stage 3, GFR 30-59 ml/min (HCC)    Acute hypokalemia    Anemia    Essential hypertension, benign      Renal function improved  Sxs better  Mag and k low  Iron low    8/11/22  Almost ready for discharge  Cont ivf for JULIETH  Switch to prednisone  Tramadol prn       Plan:     po steroids'  Outpt iron infusions        PT SEEN AND EXAMINED AND PLAN DISCUSSED AND IMPLEMENTED.   Robinson Bay MD

## 2022-08-11 NOTE — PROGRESS NOTES
Hospitalist Progress Note   Admit Date:  2022  3:17 PM   Name:  Ernestine Rivas   Age:  65 y.o. Sex:  female  :  1960   MRN:  [id number]   Room:  [facility]/    Presenting Complaint: Nausea     Reason(s) for Admission: Dehydration [E86.0]  Hypokalemia [E87.6]  JULIETH (acute kidney injury) ([facility] [address] [N17.9]  Diarrhea, unspecified type [R19.7]  Acute renal failure superimposed on chronic kidney disease, unspecified CKD stage, unspecified acute renal failure type ([facility] [address] [N17.9, N18.9]     History of Present Illness:       Ernestine Rivas is a 65 y.o. female with medical history of crohns disease, HTN, CKD3, depression, HLP, RLS, who is evaluated with complaints os several weeks of nausea, emesis and abdominal pain. She was seen for possible crohn's flare by GI last week. She was sent for CT chest/ AP but is not aware of the results. She did stool studies. Not improving at home with Pedialyte. She has ongoing metallic taste, anorexia, weight loss, dry skin, abdominal pain , decreased urination, diarrhea, nausea. Some dysuria. She has been mostly in the bed due to weakness, feels shaky. Has rectal spasms, burping and hiccups. Denies rectal bleeding. No fever. She is out of her [facility] for 5 days. She is followed by nephrology but not seen by 4-5 years. She thinks her renal function was about 40 %. Prior creatinine <2.0. today 3.80. potassium 2.8, sodium 127. HGB 10.0. FULL CODE     Ramesh Orr [phone]          Review of Systems:  10 systems reviewed and negative except as noted in HPI. - no vision change, no ear or throat pain, no chest pain no dyspnea or cough     2022  Patient seen and evaluated.   Feeling better with steroids on board   she does not personally feel like the Carolyne Hamm is working  Afebrile since admission  Not yet ready for discharge today per GI   all questions answered      Assessment & Plan:     Principal Problem:    JULIETH (acute kidney injury) ([facility] [address]  CKD (chronic kidney disease) stage 3, GFR 30-59 ml/min (HCC)  8/11/2022  Continue IV fluid hydration  Renal function currently stable and improving with hydration trend  continue to monitor electrolytes and correct as needed  Nephrology input appreciated    Active Problems:    Crohn's disease ([facility] [address]  With flare:  GI input is appreciated  Addition to p.o. prednisone today  Will monitor output   had CTs last week  She is out of her xeljanz-does not feel this medication is working for her  C DIFF was negative hydrate           Hyponatremia  8/11/2022  Resolved            Hypertension  8/11/2022  Continue to hold home meds for now  As needed blood pressure meds as needed        Hyperlipidemia  8/11/2022    Continue statin         Depression  8/11/2022     Continue Wellbutrin and zoloft             RLS (restless legs syndrome)  8/11/2022    Continue to hold Requip in the setting of JULIETH        Acute hypokalemia  8/11/2022    K 3.1  Continue to replace   Magnesium replaced yesterday and its 2.0 today       Anemia  8/11/2022    HGB 10.0-->8.8  Follow-up occult stool, iron panel, b12, folate         Thrush:  8/11/2022  Continue nystatin        Dispo/Discharge Planning:   Likely home in the next 24-36 hours per GI    Diet: ADULT DIET; Regular  ADULT ORAL NUTRITION SUPPLEMENT; Breakfast, Lunch, Dinner; Clear Liquid Oral Supplement  VTE ppx: SCD  Code status: Full Code    Hospital Problems:  Principal Problem:    JULIETH (acute kidney injury) ([facility] [address]  Active Problems:    Crohn's disease ([facility] [address]    Hyponatremia    Hypertension    Hyperlipidemia    Depression    Moderate protein-calorie malnutrition (HCC)    RLS (restless legs syndrome)    CKD (chronic kidney disease) stage 3, GFR 30-59 ml/min (HCC)    Acute hypokalemia    Anemia    Essential hypertension, benign  Resolved Problems:    * No resolved hospital problems.  *       Past History:     Past Medical History:   Diagnosis Date    Depression     managed with medication     Disc prolapse L4 to L5    DJD (degenerative joint disease)     GERD (gastroesophageal reflux disease)     managed with medication     Hypertension     managed with medication     Obesity (BMI 30-39. 9)     BMI 31.5    Osteoarthritis     Steroid-induced diabetes mellitus ([address] 6/5/2013    Steroid-induced diabetes mellitus ([facility] [address] 6/5/2013    no present medication needed     Ulcerative colitis ([address]     managed with medication        Past Surgical History:   Procedure Laterality Date    COLONOSCOPY  2009    showed UC    FLEXIBLE SIGMOIDOSCOPY N/A 5/11/2016    SIGMOIDOSCOPY FLEXIBLE/   BMI 36 performed by Stephen Jones MD at [place] [id number]    left mastoidectomy    HEENT  1971    tympanoplasty    INNER EAR SURGERY PROC UNLISTED  95,61,53    mastoid cyst +TM repair-hearing loss, left ear    TONSILLECTOMY  1971         VASCULAR SURGERY  06/5/2013 [facility]    port placement    VASCULAR SURGERY  2013    port placement for remicade        Social History     Tobacco Use    Smoking status: Every Day     Packs/day: 1.00     Types: Cigarettes    Smokeless tobacco: Never   Substance Use Topics    Alcohol use: No      Social History     Substance and Sexual Activity   Drug Use No       Family History   Problem Relation Age of Onset    Osteoarthritis Brother     Heart Disease Father     Hypertension Father     Breast Cancer Neg Hx     Diabetes Mother         type I            There is no immunization history on file for this patient.   Allergies   Allergen Reactions    Codeine Nausea Only     Prior to Admit Medications:  Current Outpatient Medications   Medication Instructions    acetaminophen (TYLENOL) 650 mg, Oral, EVERY 6 HOURS PRN    buPROPion (WELLBUTRIN XL) 150 mg, Oral, EVERY MORNING    dexlansoprazole (DEXILANT) 60 mg, Oral    irbesartan-hydroCHLOROthiazide (AVALIDE) 150-12.5 MG per tablet 1 tablet, Oral, EVERY OTHER DAY    LORazepam (ATIVAN) 1 mg, Oral    mesalamine (DELZICOL) 400 mg, 2 TIMES DAILY    pravastatin (PRAVACHOL) 80 mg, Oral    predniSONE 10 MG (21) TBPK SEE ADMIN INSTRUCTIONS    Probiotic Product (ACIDOPHILUS PROBIOTIC) CAPS capsule 1 tablet, Oral    rOPINIRole (REQUIP) 0.5 mg, Oral    sertraline (ZOLOFT) 100 mg, Oral    traMADol (ULTRAM) 50 mg, Oral, EVERY 8 HOURS PRN    valsartan-hydroCHLOROthiazide (DIOVAN-HCT) 160-25 MG per tablet 1 tablet, Oral         Objective:   Patient Vitals for the past 24 hrs:   Temp Pulse Resp BP SpO2   08/11/22 1609 98.3 °F (36.8 °C) 100 18 100/70 --   08/11/22 1526 98.3 °F (36.8 °C) 100 18 100/70 97 %   08/11/22 1130 -- -- 18 -- --   08/11/22 1058 98.3 °F (36.8 °C) 85 18 111/67 100 %   08/11/22 0726 97.8 °F (36.6 °C) 80 18 103/62 99 %   08/11/22 0324 97.8 °F (36.6 °C) 88 18 109/62 99 %   08/11/22 0017 97.5 °F (36.4 °C) 82 18 115/68 98 %   08/10/22 1912 98.1 °F (36.7 °C) 80 20 111/66 99 %   08/10/22 1757 -- -- 16 -- --   08/10/22 1727 -- -- 17 -- --   08/10/22 1726 -- 82 -- -- --         Oxygen Therapy  SpO2: 97 %  O2 Device: None (Room air)    Estimated body mass index is 23.82 kg/m² as calculated from the following:    Height as of this encounter: 5' 3\" (1.6 m). Weight as of this encounter: 134 lb 7.7 oz (61 kg). Intake/Output Summary (Last 24 hours) at 8/11/2022 1654  Last data filed at 8/11/2022 1246  Gross per 24 hour   Intake 730 ml   Output --   Net 730 ml           Physical Exam:    Blood pressure 100/70, pulse 100, temperature 98.3 °F (36.8 °C), temperature source Oral, resp. rate 18, height 5' 3\" (1.6 m), weight 134 lb 7.7 oz (61 kg), SpO2 97 %. General:    Elderly, no distress, alert , pleasant   Head:  Normocephalic, atraumatic  Eyes:  Sclerae appear normal.  Pupils equally round. ENT:  Nares appear normal, no drainage. Dry  oral mucosa with thrush   Neck:  No restricted ROM. Trachea midline   CV:   RRR. No m/r/g. No jugular venous distension. No edema   Lungs:   CTAB. No wheezing, rhonchi, or rales. Symmetric expansion. Abdomen: Bowel sounds present. Soft, tender RLQ, nondistended. Extremities: No cyanosis or clubbing. No edema  Skin:     No rashes and normal coloration. Warm and  very dry. Neuro:   grossly intact. Psych:  Normal mood and affect. I have personally reviewed labs and tests showing:  Recent Labs:  Recent Results (from the past 24 hour(s))   POCT Glucose    Collection Time: 08/10/22  8:37 PM   Result Value Ref Range    POC Glucose 163 (H) 65 - 100 mg/dL    Performed by: Sophia    Basic Metabolic Panel    Collection Time: 08/11/22  4:53 AM   Result Value Ref Range    Sodium 136 136 - 145 mmol/L    Potassium 3.6 3.5 - 5.1 mmol/L    Chloride 109 (H) 98 - 107 mmol/L    CO2 23 21 - 32 mmol/L    Anion Gap 4 (L) 7 - 16 mmol/L    Glucose 149 (H) 65 - 100 mg/dL    BUN 21 8 - 23 MG/DL    Creatinine 1.30 (H) 0.6 - 1.0 MG/DL    GFR  54 (L) >60 ml/min/1.73m2    GFR Non- 44 (L) >60 ml/min/1.73m2    Calcium 7.3 (L) 8.3 - 10.4 MG/DL   CBC    Collection Time: 08/11/22  4:53 AM   Result Value Ref Range    WBC 5.0 4.3 - 11.1 K/uL    RBC 2.54 (L) 4.05 - 5.2 M/uL    Hemoglobin 7.7 (L) 11.7 - 15.4 g/dL    Hematocrit 23.0 (L) 35.8 - 46.3 %    MCV 90.6 79.6 - 97.8 FL    MCH 30.3 26.1 - 32.9 PG    MCHC 33.5 31.4 - 35.0 g/dL    RDW 13.1 11.9 - 14.6 %    Platelets 260 718 - 990 K/uL    MPV 8.1 (L) 9.4 - 12.3 FL    nRBC 0.00 0.0 - 0.2 K/uL   POCT Glucose    Collection Time: 08/11/22  7:20 AM   Result Value Ref Range    POC Glucose 158 (H) 65 - 100 mg/dL    Performed by: Humberto    POCT Glucose    Collection Time: 08/11/22 11:20 AM   Result Value Ref Range    POC Glucose 133 (H) 65 - 100 mg/dL    Performed by: Humberto    POCT Glucose    Collection Time: 08/11/22  4:12 PM   Result Value Ref Range    POC Glucose 168 (H) 65 - 100 mg/dL    Performed by: Taylor Jimenez I have personally reviewed imaging studies showing:  No results found.     Echocardiogram:  No results found for this or any previous visit.      [facility] Numbers previously ordered:  Orders Placed This Encounter   Medications    lactated ringers bolus    ondansetron (ZOFRAN) injection 4 mg    morphine injection 2 mg    potassium chloride 10 mEq/100 mL IVPB (Peripheral Line)    buPROPion (WELLBUTRIN SR) extended release tablet 150 mg    pantoprazole (PROTONIX) tablet 40 mg    pravastatin (PRAVACHOL) tablet 80 mg    sertraline (ZOLOFT) tablet 100 mg    sodium chloride flush 0.9 % injection 5-40 mL    sodium chloride flush 0.9 % injection 5-40 mL    0.9 % sodium chloride infusion    OR Linked Order Group     ondansetron (ZOFRAN-ODT) disintegrating tablet 4 mg     ondansetron (ZOFRAN) injection 4 mg    polyethylene glycol (GLYCOLAX) packet 17 g    OR Linked Order Group     acetaminophen (TYLENOL) tablet 650 mg     acetaminophen (TYLENOL) suppository 650 mg    0.9 % sodium chloride infusion    nystatin (MYCOSTATIN) 298099 UNIT/ML suspension 500,000 Units    morphine injection 2 mg    rOPINIRole (REQUIP) tablet 0.5 mg    DISCONTD: methylPREDNISolone sodium (SOLU-MEDROL) injection 40 mg    hyoscyamine (LEVSIN/SL) sublingual tablet 125 mcg    diphenoxylate-atropine (LOMOTIL) 2.5-0.025 MG per tablet 1 tablet    magnesium sulfate 4000 mg in 100 mL IVPB premix    potassium chloride 10 mEq/100 mL IVPB (Peripheral Line)    potassium chloride 10 mEq/100 mL IVPB (Peripheral Line)    potassium chloride (KLOR-CON M) extended release tablet 20 mEq    DISCONTD: iron sucrose (VENOFER) 300 mg in sodium chloride 0.9 % 250 mL IVPB    insulin lispro (HUMALOG) injection vial 0-4 Units    insulin lispro (HUMALOG) injection vial 0-4 Units    glucose chewable tablet 16 g    OR Linked Order Group     dextrose bolus 10% 125 mL     dextrose bolus 10% 250 mL    glucagon (rDNA) injection 1 mg    dextrose 10 % infusion    ferric gluconate (FERRLECIT) 125 mg in sodium chloride 0.9 % 100 mL IVPB    DISCONTD: cefTRIAXone (ROCEPHIN) 1,000 mg in sodium chloride 0.9 % 50 mL IVPB mini-bag     Order Specific Question:   Antimicrobial Indications     Answer:   Urinary Tract Infection     Order Specific Question:   UTI duration of therapy     Answer:   3 days    predniSONE (DELTASONE) tablet 30 mg    OR Linked Order Group     traMADol (ULTRAM) tablet 50 mg     traMADol (ULTRAM) tablet 100 mg           Signed:  Wally Murphy MD    Part of this note may have been written by using a voice dictation software. The note has been proof read but may still contain some grammatical/other typographical errors.

## 2022-08-11 NOTE — PROGRESS NOTES
Patient were discussed in [facility] team meeting this AM.  Patient was admitted for JULIETH. Patient d/c is plan for Saturday 8/13. CM will continue to follow / monitor for any needs or concerns that may occur.

## 2022-08-11 NOTE — PROGRESS NOTES
Hao Alvarado  Admission Date: 8/8/2022         [facility] Progress Note: 8/11/2022    Follow-up for: JULIETH/CKD stage 3b    The patient's chart is reviewed and the patient is discussed with the staff. Subjective:     Pt seen and examined in room. Pt feeling better, no complaints.  Pt reports her diarrhea has significantly improved sine starting lomotil    ROS:  No CP, no sob, no edema, no n/v    Current Facility-Administered Medications   Medication Dose Route Frequency    predniSONE (DELTASONE) tablet 30 mg  30 mg Oral BID    traMADol (ULTRAM) tablet 50 mg  50 mg Oral Q6H PRN    Or    traMADol (ULTRAM) tablet 100 mg  100 mg Oral Q6H PRN    potassium chloride (KLOR-CON M) extended release tablet 20 mEq  20 mEq Oral BID WC    insulin lispro (HUMALOG) injection vial 0-4 Units  0-4 Units SubCUTAneous TID WC    insulin lispro (HUMALOG) injection vial 0-4 Units  0-4 Units SubCUTAneous Nightly    glucose chewable tablet 16 g  4 tablet Oral PRN    dextrose bolus 10% 125 mL  125 mL IntraVENous PRN    Or    dextrose bolus 10% 250 mL  250 mL IntraVENous PRN    glucagon (rDNA) injection 1 mg  1 mg SubCUTAneous PRN    dextrose 10 % infusion   IntraVENous Continuous PRN    hyoscyamine (LEVSIN/SL) sublingual tablet 125 mcg  125 mcg SubLINGual TID    diphenoxylate-atropine (LOMOTIL) 2.5-0.025 MG per tablet 1 tablet  1 tablet Oral 4x Daily    buPROPion (WELLBUTRIN SR) extended release tablet 150 mg  150 mg Oral QAM    pantoprazole (PROTONIX) tablet 40 mg  40 mg Oral QAM AC    pravastatin (PRAVACHOL) tablet 80 mg  80 mg Oral Nightly    sertraline (ZOLOFT) tablet 100 mg  100 mg Oral Daily    sodium chloride flush 0.9 % injection 5-40 mL  5-40 mL IntraVENous 2 times per day    sodium chloride flush 0.9 % injection 5-40 mL  5-40 mL IntraVENous PRN    0.9 % sodium chloride infusion   IntraVENous PRN    ondansetron (ZOFRAN-ODT) disintegrating tablet 4 mg  4 mg Oral Q8H PRN    Or    ondansetron (ZOFRAN) 2.5  --   --        No results for input(s): PH, PCO2, PO2, HCO3 in the last 72 hours. Plan:  (Medical Decision Making)   1. JULIETH/CKD 3b likely pre renal azotemia 2/2 GI looses, N/V/D  -baseline Cr 1.6-1.9.  -Renal ultrasound with mild hydronephrosis - upon review today pt has had some issues with urination at of late. Spoke with Stacey Schroeder NP with urology, will schedule outpatient follow-up  -P/C ratio 0.47  -renal function significantly improved today, appears back to patient's baseline,      2. Hypokalemia- resolved     3. Hyponatremia- improving with NS, l    4. Nausea/vomiting/diarrhea/abdominal pain, hx of ulcerative colitis, Crohn's flare, GI following on On solumedrol, PPI, levsin and lomotil     5.  Anemia- iron deficient, primary team repleting    Leatha Dempsey MD  [facility]

## 2022-08-12 PROBLEM — D50.0 IRON DEFICIENCY ANEMIA DUE TO CHRONIC BLOOD LOSS: Status: ACTIVE | Noted: 2022-08-12

## 2022-08-12 LAB
ANION GAP SERPL CALC-SCNC: 4 MMOL/L (ref 7–16)
BACTERIA SPEC CULT: NORMAL
BACTERIA SPEC CULT: NORMAL
BUN SERPL-MCNC: 15 MG/DL (ref 8–23)
CALCIUM SERPL-MCNC: 7.4 MG/DL (ref 8.3–10.4)
CHLORIDE SERPL-SCNC: 109 MMOL/L (ref 98–107)
CO2 SERPL-SCNC: 22 MMOL/L (ref 21–32)
CREAT SERPL-MCNC: 1.1 MG/DL (ref 0.6–1)
ERYTHROCYTE [DISTWIDTH] IN BLOOD BY AUTOMATED COUNT: 13.2 % (ref 11.9–14.6)
GLUCOSE BLD STRIP.AUTO-MCNC: 100 MG/DL (ref 65–100)
GLUCOSE BLD STRIP.AUTO-MCNC: 111 MG/DL (ref 65–100)
GLUCOSE BLD STRIP.AUTO-MCNC: 134 MG/DL (ref 65–100)
GLUCOSE BLD STRIP.AUTO-MCNC: 154 MG/DL (ref 65–100)
GLUCOSE SERPL-MCNC: 106 MG/DL (ref 65–100)
HCT VFR BLD AUTO: 22.4 % (ref 35.8–46.3)
HGB BLD-MCNC: 7.5 G/DL (ref 11.7–15.4)
MCH RBC QN AUTO: 30.4 PG (ref 26.1–32.9)
MCHC RBC AUTO-ENTMCNC: 33.5 G/DL (ref 31.4–35)
MCV RBC AUTO: 90.7 FL (ref 79.6–97.8)
NRBC # BLD: 0 K/UL (ref 0–0.2)
PLATELET # BLD AUTO: 147 K/UL (ref 150–450)
PMV BLD AUTO: 8 FL (ref 9.4–12.3)
POTASSIUM SERPL-SCNC: 3.8 MMOL/L (ref 3.5–5.1)
RBC # BLD AUTO: 2.47 M/UL (ref 4.05–5.2)
SERVICE CMNT-IMP: ABNORMAL
SERVICE CMNT-IMP: NORMAL
SODIUM SERPL-SCNC: 135 MMOL/L (ref 136–145)
WBC # BLD AUTO: 4.3 K/UL (ref 4.3–11.1)

## 2022-08-12 PROCEDURE — 2580000003 HC RX 258: Performed by: INTERNAL MEDICINE

## 2022-08-12 PROCEDURE — 36415 COLL VENOUS BLD VENIPUNCTURE: CPT

## 2022-08-12 PROCEDURE — 6370000000 HC RX 637 (ALT 250 FOR IP): Performed by: HOSPITALIST

## 2022-08-12 PROCEDURE — 85027 COMPLETE CBC AUTOMATED: CPT

## 2022-08-12 PROCEDURE — 1100000000 HC RM PRIVATE

## 2022-08-12 PROCEDURE — 6370000000 HC RX 637 (ALT 250 FOR IP): Performed by: INTERNAL MEDICINE

## 2022-08-12 PROCEDURE — 82962 GLUCOSE BLOOD TEST: CPT

## 2022-08-12 PROCEDURE — 6360000002 HC RX W HCPCS: Performed by: INTERNAL MEDICINE

## 2022-08-12 PROCEDURE — 6370000000 HC RX 637 (ALT 250 FOR IP): Performed by: PHYSICIAN ASSISTANT

## 2022-08-12 PROCEDURE — 80048 BASIC METABOLIC PNL TOTAL CA: CPT

## 2022-08-12 RX ORDER — METHYLPREDNISOLONE SODIUM SUCCINATE 125 MG/2ML
40 INJECTION, POWDER, LYOPHILIZED, FOR SOLUTION INTRAMUSCULAR; INTRAVENOUS EVERY 12 HOURS
Status: DISCONTINUED | OUTPATIENT
Start: 2022-08-12 | End: 2022-08-16

## 2022-08-12 RX ORDER — FERROUS SULFATE 325(65) MG
325 TABLET ORAL 2 TIMES DAILY WITH MEALS
Status: DISCONTINUED | OUTPATIENT
Start: 2022-08-12 | End: 2022-08-24 | Stop reason: HOSPADM

## 2022-08-12 RX ORDER — CHOLECALCIFEROL (VITAMIN D3) 125 MCG
10 CAPSULE ORAL NIGHTLY PRN
Status: DISCONTINUED | OUTPATIENT
Start: 2022-08-12 | End: 2022-08-12

## 2022-08-12 RX ORDER — LANOLIN ALCOHOL/MO/W.PET/CERES
9 CREAM (GRAM) TOPICAL NIGHTLY
Status: DISCONTINUED | OUTPATIENT
Start: 2022-08-12 | End: 2022-08-24 | Stop reason: HOSPADM

## 2022-08-12 RX ADMIN — HYOSCYAMINE SULFATE 125 MCG: 0.12 TABLET ORAL at 21:24

## 2022-08-12 RX ADMIN — POTASSIUM CHLORIDE 20 MEQ: 20 TABLET, EXTENDED RELEASE ORAL at 17:39

## 2022-08-12 RX ADMIN — Medication 9 MG: at 21:24

## 2022-08-12 RX ADMIN — FERROUS SULFATE TAB 325 MG (65 MG ELEMENTAL FE) 325 MG: 325 (65 FE) TAB at 17:39

## 2022-08-12 RX ADMIN — NYSTATIN 500000 UNITS: 100000 SUSPENSION ORAL at 17:38

## 2022-08-12 RX ADMIN — TRAMADOL HYDROCHLORIDE 100 MG: 50 TABLET, COATED ORAL at 17:18

## 2022-08-12 RX ADMIN — BUPROPION HYDROCHLORIDE 150 MG: 150 TABLET, EXTENDED RELEASE ORAL at 09:08

## 2022-08-12 RX ADMIN — ONDANSETRON 4 MG: 4 TABLET, ORALLY DISINTEGRATING ORAL at 08:59

## 2022-08-12 RX ADMIN — FERROUS SULFATE TAB 325 MG (65 MG ELEMENTAL FE) 325 MG: 325 (65 FE) TAB at 09:04

## 2022-08-12 RX ADMIN — TRAMADOL HYDROCHLORIDE 100 MG: 50 TABLET, COATED ORAL at 09:02

## 2022-08-12 RX ADMIN — HYOSCYAMINE SULFATE 125 MCG: 0.12 TABLET ORAL at 14:53

## 2022-08-12 RX ADMIN — SERTRALINE HYDROCHLORIDE 100 MG: 25 TABLET ORAL at 09:05

## 2022-08-12 RX ADMIN — PRAVASTATIN SODIUM 80 MG: 80 TABLET ORAL at 21:24

## 2022-08-12 RX ADMIN — DIPHENOXYLATE HYDROCHLORIDE AND ATROPINE SULFATE 1 TABLET: 2.5; .025 TABLET ORAL at 14:52

## 2022-08-12 RX ADMIN — DIPHENOXYLATE HYDROCHLORIDE AND ATROPINE SULFATE 1 TABLET: 2.5; .025 TABLET ORAL at 09:04

## 2022-08-12 RX ADMIN — SODIUM CHLORIDE: 9 INJECTION, SOLUTION INTRAVENOUS at 06:15

## 2022-08-12 RX ADMIN — ROPINIROLE HYDROCHLORIDE 0.5 MG: 0.5 TABLET, FILM COATED ORAL at 21:24

## 2022-08-12 RX ADMIN — SODIUM CHLORIDE: 9 INJECTION, SOLUTION INTRAVENOUS at 22:54

## 2022-08-12 RX ADMIN — POTASSIUM CHLORIDE 20 MEQ: 20 TABLET, EXTENDED RELEASE ORAL at 09:08

## 2022-08-12 RX ADMIN — PANTOPRAZOLE SODIUM 40 MG: 40 TABLET, DELAYED RELEASE ORAL at 06:16

## 2022-08-12 RX ADMIN — METHYLPREDNISOLONE SODIUM SUCCINATE 40 MG: 125 INJECTION, POWDER, FOR SOLUTION INTRAMUSCULAR; INTRAVENOUS at 09:09

## 2022-08-12 RX ADMIN — ONDANSETRON 4 MG: 2 INJECTION INTRAMUSCULAR; INTRAVENOUS at 23:12

## 2022-08-12 RX ADMIN — SODIUM CHLORIDE, PRESERVATIVE FREE 10 ML: 5 INJECTION INTRAVENOUS at 09:11

## 2022-08-12 RX ADMIN — METHYLPREDNISOLONE SODIUM SUCCINATE 40 MG: 125 INJECTION, POWDER, FOR SOLUTION INTRAMUSCULAR; INTRAVENOUS at 21:24

## 2022-08-12 RX ADMIN — SODIUM CHLORIDE, PRESERVATIVE FREE 10 ML: 5 INJECTION INTRAVENOUS at 21:24

## 2022-08-12 RX ADMIN — HYOSCYAMINE SULFATE 125 MCG: 0.12 TABLET ORAL at 09:05

## 2022-08-12 RX ADMIN — DIPHENOXYLATE HYDROCHLORIDE AND ATROPINE SULFATE 1 TABLET: 2.5; .025 TABLET ORAL at 21:24

## 2022-08-12 RX ADMIN — NYSTATIN 500000 UNITS: 100000 SUSPENSION ORAL at 21:24

## 2022-08-12 RX ADMIN — SODIUM CHLORIDE 125 MG: 9 INJECTION, SOLUTION INTRAVENOUS at 09:22

## 2022-08-12 RX ADMIN — NYSTATIN 500000 UNITS: 100000 SUSPENSION ORAL at 14:52

## 2022-08-12 RX ADMIN — NYSTATIN 500000 UNITS: 100000 SUSPENSION ORAL at 09:09

## 2022-08-12 RX ADMIN — DIPHENOXYLATE HYDROCHLORIDE AND ATROPINE SULFATE 1 TABLET: 2.5; .025 TABLET ORAL at 17:38

## 2022-08-12 ASSESSMENT — PAIN SCALES - GENERAL
PAINLEVEL_OUTOF10: 7
PAINLEVEL_OUTOF10: 2
PAINLEVEL_OUTOF10: 9
PAINLEVEL_OUTOF10: 7
PAINLEVEL_OUTOF10: 9
PAINLEVEL_OUTOF10: 9

## 2022-08-12 ASSESSMENT — PAIN DESCRIPTION - LOCATION
LOCATION: ABDOMEN

## 2022-08-12 ASSESSMENT — PAIN DESCRIPTION - DESCRIPTORS
DESCRIPTORS: ACHING;CRAMPING
DESCRIPTORS: ACHING;CRAMPING
DESCRIPTORS: ACHING;CRAMPING;SHARP;DISCOMFORT

## 2022-08-12 NOTE — PROGRESS NOTES
Hospitalist Progress Note   Admit Date:  2022  3:17 PM   Name:  Ahsan Nava   Age:  65 y.o. Sex:  female  :  1960   MRN:  [id number]   Room:  /    Presenting Complaint: Nausea     Reason(s) for Admission: Dehydration [E86.0]  Hypokalemia [E87.6]  JULIETH (acute kidney injury) ([facility] [address] [N17.9]  Diarrhea, unspecified type [R19.7]  Acute renal failure superimposed on chronic kidney disease, unspecified CKD stage, unspecified acute renal failure type ([facility] [address] [N17.9, N18.9]     History of Present Illness:       Ahsan Nava is a 65 y.o. female with medical history of crohns disease, HTN, CKD3, depression, HLP, RLS, who is evaluated with complaints os several weeks of nausea, emesis and abdominal pain. She was seen for possible crohn's flare by GI last week. She was sent for CT chest/ AP but is not aware of the results. She did stool studies. Not improving at home with Pedialyte. She has ongoing metallic taste, anorexia, weight loss, dry skin, abdominal pain , decreased urination, diarrhea, nausea. Some dysuria. She has been mostly in the bed due to weakness, feels shaky. Has rectal spasms, burping and hiccups. Denies rectal bleeding. No fever. She is out of her [facility] for 5 days. She is followed by nephrology but not seen by 4-5 years. She thinks her renal function was about 40 %. Prior creatinine <2.0. today 3.80. potassium 2.8, sodium 127. HGB 10.0. FULL CODE     Melita Duane [phone]          Review of Systems:  10 systems reviewed and negative except as noted in HPI. - no vision change, no ear or throat pain, no chest pain no dyspnea or cough     2022  Patient seen and evaluated.   Had increased abdominal pain and cramping yesterday when transition to p.o. meds  Now back on IV Solu-Medrol  she does not personally feel like the [facility] is working  Afebrile since admission  Not yet ready for discharge per GI   all questions answered      Assessment & Plan: Principal Problem:    JULIETH (acute kidney injury) ([facility] [address]  CKD (chronic kidney disease) stage 3, GFR 30-59 ml/min ([facility]  8/12/2022  Continue IV fluid hydration-we will decrease the rate of fluids to 100 mils per hour  Renal function currently stable and improving with hydration; continue to trend  continue to monitor electrolytes and correct as needed  Nephrology input appreciated    Active Problems:    Crohn's disease ([facility]  With flare:  GI input is appreciated  Addition to p.o. prednisone today  Will monitor output   had CTs last week  She is out of her xeljanz-does not feel this medication is working for her  C DIFF was negative hydrate           Hyponatremia  8/12/2022  Resolved            Hypertension  8/12/2022  Continue to hold home meds for now  As needed blood pressure meds as needed        Hyperlipidemia  8/12/2022    Continue statin         Depression  8/12/2022     Continue Wellbutrin and zoloft             RLS (restless legs syndrome)  8/12/2022    Continue to hold Requip in the setting of JULIETH        Acute hypokalemia  8/12/2022    Currently resolved   continue to monitor and replace as needed   magnesium replaced August 10, 2022           Anemia  8/12/2022    HGB 10.0-->8.8--> 7.5  For IV iron today  Follow-up occult stool, iron panel, b12, folate         Thrush:  8/12/2022  Continue nystatin        Dispo/Discharge Planning:   TBD per GI    Diet: ADULT DIET;  Regular  ADULT ORAL NUTRITION SUPPLEMENT; Breakfast, Lunch, Dinner; Clear Liquid Oral Supplement  VTE ppx: SCD  Code status: Full Code    Hospital Problems:  Principal Problem:    JULIETH (acute kidney injury) ([facility]  Active Problems:    Crohn's disease ([facility]    Hyponatremia    Hypertension    Hyperlipidemia    Depression    Moderate protein-calorie malnutrition (HCC)    Iron deficiency anemia due to chronic blood loss    RLS (restless legs syndrome)    CKD (chronic kidney disease) stage 3, GFR 30-59 ml/min (HCC)    Acute hypokalemia    Anemia Essential hypertension, benign  Resolved Problems:    * No resolved hospital problems. *       Past History:     Past Medical History:   Diagnosis Date    Depression     managed with medication     Disc prolapse     L4 to L5    DJD (degenerative joint disease)     GERD (gastroesophageal reflux disease)     managed with medication     Hypertension     managed with medication     Obesity (BMI 30-39. 9)     BMI 31.5    Osteoarthritis     Steroid-induced diabetes mellitus ([address] 6/5/2013    Steroid-induced diabetes mellitus ([address] 6/5/2013    no present medication needed     Ulcerative colitis ([address]     managed with medication        Past Surgical History:   Procedure Laterality Date    COLONOSCOPY  2009    showed UC    FLEXIBLE SIGMOIDOSCOPY N/A 5/11/2016    SIGMOIDOSCOPY FLEXIBLE/   BMI 36 performed by Dima Mckoy MD at [place] [address]    left mastoidectomy    HEENT  1971    tympanoplasty    INNER EAR SURGERY PROC UNLISTED  48,67,12    mastoid cyst +TM repair-hearing loss, left ear    TONSILLECTOMY  1971         VASCULAR SURGERY  06/5/2013 [place]    port placement    VASCULAR SURGERY  2013    port placement for remicade        Social History     Tobacco Use    Smoking status: Every Day     Packs/day: 1.00     Types: Cigarettes    Smokeless tobacco: Never   Substance Use Topics    Alcohol use: No      Social History     Substance and Sexual Activity   Drug Use No       Family History   Problem Relation Age of Onset    Osteoarthritis Brother     Heart Disease Father     Hypertension Father     Breast Cancer Neg Hx     Diabetes Mother         type I            There is no immunization history on file for this patient.   Allergies   Allergen Reactions    Codeine Nausea Only     Prior to Admit Medications:  Current Outpatient Medications   Medication Instructions    acetaminophen (TYLENOL) 650 mg, Oral, EVERY 6 HOURS PRN    buPROPion (WELLBUTRIN XL) 150 mg, Oral, EVERY MORNING    dexlansoprazole (DEXILANT) 60 mg, Oral    irbesartan-hydroCHLOROthiazide (AVALIDE) 150-12.5 MG per tablet 1 tablet, Oral, EVERY OTHER DAY    LORazepam (ATIVAN) 1 mg, Oral    mesalamine (DELZICOL) 400 mg, 2 TIMES DAILY    pravastatin (PRAVACHOL) 80 mg, Oral    predniSONE 10 MG (21) TBPK SEE ADMIN INSTRUCTIONS    Probiotic Product (ACIDOPHILUS PROBIOTIC) CAPS capsule 1 tablet, Oral    rOPINIRole (REQUIP) 0.5 mg, Oral    sertraline (ZOLOFT) 100 mg, Oral    traMADol (ULTRAM) 50 mg, Oral, EVERY 8 HOURS PRN    valsartan-hydroCHLOROthiazide (DIOVAN-HCT) 160-25 MG per tablet 1 tablet, Oral         Objective:   Patient Vitals for the past 24 hrs:   Temp Pulse Resp BP SpO2   08/12/22 0320 98 °F (36.7 °C) 89 18 111/70 99 %   08/11/22 2257 97.9 °F (36.6 °C) 90 18 115/71 98 %   08/11/22 1910 97.9 °F (36.6 °C) 87 18 126/81 99 %   08/11/22 1741 -- -- 18 -- --   08/11/22 1711 -- -- 16 -- --   08/11/22 1609 98.3 °F (36.8 °C) 100 18 100/70 --   08/11/22 1526 98.3 °F (36.8 °C) 100 18 100/70 97 %   08/11/22 1130 -- -- 18 -- --   08/11/22 1058 98.3 °F (36.8 °C) 85 18 111/67 100 %         Oxygen Therapy  SpO2: 99 %  O2 Device: None (Room air)    Estimated body mass index is 23.82 kg/m² as calculated from the following:    Height as of this encounter: 5' 3\" (1.6 m). Weight as of this encounter: 134 lb 7.7 oz (61 kg). Intake/Output Summary (Last 24 hours) at 8/12/2022 0814  Last data filed at 8/12/2022 2382  Gross per 24 hour   Intake 2730 ml   Output 1300 ml   Net 1430 ml           Physical Exam:    Blood pressure 111/70, pulse 89, temperature 98 °F (36.7 °C), temperature source Oral, resp. rate 18, height 5' 3\" (1.6 m), weight 134 lb 7.7 oz (61 kg), SpO2 99 %. General:    Elderly, no distress, alert , pleasant   Head:  Normocephalic, atraumatic  Eyes:  Sclerae appear normal.  Pupils equally round. ENT:  Nares appear normal, no drainage. Dry  oral mucosa with thrush   Neck:  No restricted ROM. Trachea midline   CV:   RRR. No m/r/g.   No jugular venous distension. No edema   Lungs:   CTAB. No wheezing, rhonchi, or rales. Symmetric expansion. Abdomen: Bowel sounds present. Soft, tender RLQ, nondistended. Extremities: No cyanosis or clubbing. No edema  Skin:     No rashes and normal coloration. Warm and  very dry. Neuro:   grossly intact. Psych:  Normal mood and affect.       I have personally reviewed labs and tests showing:  Recent Labs:  Recent Results (from the past 24 hour(s))   POCT Glucose    Collection Time: 08/11/22 11:20 AM   Result Value Ref Range    POC Glucose 133 (H) 65 - 100 mg/dL    Performed by: Humberto    POCT Glucose    Collection Time: 08/11/22  4:12 PM   Result Value Ref Range    POC Glucose 168 (H) 65 - 100 mg/dL    Performed by: Afia Mena    POCT Glucose    Collection Time: 08/11/22  8:42 PM   Result Value Ref Range    POC Glucose 126 (H) 65 - 100 mg/dL    Performed by: Mara    Basic Metabolic Panel    Collection Time: 08/12/22  3:32 AM   Result Value Ref Range    Sodium 135 (L) 136 - 145 mmol/L    Potassium 3.8 3.5 - 5.1 mmol/L    Chloride 109 (H) 98 - 107 mmol/L    CO2 22 21 - 32 mmol/L    Anion Gap 4 (L) 7 - 16 mmol/L    Glucose 106 (H) 65 - 100 mg/dL    BUN 15 8 - 23 MG/DL    Creatinine 1.10 (H) 0.6 - 1.0 MG/DL    GFR African American >60 >60 ml/min/1.73m2    GFR Non- 54 (L) >60 ml/min/1.73m2    Calcium 7.4 (L) 8.3 - 10.4 MG/DL   CBC    Collection Time: 08/12/22  3:32 AM   Result Value Ref Range    WBC 4.3 4.3 - 11.1 K/uL    RBC 2.47 (L) 4.05 - 5.2 M/uL    Hemoglobin 7.5 (L) 11.7 - 15.4 g/dL    Hematocrit 22.4 (L) 35.8 - 46.3 %    MCV 90.7 79.6 - 97.8 FL    MCH 30.4 26.1 - 32.9 PG    MCHC 33.5 31.4 - 35.0 g/dL    RDW 13.2 11.9 - 14.6 %    Platelets 798 (L) 671 - 450 K/uL    MPV 8.0 (L) 9.4 - 12.3 FL    nRBC 0.00 0.0 - 0.2 K/uL   POCT Glucose    Collection Time: 08/12/22  8:01 AM   Result Value Ref Range    POC Glucose 111 (H) 65 - 100 mg/dL    Performed by: Nehemiah        I have personally reviewed imaging studies showing:  No results found. Echocardiogram:  No results found for this or any previous visit.       Meds previously ordered:  Orders Placed This Encounter   Medications    lactated ringers bolus    ondansetron (ZOFRAN) injection 4 mg    morphine injection 2 mg    potassium chloride 10 mEq/100 mL IVPB (Peripheral Line)    buPROPion (WELLBUTRIN SR) extended release tablet 150 mg    pantoprazole (PROTONIX) tablet 40 mg    pravastatin (PRAVACHOL) tablet 80 mg    sertraline (ZOLOFT) tablet 100 mg    sodium chloride flush 0.9 % injection 5-40 mL    sodium chloride flush 0.9 % injection 5-40 mL    0.9 % sodium chloride infusion    OR Linked Order Group     ondansetron (ZOFRAN-ODT) disintegrating tablet 4 mg     ondansetron (ZOFRAN) injection 4 mg    polyethylene glycol (GLYCOLAX) packet 17 g    OR Linked Order Group     acetaminophen (TYLENOL) tablet 650 mg     acetaminophen (TYLENOL) suppository 650 mg    0.9 % sodium chloride infusion    nystatin (MYCOSTATIN) 585620 UNIT/ML suspension 500,000 Units    morphine injection 2 mg    rOPINIRole (REQUIP) tablet 0.5 mg    DISCONTD: methylPREDNISolone sodium (SOLU-MEDROL) injection 40 mg    hyoscyamine (LEVSIN/SL) sublingual tablet 125 mcg    diphenoxylate-atropine (LOMOTIL) 2.5-0.025 MG per tablet 1 tablet    magnesium sulfate 4000 mg in 100 mL IVPB premix    potassium chloride 10 mEq/100 mL IVPB (Peripheral Line)    potassium chloride 10 mEq/100 mL IVPB (Peripheral Line)    potassium chloride (KLOR-CON M) extended release tablet 20 mEq    DISCONTD: iron sucrose (VENOFER) 300 mg in sodium chloride 0.9 % 250 mL IVPB    insulin lispro (HUMALOG) injection vial 0-4 Units    insulin lispro (HUMALOG) injection vial 0-4 Units    glucose chewable tablet 16 g    OR Linked Order Group     dextrose bolus 10% 125 mL     dextrose bolus 10% 250 mL    glucagon (rDNA) injection 1 mg    dextrose 10 % infusion    ferric gluconate (FERRLECIT) 125 mg in sodium chloride 0.9 % 100 mL IVPB    DISCONTD: cefTRIAXone (ROCEPHIN) 1,000 mg in sodium chloride 0.9 % 50 mL IVPB mini-bag     Order Specific Question:   Antimicrobial Indications     Answer:   Urinary Tract Infection     Order Specific Question:   UTI duration of therapy     Answer:   3 days    DISCONTD: predniSONE (DELTASONE) tablet 30 mg    OR Linked Order Group     traMADol (ULTRAM) tablet 50 mg     traMADol (ULTRAM) tablet 100 mg    methylPREDNISolone sodium (SOLU-MEDROL) injection 40 mg    iron sucrose (VENOFER) 200 mg in sodium chloride 0.9 % 100 mL IVPB     Please substitute with whatever iron is available    ferrous sulfate (IRON 325) tablet 325 mg           Signed:  Randi Baron MD    Part of this note may have been written by using a voice dictation software. The note has been proof read but may still contain some grammatical/other typographical errors.

## 2022-08-12 NOTE — FLOWSHEET NOTE
Patient received in stable condition on room air. Respirations present and unlabored. Safety measures in place. No needs expressed at this time. Call bell within reach and encouraged to call with needs.       08/12/22 0620   Handoff   Communication Given Shift Handoff   Handoff Received From Viviana Schafer RN   Handoff Communication Face to Face   Time Handoff Given 7028   End of Shift Check Performed Yes

## 2022-08-12 NOTE — PROGRESS NOTES
Comprehensive Nutrition Assessment    Type and Reason for Visit: Reassess  Malnutrition Screening Tool: Malnutrition Screen  Have you recently lost weight without trying?: 14 to 23 pounds (2 points)  Have you been eating poorly because of a decreased appetite?: Yes (1 point)  Malnutrition Screening Tool Score: 3    Nutrition Recommendations/Plan:   Meals and Snacks:  Diet: Continue current order  Encourage pt to have family/spouse provide preferred meals/food to increase kcal and protein intake during the day  Nutrition Supplement Therapy:  Medical food supplement therapy:  Continue Ensure Clear three times per day (this provides 240 kcal and 8 grams protein per bottle)     Malnutrition Assessment:  Malnutrition Status: Moderate malnutrition  Context: Chronic Illness  Findings of clinical characteristics of malnutrition:   Energy Intake:  Mild decrease in energy intake (Comment) (bites at meals x3 weeks)  Weight Loss:  Mild weight loss (specify amount and time period) (20% wt loss since 6/2021)     Body Fat Loss:  Mild body fat loss Triceps, Fat Overlying Ribs   Muscle Mass Loss:  Mild muscle mass loss Clavicles (pectoralis & deltoids), Thigh (quadraceps), Calf (gastrocnemius), Scapula (trapezius), Hand (interosseous)  Fluid Accumulation:  No significant fluid accumulation     Strength:  Not Performed  Nutrition Assessment:  Nutrition History: Pt states she has been having poor po intake x3 weeks PTA consuming bites at meals. She reports taste changes, loss of appetite, and N/V/D. Pt reports ongoing wt loss of the past 2 years weighing 200lbs prior to wt loss. Per EMR, pt weighed 167lbs 6/8/21. Do You Have Any Cultural, Mosque, or Ethnic Food Preferences?: No   Nutrition Background:   Pt with PMH notable for Crohn's disease of large and small bowel, GERD, HTN, OA, and CKD III. Pt presents d/t diarrhea, nausea, abdominal cramping and decreased po intake. Pt admitted with JULIETH and Crohn's flare.   Nutrition Interval:  Pt observed in bed at time of visit. She reports taking bites at meals and not drinking Ensure Clear. She reports not drinking ONS d/t concern of it increasing her glucose. Discussed importance of adequate nutrition intake and informed pt she has SSI available to control blood sugar if needed. Encouraged pt to eat more than a few bites at meals and drink ONS between meals. Pt states she is agreeable to try Ensure High Protein. Both Ensure Clear and High Protein provided for pt to try. Other barriers to intake stated by pt is loss of appetite, ongoing taste changes, and disliking hospital food. Discussed other options pt can choose from when pt dining associate arrives to help select meal options. Pt states she has her  bringing something for lunch if she dislikes what is given at lunch. Encouraged pt to have spouse or other visitors provide meals she prefers. Nutrition Related Findings:   NFPE findings stated above      Current Nutrition Therapies:  ADULT DIET; Regular  ADULT ORAL NUTRITION SUPPLEMENT; Breakfast, Lunch, Dinner; Clear Liquid Oral Supplement    Current Intake:   Average Meal Intake: 1-25% Average Supplements Intake: 0%      Anthropometric Measures:  Height: 5' 3\" (160 cm)  Current Body Wt: 134 lb 7.7 oz (61 kg) (8/10), Weight source: Bed Scale  BMI: 23.8, Normal Weight (BMI 18.5-24. 9)  Admission Body Weight: 133 lb 13.1 oz (60.7 kg) (8/9; bed scale)  Ideal Body Weight (Kg) (Calculated): 52 kg (115 lbs), 116.4 %  Usual Body Wt: 167 lb (75.8 kg) (6/8/21; MD office visit), Percent weight change: -19.9       Edema:    BMI Category Normal Weight (BMI 18.5-24. 9)  Estimated Daily Nutrient Needs:  Energy (kcal/day): 5179-0198 (Kcal/kg (25-30) Weight used:   Current (60.7kg)  Protein (g/day): 76-91 Weight Used: (Other (Comment) (20% kcal))    Fluid (ml/day):   (1 ml/kcal)    Nutrition Diagnosis:   Inadequate oral intake related to altered GI function, altered taste perception (loss of appetite, food preferences) as evidenced by  (pt reports barriers to intake)  Moderate malnutrition, In context of chronic illness related to inadequate protein-energy intake as evidenced by Criteria as identified in malnutrition assessment  Nutrition Interventions:   Food and/or Nutrient Delivery: Continue Current Diet, Continue Oral Nutrition Supplement     Coordination of Nutrition Care: Continue to monitor while inpatient       Goals:   Previous Goal Met: No Progress toward Goal(s)  Active Goal: PO intake 50% or greater, by next RD assessment       Nutrition Monitoring and Evaluation:      Food/Nutrient Intake Outcomes: Food and Nutrient Intake, Supplement Intake  Physical Signs/Symptoms Outcomes: GI Status, Meal Time Behavior, Weight    Discharge Planning:     Too soon to determine    Afia Tipton RD

## 2022-08-12 NOTE — PROGRESS NOTES
Meena Parham  Admission Date: 8/8/2022         [facility] Progress Note: 8/12/2022    Follow-up for: JULIETH/CKD stage 3b    The patient's chart is reviewed and the patient is discussed with the staff. Subjective:     Pt seen and examined in room. Pt feeling better, no complaints.  Pt reports her diarrhea has significantly improved sine starting lomotil    ROS:  No CP, no sob, no edema, no n/v    Current Facility-Administered Medications   Medication Dose Route Frequency    methylPREDNISolone sodium (SOLU-MEDROL) injection 40 mg  40 mg IntraVENous Q12H    ferrous sulfate (IRON 325) tablet 325 mg  325 mg Oral BID WC    traMADol (ULTRAM) tablet 50 mg  50 mg Oral Q6H PRN    Or    traMADol (ULTRAM) tablet 100 mg  100 mg Oral Q6H PRN    potassium chloride (KLOR-CON M) extended release tablet 20 mEq  20 mEq Oral BID WC    insulin lispro (HUMALOG) injection vial 0-4 Units  0-4 Units SubCUTAneous TID WC    insulin lispro (HUMALOG) injection vial 0-4 Units  0-4 Units SubCUTAneous Nightly    glucose chewable tablet 16 g  4 tablet Oral PRN    dextrose bolus 10% 125 mL  125 mL IntraVENous PRN    Or    dextrose bolus 10% 250 mL  250 mL IntraVENous PRN    glucagon (rDNA) injection 1 mg  1 mg SubCUTAneous PRN    dextrose 10 % infusion   IntraVENous Continuous PRN    hyoscyamine (LEVSIN/SL) sublingual tablet 125 mcg  125 mcg SubLINGual TID    diphenoxylate-atropine (LOMOTIL) 2.5-0.025 MG per tablet 1 tablet  1 tablet Oral 4x Daily    buPROPion (WELLBUTRIN SR) extended release tablet 150 mg  150 mg Oral QAM    pantoprazole (PROTONIX) tablet 40 mg  40 mg Oral QAM AC    pravastatin (PRAVACHOL) tablet 80 mg  80 mg Oral Nightly    sertraline (ZOLOFT) tablet 100 mg  100 mg Oral Daily    sodium chloride flush 0.9 % injection 5-40 mL  5-40 mL IntraVENous 2 times per day    sodium chloride flush 0.9 % injection 5-40 mL  5-40 mL IntraVENous PRN    0.9 % sodium chloride infusion   IntraVENous PRN    ondansetron (ZOFRAN-ODT) disintegrating tablet 4 mg  4 mg Oral Q8H PRN    Or    ondansetron (ZOFRAN) injection 4 mg  4 mg IntraVENous Q6H PRN    polyethylene glycol (GLYCOLAX) packet 17 g  17 g Oral Daily PRN    acetaminophen (TYLENOL) tablet 650 mg  650 mg Oral Q6H PRN    Or    acetaminophen (TYLENOL) suppository 650 mg  650 mg Rectal Q6H PRN    0.9 % sodium chloride infusion   IntraVENous Continuous    nystatin (MYCOSTATIN) [id number] UNIT/ML suspension 500,000 Units  5 mL Oral 4x Daily    morphine injection 2 mg  2 mg IntraVENous Q4H PRN    rOPINIRole (REQUIP) tablet 0.5 mg  0.5 mg Oral Nightly         Objective:     Vitals:    08/12/22 0709 08/12/22 0902 08/12/22 0932 08/12/22 1113   BP: 123/76   137/80   Pulse: 85   98   Resp: 18 16 16 18   Temp: 98 °F (36.7 °C)   98.2 °F (36.8 °C)   TempSrc: Oral   Oral   SpO2: 98%   100%   Weight:       Height:         Intake and Output:   08/10 1901 - 08/12 0700  In: 2213 [P.O.:720; I.V.:2010]  Out: 1300 [Urine:1300]  08/12 0701 - 08/12 1900  In: 110 [P.O.:100; I.V.:10]  Out: 250 [Urine:250]    Physical Exam:   Constitutional:  the patient is well developed and in no acute distress  HEENT:  Sclera clear, pupils equal, oral mucosa moist  Lungs: clear bilaterally  Cardiovascular:  RRR no rub  Abd/GI: soft and non-tender; with positive bowel sounds. Ext: warm without cyanosis. There is no lower leg edema.   Musculoskeletal: moves all four extremities with equal strength  Skin:  no jaundice or rashes, no wounds   Neuro: no gross neuro deficits         LAB  Recent Labs     08/10/22  0504 08/11/22  0453 08/12/22  0332   WBC 6.0 5.0 4.3   HGB 7.8* 7.7* 7.5*   HCT 22.8* 23.0* 22.4*    169 147*       Recent Labs     08/09/22  1552 08/10/22  0504 08/11/22  0453 08/12/22  0332   NA  --  131* 136 135*   K  --  3.1* 3.6 3.8   CL  --  101 109* 109*   CO2  --  26 23 22   BUN  --  30* 21 15   CREATININE  --  1.80* 1.30* 1.10*   MG  --  2.0  --   --    PHOS 2.5  --   --   --        No results for input(s): PH, PCO2, PO2, HCO3 in the last 72 hours. Plan:  (Medical Decision Making)   1. JULIETH/CKD 3b likely pre renal azotemia 2/2 GI looses, N/V/D  -baseline Cr 1.6-1.9.  -Renal ultrasound with mild hydronephrosis - upon review today pt has had some issues with urination at of late. Spoke with Phillip Escudero NP with urology, will schedule outpatient follow-up  -P/C ratio 0.47  -renal function significantly improved today, appears back to patient's baseline,      2. Hypokalemia- resolved     3. Hyponatremia- improving with NS, l    4. Nausea/vomiting/diarrhea/abdominal pain, hx of ulcerative colitis, Crohn's flare, GI following on On solumedrol, PPI, levsin and lomotil     5.  Anemia- iron deficient, primary team repleting    Manuel Bansal MD  [facility]

## 2022-08-12 NOTE — PROGRESS NOTES
8/12/2022    Admit Date: 8/8/2022    Subjective:   CHIEF COMPLAINT- diarrhea  HPI      Overall-not doing well           Diet-reg    Appetite-poor     Nausea-min   Vomiting-no          Pain-mild- takes tramadol at home- worse overnite            BM-minimal   Bleeding-no    Medications-reviewed and adjusted as appropriate   IV FLUIDS-reviewed      FAM HX-per H&P   SH-per H&P   Tob-yes            Etoh-no      Past Medical History:   Diagnosis Date    Depression     managed with medication     Disc prolapse     L4 to L5    DJD (degenerative joint disease)     GERD (gastroesophageal reflux disease)     managed with medication     Hypertension     managed with medication     Obesity (BMI 30-39. 9)     BMI 31.5    Osteoarthritis     Steroid-induced diabetes mellitus ([facility] [address] 6/5/2013    Steroid-induced diabetes mellitus ([facility] [address] 6/5/2013    no present medication needed     Ulcerative colitis ([facility] [address]     managed with medication                Past Surgical History:   Procedure Laterality Date    COLONOSCOPY  2009    showed UC    FLEXIBLE SIGMOIDOSCOPY N/A 5/11/2016    SIGMOIDOSCOPY FLEXIBLE/   BMI 36 performed by Purnima Alvarez MD at [facility] 10    left mastoidectomy    HEENT  1971    tympanoplasty    INNER EAR SURGERY PROC UNLISTED  63,80,14    mastoid cyst +TM repair-hearing loss, left ear    TONSILLECTOMY  1971         VASCULAR SURGERY  06/5/2013 [facility]    port placement    VASCULAR SURGERY  2013    port placement for remicade       ROS--                 RESP-neg            CARDIAC-neg                       -neg             Further ROS as per PMH and PSH- see problem list                            Objective:     Visit Vitals  /70   Pulse 89   Temp 98 °F (36.7 °C) (Oral)   Resp 18   Ht 5' 3\" (1.6 m)   Wt 134 lb 7.7 oz (61 kg)   SpO2 99%   BMI 23.82 kg/m²         Intake/Output Summary (Last 24 hours) at 8/12/2022 0750  Last data filed at 8/12/2022 0622  Gross per 24 hour   Intake 2730 ml   Output 1300 ml Net 1430 ml         EXAM:     NEURO-a&o    HEENT-wnl    LUNGS-clear       COR-rrr        ABD-soft , min tenderness, no rebound, good bs        EXT-no edema                         LABS-  Lab Results   Component Value Date/Time    WBC 4.3 08/12/2022 03:32 AM    RBC 2.47 08/12/2022 03:32 AM    HGB 7.5 08/12/2022 03:32 AM    HCT 22.4 08/12/2022 03:32 AM     08/12/2022 03:32 AM     Lab Results   Component Value Date/Time     08/12/2022 03:32 AM    K 3.8 08/12/2022 03:32 AM     08/12/2022 03:32 AM    CO2 22 08/12/2022 03:32 AM    BUN 15 08/12/2022 03:32 AM    GFRAA >60 08/12/2022 03:32 AM    MG 2.0 08/10/2022 05:04 AM    PHOS 2.5 08/09/2022 03:52 PM    GLOB 4.4 08/09/2022 04:26 AM    ALT 12 08/09/2022 04:26 AM          Assessment:     Principal Problem:    JULIETH (acute kidney injury) ([facility] [address]  Active Problems:    Crohn's disease (HCC)    Hyponatremia    Hypertension    Hyperlipidemia    Depression    Moderate protein-calorie malnutrition (HCC)    RLS (restless legs syndrome)    CKD (chronic kidney disease) stage 3, GFR 30-59 ml/min ([facility])    Acute hypokalemia    Anemia    Essential hypertension, benign      Renal function improved  Sxs better  Mag and k low  Iron low    8/11/22  Almost ready for discharge  Cont ivf for JULIETH  Switch to prednisone  Tramadol prn     8/12/22  Did not tolerate po switch  Afraid if she goes home she will be right back  Creat better   Does not like to take pain meds      Plan:     Back to iv steroids  Iv iron  Ivf   Not ready for discharge         [address]   Smiley Louis MD

## 2022-08-13 ENCOUNTER — APPOINTMENT (OUTPATIENT)
Dept: CT IMAGING | Age: 62
DRG: 386 | End: 2022-08-13
Payer: COMMERCIAL

## 2022-08-13 LAB
ANION GAP SERPL CALC-SCNC: 7 MMOL/L (ref 7–16)
BASOPHILS # BLD: 0 K/UL (ref 0–0.2)
BASOPHILS NFR BLD: 0 % (ref 0–2)
BUN SERPL-MCNC: 13 MG/DL (ref 8–23)
CALCIUM SERPL-MCNC: 7.1 MG/DL (ref 8.3–10.4)
CHLORIDE SERPL-SCNC: 106 MMOL/L (ref 98–107)
CO2 SERPL-SCNC: 20 MMOL/L (ref 21–32)
CREAT SERPL-MCNC: 1 MG/DL (ref 0.6–1)
DIFFERENTIAL METHOD BLD: ABNORMAL
EOSINOPHIL # BLD: 0 K/UL (ref 0–0.8)
EOSINOPHIL NFR BLD: 0 % (ref 0.5–7.8)
ERYTHROCYTE [DISTWIDTH] IN BLOOD BY AUTOMATED COUNT: 13.4 % (ref 11.9–14.6)
GLUCOSE BLD STRIP.AUTO-MCNC: 114 MG/DL (ref 65–100)
GLUCOSE BLD STRIP.AUTO-MCNC: 116 MG/DL (ref 65–100)
GLUCOSE BLD STRIP.AUTO-MCNC: 124 MG/DL (ref 65–100)
GLUCOSE BLD STRIP.AUTO-MCNC: 90 MG/DL (ref 65–100)
GLUCOSE BLD STRIP.AUTO-MCNC: 91 MG/DL (ref 65–100)
GLUCOSE SERPL-MCNC: 95 MG/DL (ref 65–100)
HCT VFR BLD AUTO: 22.8 % (ref 35.8–46.3)
HGB BLD-MCNC: 7.6 G/DL (ref 11.7–15.4)
IMM GRANULOCYTES # BLD AUTO: 0.1 K/UL (ref 0–0.5)
IMM GRANULOCYTES NFR BLD AUTO: 3 % (ref 0–5)
LACTATE SERPL-SCNC: 0.7 MMOL/L (ref 0.4–2)
LYMPHOCYTES # BLD: 0.2 K/UL (ref 0.5–4.6)
LYMPHOCYTES NFR BLD: 5 % (ref 13–44)
MCH RBC QN AUTO: 30.6 PG (ref 26.1–32.9)
MCHC RBC AUTO-ENTMCNC: 33.3 G/DL (ref 31.4–35)
MCV RBC AUTO: 91.9 FL (ref 79.6–97.8)
MONOCYTES # BLD: 0.2 K/UL (ref 0.1–1.3)
MONOCYTES NFR BLD: 5 % (ref 4–12)
NEUTS SEG # BLD: 3.9 K/UL (ref 1.7–8.2)
NEUTS SEG NFR BLD: 86 % (ref 43–78)
NRBC # BLD: 0.02 K/UL (ref 0–0.2)
PLATELET # BLD AUTO: 138 K/UL (ref 150–450)
PMV BLD AUTO: 8.4 FL (ref 9.4–12.3)
POTASSIUM SERPL-SCNC: 3.8 MMOL/L (ref 3.5–5.1)
PROCALCITONIN SERPL-MCNC: <0.05 NG/ML (ref 0–0.49)
RBC # BLD AUTO: 2.48 M/UL (ref 4.05–5.2)
SERVICE CMNT-IMP: ABNORMAL
SERVICE CMNT-IMP: NORMAL
SERVICE CMNT-IMP: NORMAL
SODIUM SERPL-SCNC: 133 MMOL/L (ref 136–145)
WBC # BLD AUTO: 4.5 K/UL (ref 4.3–11.1)

## 2022-08-13 PROCEDURE — 80048 BASIC METABOLIC PNL TOTAL CA: CPT

## 2022-08-13 PROCEDURE — 6360000004 HC RX CONTRAST MEDICATION: Performed by: INTERNAL MEDICINE

## 2022-08-13 PROCEDURE — 82962 GLUCOSE BLOOD TEST: CPT

## 2022-08-13 PROCEDURE — 6370000000 HC RX 637 (ALT 250 FOR IP): Performed by: INTERNAL MEDICINE

## 2022-08-13 PROCEDURE — 6360000002 HC RX W HCPCS: Performed by: INTERNAL MEDICINE

## 2022-08-13 PROCEDURE — 85025 COMPLETE CBC W/AUTO DIFF WBC: CPT

## 2022-08-13 PROCEDURE — 74177 CT ABD & PELVIS W/CONTRAST: CPT

## 2022-08-13 PROCEDURE — 6370000000 HC RX 637 (ALT 250 FOR IP): Performed by: HOSPITALIST

## 2022-08-13 PROCEDURE — 2580000003 HC RX 258: Performed by: INTERNAL MEDICINE

## 2022-08-13 PROCEDURE — 36415 COLL VENOUS BLD VENIPUNCTURE: CPT

## 2022-08-13 PROCEDURE — 84145 PROCALCITONIN (PCT): CPT

## 2022-08-13 PROCEDURE — 6370000000 HC RX 637 (ALT 250 FOR IP): Performed by: PHYSICIAN ASSISTANT

## 2022-08-13 PROCEDURE — 83605 ASSAY OF LACTIC ACID: CPT

## 2022-08-13 PROCEDURE — 1100000000 HC RM PRIVATE

## 2022-08-13 RX ORDER — CIPROFLOXACIN 500 MG/1
250 TABLET, FILM COATED ORAL EVERY 12 HOURS SCHEDULED
Status: DISCONTINUED | OUTPATIENT
Start: 2022-08-13 | End: 2022-08-18

## 2022-08-13 RX ORDER — METRONIDAZOLE 500 MG/1
250 TABLET ORAL EVERY 8 HOURS SCHEDULED
Status: DISCONTINUED | OUTPATIENT
Start: 2022-08-13 | End: 2022-08-18

## 2022-08-13 RX ORDER — HYDROMORPHONE HYDROCHLORIDE 1 MG/ML
1 INJECTION, SOLUTION INTRAMUSCULAR; INTRAVENOUS; SUBCUTANEOUS EVERY 4 HOURS PRN
Status: DISCONTINUED | OUTPATIENT
Start: 2022-08-13 | End: 2022-08-17

## 2022-08-13 RX ADMIN — METRONIDAZOLE 250 MG: 500 TABLET ORAL at 13:17

## 2022-08-13 RX ADMIN — DIATRIZOATE MEGLUMINE AND DIATRIZOATE SODIUM 15 ML: 660; 100 LIQUID ORAL; RECTAL at 14:55

## 2022-08-13 RX ADMIN — HYDROMORPHONE HYDROCHLORIDE 1 MG: 1 INJECTION, SOLUTION INTRAMUSCULAR; INTRAVENOUS; SUBCUTANEOUS at 14:57

## 2022-08-13 RX ADMIN — SODIUM CHLORIDE: 9 INJECTION, SOLUTION INTRAVENOUS at 21:13

## 2022-08-13 RX ADMIN — CIPROFLOXACIN 250 MG: 500 TABLET, FILM COATED ORAL at 21:06

## 2022-08-13 RX ADMIN — HYDROMORPHONE HYDROCHLORIDE 1 MG: 1 INJECTION, SOLUTION INTRAMUSCULAR; INTRAVENOUS; SUBCUTANEOUS at 18:44

## 2022-08-13 RX ADMIN — HYOSCYAMINE SULFATE 125 MCG: 0.12 TABLET ORAL at 13:16

## 2022-08-13 RX ADMIN — ONDANSETRON 4 MG: 2 INJECTION INTRAMUSCULAR; INTRAVENOUS at 06:33

## 2022-08-13 RX ADMIN — DIPHENOXYLATE HYDROCHLORIDE AND ATROPINE SULFATE 1 TABLET: 2.5; .025 TABLET ORAL at 09:06

## 2022-08-13 RX ADMIN — MORPHINE SULFATE 2 MG: 2 INJECTION, SOLUTION INTRAMUSCULAR; INTRAVENOUS at 13:16

## 2022-08-13 RX ADMIN — NYSTATIN 500000 UNITS: 100000 SUSPENSION ORAL at 09:06

## 2022-08-13 RX ADMIN — SODIUM CHLORIDE: 9 INJECTION, SOLUTION INTRAVENOUS at 09:13

## 2022-08-13 RX ADMIN — BUPROPION HYDROCHLORIDE 150 MG: 150 TABLET, EXTENDED RELEASE ORAL at 09:06

## 2022-08-13 RX ADMIN — NYSTATIN 500000 UNITS: 100000 SUSPENSION ORAL at 17:18

## 2022-08-13 RX ADMIN — SERTRALINE HYDROCHLORIDE 100 MG: 25 TABLET ORAL at 09:06

## 2022-08-13 RX ADMIN — ROPINIROLE HYDROCHLORIDE 0.5 MG: 0.5 TABLET, FILM COATED ORAL at 21:07

## 2022-08-13 RX ADMIN — POTASSIUM CHLORIDE 20 MEQ: 20 TABLET, EXTENDED RELEASE ORAL at 17:19

## 2022-08-13 RX ADMIN — ONDANSETRON 4 MG: 2 INJECTION INTRAMUSCULAR; INTRAVENOUS at 13:18

## 2022-08-13 RX ADMIN — HYOSCYAMINE SULFATE 125 MCG: 0.12 TABLET ORAL at 09:06

## 2022-08-13 RX ADMIN — TRAMADOL HYDROCHLORIDE 100 MG: 50 TABLET, COATED ORAL at 06:33

## 2022-08-13 RX ADMIN — SODIUM CHLORIDE, PRESERVATIVE FREE 5 ML: 5 INJECTION INTRAVENOUS at 09:17

## 2022-08-13 RX ADMIN — DIPHENOXYLATE HYDROCHLORIDE AND ATROPINE SULFATE 1 TABLET: 2.5; .025 TABLET ORAL at 21:06

## 2022-08-13 RX ADMIN — POTASSIUM CHLORIDE 20 MEQ: 20 TABLET, EXTENDED RELEASE ORAL at 09:06

## 2022-08-13 RX ADMIN — DIPHENOXYLATE HYDROCHLORIDE AND ATROPINE SULFATE 1 TABLET: 2.5; .025 TABLET ORAL at 13:16

## 2022-08-13 RX ADMIN — NYSTATIN 500000 UNITS: 100000 SUSPENSION ORAL at 21:06

## 2022-08-13 RX ADMIN — METRONIDAZOLE 250 MG: 500 TABLET ORAL at 21:07

## 2022-08-13 RX ADMIN — CIPROFLOXACIN 250 MG: 500 TABLET, FILM COATED ORAL at 09:41

## 2022-08-13 RX ADMIN — METHYLPREDNISOLONE SODIUM SUCCINATE 40 MG: 125 INJECTION, POWDER, FOR SOLUTION INTRAMUSCULAR; INTRAVENOUS at 21:05

## 2022-08-13 RX ADMIN — SODIUM CHLORIDE: 9 INJECTION, SOLUTION INTRAVENOUS at 17:27

## 2022-08-13 RX ADMIN — FERROUS SULFATE TAB 325 MG (65 MG ELEMENTAL FE) 325 MG: 325 (65 FE) TAB at 17:18

## 2022-08-13 RX ADMIN — METHYLPREDNISOLONE SODIUM SUCCINATE 40 MG: 125 INJECTION, POWDER, FOR SOLUTION INTRAMUSCULAR; INTRAVENOUS at 09:06

## 2022-08-13 RX ADMIN — HYOSCYAMINE SULFATE 125 MCG: 0.12 TABLET ORAL at 21:07

## 2022-08-13 RX ADMIN — IOPAMIDOL 100 ML: 755 INJECTION, SOLUTION INTRAVENOUS at 16:52

## 2022-08-13 RX ADMIN — PRAVASTATIN SODIUM 80 MG: 80 TABLET ORAL at 21:06

## 2022-08-13 RX ADMIN — DIPHENOXYLATE HYDROCHLORIDE AND ATROPINE SULFATE 1 TABLET: 2.5; .025 TABLET ORAL at 17:18

## 2022-08-13 RX ADMIN — PANTOPRAZOLE SODIUM 40 MG: 40 TABLET, DELAYED RELEASE ORAL at 06:28

## 2022-08-13 RX ADMIN — SODIUM CHLORIDE, PRESERVATIVE FREE 10 ML: 5 INJECTION INTRAVENOUS at 21:07

## 2022-08-13 RX ADMIN — FERROUS SULFATE TAB 325 MG (65 MG ELEMENTAL FE) 325 MG: 325 (65 FE) TAB at 09:06

## 2022-08-13 RX ADMIN — NYSTATIN 500000 UNITS: 100000 SUSPENSION ORAL at 13:16

## 2022-08-13 ASSESSMENT — PAIN - FUNCTIONAL ASSESSMENT
PAIN_FUNCTIONAL_ASSESSMENT: PREVENTS OR INTERFERES SOME ACTIVE ACTIVITIES AND ADLS

## 2022-08-13 ASSESSMENT — PAIN SCALES - GENERAL
PAINLEVEL_OUTOF10: 7
PAINLEVEL_OUTOF10: 8
PAINLEVEL_OUTOF10: 0
PAINLEVEL_OUTOF10: 8
PAINLEVEL_OUTOF10: 0
PAINLEVEL_OUTOF10: 0

## 2022-08-13 ASSESSMENT — PAIN DESCRIPTION - ONSET
ONSET: GRADUAL

## 2022-08-13 ASSESSMENT — PAIN DESCRIPTION - LOCATION
LOCATION: ABDOMEN

## 2022-08-13 ASSESSMENT — PAIN DESCRIPTION - PAIN TYPE
TYPE: ACUTE PAIN

## 2022-08-13 ASSESSMENT — PAIN DESCRIPTION - DESCRIPTORS
DESCRIPTORS: STABBING
DESCRIPTORS: STABBING
DESCRIPTORS: ACHING;CRAMPING;SPASM
DESCRIPTORS: STABBING

## 2022-08-13 ASSESSMENT — PAIN DESCRIPTION - ORIENTATION
ORIENTATION: LOWER
ORIENTATION: INNER

## 2022-08-13 ASSESSMENT — PAIN DESCRIPTION - FREQUENCY
FREQUENCY: INTERMITTENT
FREQUENCY: CONTINUOUS
FREQUENCY: CONTINUOUS

## 2022-08-13 NOTE — PROGRESS NOTES
8/13/2022    Admit Date: 8/8/2022    Subjective:   CHIEF COMPLAINT- diarrhea  HPI      Overall-not doing well- worst nite of her life           Diet-reg    Appetite-poor     Nausea-min   Vomiting-no          Pain-mild- takes tramadol at home- not quite as bad            BM-minimal   Bleeding-no    Medications-reviewed and adjusted as appropriate   IV FLUIDS-reviewed      FAM HX-per H&P   SH-per H&P   Tob-yes            Etoh-no      Past Medical History:   Diagnosis Date    Depression     managed with medication     Disc prolapse     L4 to L5    DJD (degenerative joint disease)     GERD (gastroesophageal reflux disease)     managed with medication     Hypertension     managed with medication     Obesity (BMI 30-39. 9)     BMI 31.5    Osteoarthritis     Steroid-induced diabetes mellitus ([facility] [address] 6/5/2013    Steroid-induced diabetes mellitus ([facility] [address] 6/5/2013    no present medication needed     Ulcerative colitis ([facility] [address]     managed with medication                Past Surgical History:   Procedure Laterality Date    COLONOSCOPY  2009    showed UC    FLEXIBLE SIGMOIDOSCOPY N/A 5/11/2016    SIGMOIDOSCOPY FLEXIBLE/   BMI 36 performed by Ruthine Najjar, MD at [place] [address]    left mastoidectomy    HEENT  1971    tympanoplasty    INNER EAR SURGERY PROC UNLISTED  80,50,50    mastoid cyst +TM repair-hearing loss, left ear    TONSILLECTOMY  1971         VASCULAR SURGERY  06/5/2013 [facility]    port placement    VASCULAR SURGERY  2013    port placement for remicade       ROS--                 RESP-neg            CARDIAC-neg                       -neg             Further ROS as per PMH and PSH- see problem list                            Objective:     Visit Vitals  /71   Pulse 85   Temp 98.6 °F (37 °C) (Oral)   Resp 18   Ht 5' 3\" (1.6 m)   Wt 134 lb 7.7 oz (61 kg)   SpO2 97%   BMI 23.82 kg/m²         Intake/Output Summary (Last 24 hours) at 8/13/2022 0844  Last data filed at 8/13/2022 0604  Gross per 24 hour   Intake 1145 ml   Output 1800 ml   Net -655 ml         EXAM:     NEURO-a&o    HEENT-wnl    LUNGS-clear       COR-rrr        ABD-soft , min tenderness, min  rebound, good bs        EXT-no edema                         LABS-  Lab Results   Component Value Date/Time    WBC 4.3 08/12/2022 03:32 AM    RBC 2.47 08/12/2022 03:32 AM    HGB 7.5 08/12/2022 03:32 AM    HCT 22.4 08/12/2022 03:32 AM     08/12/2022 03:32 AM     Lab Results   Component Value Date/Time     08/12/2022 03:32 AM    K 3.8 08/12/2022 03:32 AM     08/12/2022 03:32 AM    CO2 22 08/12/2022 03:32 AM    BUN 15 08/12/2022 03:32 AM    GFRAA >60 08/12/2022 03:32 AM    MG 2.0 08/10/2022 05:04 AM    PHOS 2.5 08/09/2022 03:52 PM    GLOB 4.4 08/09/2022 04:26 AM    ALT 12 08/09/2022 04:26 AM          Assessment:     Principal Problem:    JULIETH (acute kidney injury) ([facility] [address]  Active Problems:    Crohn's disease (HCC)    Hyponatremia    Hypertension    Hyperlipidemia    Depression    Moderate protein-calorie malnutrition (HCC)    RLS (restless legs syndrome)    CKD (chronic kidney disease) stage 3, GFR 30-59 ml/min ([facility])    Acute hypokalemia    Anemia    Essential hypertension, benign      Renal function improved  Sxs better  Mag and k low  Iron low    8/11/22  Almost ready for discharge  Cont ivf for JULIETH  Switch to prednisone  Tramadol prn     8/12/22  Did not tolerate po switch  Afraid if she goes home she will be right back  Creat better   Does not like to take pain meds    8/13/22  Not any better  Need to consider TPN  There is a stool organism being evaluated  Last CT w/o contrast  Alb 2.0      Plan:     Start cipro/ flagyl  Labs pending  Would start TPN if possible  Got iv iron and started po        PT SEEN AND EXAMINED AND PLAN DISCUSSED AND IMPLEMENTED.   Nino Ball MD

## 2022-08-13 NOTE — PROGRESS NOTES
Hospitalist Progress Note   Admit Date:  2022  3:17 PM   Name:  Paxton Zapata   Age:  65 y.o. Sex:  female  :  1960   MRN:  [id number]   Room:      Presenting Complaint: Nausea     Reason(s) for Admission: Dehydration [E86.0]  Hypokalemia [E87.6]  JULIETH (acute kidney injury) ([facility] [address] [N17.9]  Diarrhea, unspecified type [R19.7]  Acute renal failure superimposed on chronic kidney disease, unspecified CKD stage, unspecified acute renal failure type ([facility] [address] [N17.9, N18.9]     History of Present Illness:       Paxton Zapata is a 65 y.o. female with medical history of crohns disease, HTN, CKD3, depression, HLP, RLS, who is evaluated with complaints os several weeks of nausea, emesis and abdominal pain. She was seen for possible crohn's flare by GI last week. She was sent for CT chest/ AP but is not aware of the results. She did stool studies. Not improving at home with Pedialyte. She has ongoing metallic taste, anorexia, weight loss, dry skin, abdominal pain , decreased urination, diarrhea, nausea. Some dysuria. She has been mostly in the bed due to weakness, feels shaky. Has rectal spasms, burping and hiccups. Denies rectal bleeding. No fever. She is out of her [employer] for 5 days. She is followed by nephrology but not seen by 4-5 years. She thinks her renal function was about 40 %. Prior creatinine <2.0. today 3.80. potassium 2.8, sodium 127. HGB 10.0. FULL CODE     Lakesha Lassiter [phone]          Review of Systems:  10 systems reviewed and negative except as noted in HPI. - no vision change, no ear or throat pain, no chest pain no dyspnea or cough     2022  Patient seen and evaluated. Had increased abdominal pain and cramping 2022 started when transitioned to p.o. meds  Back on IV Solu-Medrol 2022   she does not personally feel like the [employer] is working  Her  is at bedside. .. states that the patient's father has donated millions of dollars to this hospital and he is not appreciative of the anticipated discharge date on the white board. ... He is frustrated over the patient's clinical condition for the last several years. .. States no one can give him answers. .. And we are talking about discharge. .. Afebrile since admission  Patient complains of persistent diarrhea that has not been witnessed by the nursing staff. Complains of significant abdominal pain and states she cannot take pills. Does not think morphine is effective.   She is not yet ready for discharge per GI   all questions answered      Assessment & Plan:     Principal Problem:    JULIETH (acute kidney injury) ([facility] [address]  CKD (chronic kidney disease) stage 3, GFR 30-59 ml/min ([facility] [address]  8/13/2022  Continue IV fluid hydration-we will decrease the rate of fluids to 100 mils per hour  Renal function currently stable and improving with hydration; continue to trend  continue to monitor electrolytes and correct as needed  Nephrology input appreciated    Active Problems:    Crohn's disease ([facility] [address]  With flare:  GI input is appreciated  Continue Solu-Medrol   Asked patient to have nursing see any output she has  Ask nursing to monitor output   had CTs last week  She is out of her xeljanz-does not feel this medication is working for her  C DIFF was negative   IV Dilaudid added  CT of the abdomen and pelvis with contrast ordered          Hyponatremia  8/13/2022  Resolving          Hypertension  8/13/2022  Continue to hold home meds for now  As needed blood pressure meds as needed        Hyperlipidemia  8/13/2022    Continue statin         Depression  8/13/2022     Continue Wellbutrin and zoloft             RLS (restless legs syndrome)  8/13/2022    Continue to hold Requip in the setting of JULIETH  Consider reinstating Requip tomorrow when CT contrast study has been if renal function is normal        Acute hypokalemia  8/13/2022    Currently resolved   continue to monitor and replace as needed   magnesium replaced August 10, 2022           Anemia  8/13/2022    HGB 10.0-->8.8--> 7.5  For IV iron today  Follow-up occult stool, iron panel, b12, folate         Thrush:  8/13/2022  Continue nystatin        Dispo/Discharge Planning:   TBD per GI    Diet: ADULT DIET; Regular  ADULT ORAL NUTRITION SUPPLEMENT; Breakfast, Lunch, Dinner; Clear Liquid Oral Supplement  VTE ppx: SCD  Code status: Full Code    Hospital Problems:  Principal Problem:    JULIETH (acute kidney injury) ([address]  Active Problems:    Crohn's disease ([address]    Hyponatremia    Hypertension    Hyperlipidemia    Depression    Moderate protein-calorie malnutrition (HCC)    Iron deficiency anemia due to chronic blood loss    RLS (restless legs syndrome)    CKD (chronic kidney disease) stage 3, GFR 30-59 ml/min (HCC)    Acute hypokalemia    Anemia    Essential hypertension, benign  Resolved Problems:    * No resolved hospital problems. *       Past History:     Past Medical History:   Diagnosis Date    Depression     managed with medication     Disc prolapse     L4 to L5    DJD (degenerative joint disease)     GERD (gastroesophageal reflux disease)     managed with medication     Hypertension     managed with medication     Obesity (BMI 30-39. 9)     BMI 31.5    Osteoarthritis     Steroid-induced diabetes mellitus ([address] 6/5/2013    Steroid-induced diabetes mellitus ([address] 6/5/2013    no present medication needed     Ulcerative colitis ([address]     managed with medication        Past Surgical History:   Procedure Laterality Date    COLONOSCOPY  2009    showed UC    FLEXIBLE SIGMOIDOSCOPY N/A 5/11/2016    SIGMOIDOSCOPY FLEXIBLE/   BMI 36 performed by Saundra Porter MD at [facility] 10    left mastoidectomy    HEENT  1971    tympanoplasty    INNER EAR SURGERY PROC UNLISTED  19,04,10    mastoid cyst +TM repair-hearing loss, left ear    TONSILLECTOMY  1971         VASCULAR SURGERY  06/5/2013 [facility]    port placement    VASCULAR SURGERY  2013    port placement for remicade        Social History     Tobacco Use    Smoking status: Every Day     Packs/day: 1.00     Types: Cigarettes    Smokeless tobacco: Never   Substance Use Topics    Alcohol use: No      Social History     Substance and Sexual Activity   Drug Use No       Family History   Problem Relation Age of Onset    Osteoarthritis Brother     Heart Disease Father     Hypertension Father     Breast Cancer Neg Hx     Diabetes Mother         type I            There is no immunization history on file for this patient.   Allergies   Allergen Reactions    Codeine Nausea Only     Prior to Admit Medications:  Current Outpatient Medications   Medication Instructions    acetaminophen (TYLENOL) 650 mg, Oral, EVERY 6 HOURS PRN    buPROPion (WELLBUTRIN XL) 150 mg, Oral, EVERY MORNING    dexlansoprazole (DEXILANT) 60 mg, Oral    irbesartan-hydroCHLOROthiazide (AVALIDE) 150-12.5 MG per tablet 1 tablet, Oral, EVERY OTHER DAY    LORazepam (ATIVAN) 1 mg, Oral    mesalamine (DELZICOL) 400 mg, 2 TIMES DAILY    pravastatin (PRAVACHOL) 80 mg, Oral    predniSONE 10 MG (21) TBPK SEE ADMIN INSTRUCTIONS    Probiotic Product (ACIDOPHILUS PROBIOTIC) CAPS capsule 1 tablet, Oral    rOPINIRole (REQUIP) 0.5 mg, Oral    sertraline (ZOLOFT) 100 mg, Oral    traMADol (ULTRAM) 50 mg, Oral, EVERY 8 HOURS PRN    valsartan-hydroCHLOROthiazide (DIOVAN-HCT) 160-25 MG per tablet 1 tablet, Oral         Objective:   Patient Vitals for the past 24 hrs:   Temp Pulse Resp BP SpO2   08/13/22 0720 98.6 °F (37 °C) 85 18 120/71 97 %   08/13/22 0633 -- -- 18 -- --   08/13/22 0342 98.2 °F (36.8 °C) 88 18 115/74 94 %   08/12/22 2326 98.1 °F (36.7 °C) 95 18 126/72 98 %   08/12/22 1930 99.1 °F (37.3 °C) 87 18 123/70 100 %   08/12/22 1748 -- -- 18 -- --   08/12/22 1718 -- -- 18 -- --   08/12/22 1539 98.7 °F (37.1 °C) 100 18 134/80 99 %   08/12/22 1113 98.2 °F (36.8 °C) 98 18 137/80 100 %   08/12/22 0932 -- -- 16 -- --   08/12/22 0902 -- -- 16 -- --         Oxygen Therapy  SpO2: 97 %  O2 Device: None (Room air)    Estimated body mass index is 23.82 kg/m² as calculated from the following:    Height as of this encounter: 5' 3\" (1.6 m). Weight as of this encounter: 134 lb 7.7 oz (61 kg). Intake/Output Summary (Last 24 hours) at 8/13/2022 0821  Last data filed at 8/13/2022 0604  Gross per 24 hour   Intake 1145 ml   Output 1800 ml   Net -655 ml           Physical Exam:    Blood pressure 120/71, pulse 85, temperature 98.6 °F (37 °C), temperature source Oral, resp. rate 18, height 5' 3\" (1.6 m), weight 134 lb 7.7 oz (61 kg), SpO2 97 %. General:    Elderly, no distress, alert , pleasant   Head:  Normocephalic, atraumatic  Eyes:  Sclerae appear normal.  Pupils equally round. ENT:  Nares appear normal, no drainage. Dry  oral mucosa with thrush   Neck:  No restricted ROM. Trachea midline   CV:   RRR. No m/r/g. No jugular venous distension. No edema   Lungs:   CTAB. No wheezing, rhonchi, or rales. Symmetric expansion. Abdomen: Bowel sounds present. Soft, tender RLQ, nondistended. Extremities: No cyanosis or clubbing. No edema  Skin:     No rashes and normal coloration. Warm and  very dry. Neuro:   grossly intact. Psych:  Normal mood and affect.       I have personally reviewed labs and tests showing:  Recent Labs:  Recent Results (from the past 24 hour(s))   POCT Glucose    Collection Time: 08/12/22 11:46 AM   Result Value Ref Range    POC Glucose 154 (H) 65 - 100 mg/dL    Performed by: GanesheMayBPCT    POCT Glucose    Collection Time: 08/12/22  5:11 PM   Result Value Ref Range    POC Glucose 134 (H) 65 - 100 mg/dL    Performed by: RkPCT    POCT Glucose    Collection Time: 08/12/22  8:50 PM   Result Value Ref Range    POC Glucose 100 65 - 100 mg/dL    Performed by: Mara    POCT Glucose    Collection Time: 08/13/22  3:32 AM   Result Value Ref Range    POC Glucose 124 (H) 65 - 100 mg/dL    Performed by: Roger    POCT Glucose    Collection Time: 08/13/22  7:52 AM   Result Value Ref Range    POC Glucose 91 65 - 100 mg/dL    Performed by: REAL Pinzon 38        I have personally reviewed imaging studies showing:  No results found. Echocardiogram:  No results found for this or any previous visit.       Meds previously ordered:  Orders Placed This Encounter   Medications    lactated ringers bolus    ondansetron (ZOFRAN) injection 4 mg    morphine injection 2 mg    potassium chloride 10 mEq/100 mL IVPB (Peripheral Line)    buPROPion (WELLBUTRIN SR) extended release tablet 150 mg    pantoprazole (PROTONIX) tablet 40 mg    pravastatin (PRAVACHOL) tablet 80 mg    sertraline (ZOLOFT) tablet 100 mg    sodium chloride flush 0.9 % injection 5-40 mL    sodium chloride flush 0.9 % injection 5-40 mL    0.9 % sodium chloride infusion    OR Linked Order Group     ondansetron (ZOFRAN-ODT) disintegrating tablet 4 mg     ondansetron (ZOFRAN) injection 4 mg    polyethylene glycol (GLYCOLAX) packet 17 g    OR Linked Order Group     acetaminophen (TYLENOL) tablet 650 mg     acetaminophen (TYLENOL) suppository 650 mg    0.9 % sodium chloride infusion    nystatin (MYCOSTATIN) 403444 UNIT/ML suspension 500,000 Units    morphine injection 2 mg    rOPINIRole (REQUIP) tablet 0.5 mg    DISCONTD: methylPREDNISolone sodium (SOLU-MEDROL) injection 40 mg    hyoscyamine (LEVSIN/SL) sublingual tablet 125 mcg    diphenoxylate-atropine (LOMOTIL) 2.5-0.025 MG per tablet 1 tablet    magnesium sulfate 4000 mg in 100 mL IVPB premix    potassium chloride 10 mEq/100 mL IVPB (Peripheral Line)    potassium chloride 10 mEq/100 mL IVPB (Peripheral Line)    potassium chloride (KLOR-CON M) extended release tablet 20 mEq    DISCONTD: iron sucrose (VENOFER) 300 mg in sodium chloride 0.9 % 250 mL IVPB    insulin lispro (HUMALOG) injection vial 0-4 Units    insulin lispro (HUMALOG) injection vial 0-4 Units    glucose chewable tablet 16 g    OR Linked Order Group     dextrose bolus 10% 125 mL     dextrose bolus 10% 250 mL    glucagon (rDNA) injection 1 mg    dextrose 10 % infusion    ferric gluconate (FERRLECIT) 125 mg in sodium chloride 0.9 % 100 mL IVPB    DISCONTD: cefTRIAXone (ROCEPHIN) 1,000 mg in sodium chloride 0.9 % 50 mL IVPB mini-bag     Order Specific Question:   Antimicrobial Indications     Answer:   Urinary Tract Infection     Order Specific Question:   UTI duration of therapy     Answer:   3 days    DISCONTD: predniSONE (DELTASONE) tablet 30 mg    OR Linked Order Group     traMADol (ULTRAM) tablet 50 mg     traMADol (ULTRAM) tablet 100 mg    methylPREDNISolone sodium (SOLU-MEDROL) injection 40 mg    DISCONTD: iron sucrose (VENOFER) 200 mg in sodium chloride 0.9 % 100 mL IVPB     Please substitute with whatever iron is available    ferrous sulfate (IRON 325) tablet 325 mg    ferric gluconate (FERRLECIT) 125 mg in sodium chloride 0.9 % 100 mL IVPB    DISCONTD: melatonin tablet 10 mg    melatonin tablet 9 mg           Signed:  Yuli Pepper MD    Part of this note may have been written by using a voice dictation software. The note has been proof read but may still contain some grammatical/other typographical errors.

## 2022-08-13 NOTE — PROGRESS NOTES
Maegan Ridgefield  Admission Date: 8/8/2022         [address] Nephrology Progress Note: 8/13/2022    Follow-up for: JULIETH/CKD stage 3b    The patient's chart is reviewed and the patient is discussed with the staff. Subjective:     Pt seen and examined in room. Pt feeling better, no complaints.  Pt reports her diarrhea has significantly improved sine starting lomotil    ROS:  No CP, no sob, no edema, no n/v    Current Facility-Administered Medications   Medication Dose Route Frequency    ciprofloxacin (CIPRO) tablet 250 mg  250 mg Oral 2 times per day    metroNIDAZOLE (FLAGYL) tablet 250 mg  250 mg Oral 3 times per day    methylPREDNISolone sodium (SOLU-MEDROL) injection 40 mg  40 mg IntraVENous Q12H    ferrous sulfate (IRON 325) tablet 325 mg  325 mg Oral BID WC    melatonin tablet 9 mg  9 mg Oral Nightly    traMADol (ULTRAM) tablet 50 mg  50 mg Oral Q6H PRN    Or    traMADol (ULTRAM) tablet 100 mg  100 mg Oral Q6H PRN    potassium chloride (KLOR-CON M) extended release tablet 20 mEq  20 mEq Oral BID WC    insulin lispro (HUMALOG) injection vial 0-4 Units  0-4 Units SubCUTAneous TID WC    insulin lispro (HUMALOG) injection vial 0-4 Units  0-4 Units SubCUTAneous Nightly    glucose chewable tablet 16 g  4 tablet Oral PRN    dextrose bolus 10% 125 mL  125 mL IntraVENous PRN    Or    dextrose bolus 10% 250 mL  250 mL IntraVENous PRN    glucagon (rDNA) injection 1 mg  1 mg SubCUTAneous PRN    dextrose 10 % infusion   IntraVENous Continuous PRN    hyoscyamine (LEVSIN/SL) sublingual tablet 125 mcg  125 mcg SubLINGual TID    diphenoxylate-atropine (LOMOTIL) 2.5-0.025 MG per tablet 1 tablet  1 tablet Oral 4x Daily    bu[facility]ion [facility]) extended release tablet 150 mg  150 mg Oral QAM    pantoprazole (PROTONIX) tablet 40 mg  40 mg Oral QAM AC    pravastatin (PRAVACHOL) tablet 80 mg  80 mg Oral Nightly    sertraline (ZOLOFT) tablet 100 mg  100 mg Oral Daily    sodium chloride flush 0.9 % injection 5-40 mL  5-40 mL IntraVENous 2 times per day    sodium chloride flush 0.9 % injection 5-40 mL  5-40 mL IntraVENous PRN    0.9 % sodium chloride infusion   IntraVENous PRN    ondansetron (ZOFRAN-ODT) disintegrating tablet 4 mg  4 mg Oral Q8H PRN    Or    ondansetron (ZOFRAN) injection 4 mg  4 mg IntraVENous Q6H PRN    polyethylene glycol (GLYCOLAX) packet 17 g  17 g Oral Daily PRN    acetaminophen (TYLENOL) tablet 650 mg  650 mg Oral Q6H PRN    Or    acetaminophen (TYLENOL) suppository 650 mg  650 mg Rectal Q6H PRN    0.9 % sodium chloride infusion   IntraVENous Continuous    nystatin (MYCOSTATIN) 957233 UNIT/ML suspension 500,000 Units  5 mL Oral 4x Daily    morphine injection 2 mg  2 mg IntraVENous Q4H PRN    rOPINIRole (REQUIP) tablet 0.5 mg  0.5 mg Oral Nightly         Objective:     Vitals:    08/13/22 0342 08/13/22 0633 08/13/22 0720 08/13/22 1113   BP: 115/74  120/71 119/71   Pulse: 88  85 91   Resp: 18 18 18 18   Temp: 98.2 °F (36.8 °C)  98.6 °F (37 °C) 98.6 °F (37 °C)   TempSrc: Oral  Oral Oral   SpO2: 94%  97% 96%   Weight:       Height:         Intake and Output:   08/11 1901 - 08/13 0700  In: 2907.5 [P.O.:100; I.V.:2697.5]  Out: 2850 [Urine:2850]  No intake/output data recorded. Physical Exam:   Constitutional:  the patient is well developed and in no acute distress  HEENT:  Sclera clear, pupils equal, oral mucosa moist  Lungs: clear bilaterally  Cardiovascular:  RRR no rub  Abd/GI: soft and non-tender; with positive bowel sounds. Ext: warm without cyanosis. There is no lower leg edema.   Musculoskeletal: moves all four extremities with equal strength  Skin:  no jaundice or rashes, no wounds   Neuro: no gross neuro deficits         LAB  Recent Labs     08/11/22  0453 08/12/22  0332   WBC 5.0 4.3   HGB 7.7* 7.5*   HCT 23.0* 22.4*    147*       Recent Labs     08/11/22  0453 08/12/22  0332    135*   K 3.6 3.8   * 109*   CO2 23 22   BUN 21 15   CREATININE 1.30* 1.10*       No results for input(s): PH, PCO2, PO2, HCO3 in the last 72 hours. Plan:  (Medical Decision Making)   1. JULIETH/CKD 3b likely pre renal azotemia 2/2 GI looses, N/V/D  -baseline Cr 1.6-1.9.  -Renal ultrasound with mild hydronephrosis - upon review today pt has had some issues with urination at of late. Spoke with Belkys Mason NP with urology, will schedule outpatient follow-up  -P/C ratio 0.47  -renal function significantly improved today, appears back to patient's baseline,      2. Hypokalemia- resolved     3. Hyponatremia- improving with NS, l    4. Nausea/vomiting/diarrhea/abdominal pain, hx of ulcerative colitis, Crohn's flare, GI following on On solumedrol, PPI, levsin and lomotil     5.  Anemia- iron deficient, primary team repleting    Norman Orellana MD  [facility]

## 2022-08-13 NOTE — PROGRESS NOTES
Patient resting in bed on room air. A&Ox4. Respirations even and unlabored. Patient denies pain and is in no acute distress at this time. Pt states relief with dilaudid. NS infusing. No needs expressed. Call light within reach, will continue to monitor.

## 2022-08-14 LAB
ANION GAP SERPL CALC-SCNC: 6 MMOL/L (ref 7–16)
BASOPHILS # BLD: 0 K/UL (ref 0–0.2)
BASOPHILS NFR BLD: 0 % (ref 0–2)
BUN SERPL-MCNC: 11 MG/DL (ref 8–23)
CALCIUM SERPL-MCNC: 7 MG/DL (ref 8.3–10.4)
CHLORIDE SERPL-SCNC: 105 MMOL/L (ref 98–107)
CO2 SERPL-SCNC: 21 MMOL/L (ref 21–32)
CREAT SERPL-MCNC: 1 MG/DL (ref 0.6–1)
DIFFERENTIAL METHOD BLD: ABNORMAL
EOSINOPHIL # BLD: 0 K/UL (ref 0–0.8)
EOSINOPHIL NFR BLD: 0 % (ref 0.5–7.8)
ERYTHROCYTE [DISTWIDTH] IN BLOOD BY AUTOMATED COUNT: 13.5 % (ref 11.9–14.6)
GLUCOSE BLD STRIP.AUTO-MCNC: 101 MG/DL (ref 65–100)
GLUCOSE BLD STRIP.AUTO-MCNC: 102 MG/DL (ref 65–100)
GLUCOSE BLD STRIP.AUTO-MCNC: 146 MG/DL (ref 65–100)
GLUCOSE BLD STRIP.AUTO-MCNC: 166 MG/DL (ref 65–100)
GLUCOSE SERPL-MCNC: 100 MG/DL (ref 65–100)
HCT VFR BLD AUTO: 21.5 % (ref 35.8–46.3)
HGB BLD-MCNC: 7.2 G/DL (ref 11.7–15.4)
IMM GRANULOCYTES # BLD AUTO: 0.1 K/UL (ref 0–0.5)
IMM GRANULOCYTES NFR BLD AUTO: 2 % (ref 0–5)
LYMPHOCYTES # BLD: 0.3 K/UL (ref 0.5–4.6)
LYMPHOCYTES NFR BLD: 6 % (ref 13–44)
MAGNESIUM SERPL-MCNC: 0.9 MG/DL (ref 1.8–2.4)
MCH RBC QN AUTO: 30.5 PG (ref 26.1–32.9)
MCHC RBC AUTO-ENTMCNC: 33.5 G/DL (ref 31.4–35)
MCV RBC AUTO: 91.1 FL (ref 79.6–97.8)
MONOCYTES # BLD: 0.3 K/UL (ref 0.1–1.3)
MONOCYTES NFR BLD: 6 % (ref 4–12)
NEUTS SEG # BLD: 3.8 K/UL (ref 1.7–8.2)
NEUTS SEG NFR BLD: 86 % (ref 43–78)
NRBC # BLD: 0 K/UL (ref 0–0.2)
PHOSPHATE SERPL-MCNC: 1.6 MG/DL (ref 2.3–3.7)
PLATELET # BLD AUTO: 124 K/UL (ref 150–450)
PMV BLD AUTO: 7.7 FL (ref 9.4–12.3)
POTASSIUM SERPL-SCNC: 3.6 MMOL/L (ref 3.5–5.1)
RBC # BLD AUTO: 2.36 M/UL (ref 4.05–5.2)
SERVICE CMNT-IMP: ABNORMAL
SODIUM SERPL-SCNC: 132 MMOL/L (ref 136–145)
TRIGL SERPL-MCNC: 54 MG/DL (ref 35–150)
WBC # BLD AUTO: 4.4 K/UL (ref 4.3–11.1)

## 2022-08-14 PROCEDURE — 6360000002 HC RX W HCPCS: Performed by: INTERNAL MEDICINE

## 2022-08-14 PROCEDURE — 85025 COMPLETE CBC W/AUTO DIFF WBC: CPT

## 2022-08-14 PROCEDURE — 6370000000 HC RX 637 (ALT 250 FOR IP): Performed by: INTERNAL MEDICINE

## 2022-08-14 PROCEDURE — 36415 COLL VENOUS BLD VENIPUNCTURE: CPT

## 2022-08-14 PROCEDURE — 6370000000 HC RX 637 (ALT 250 FOR IP): Performed by: HOSPITALIST

## 2022-08-14 PROCEDURE — 2580000003 HC RX 258: Performed by: INTERNAL MEDICINE

## 2022-08-14 PROCEDURE — 94760 N-INVAS EAR/PLS OXIMETRY 1: CPT

## 2022-08-14 PROCEDURE — 83735 ASSAY OF MAGNESIUM: CPT

## 2022-08-14 PROCEDURE — 80048 BASIC METABOLIC PNL TOTAL CA: CPT

## 2022-08-14 PROCEDURE — 82962 GLUCOSE BLOOD TEST: CPT

## 2022-08-14 PROCEDURE — 84100 ASSAY OF PHOSPHORUS: CPT

## 2022-08-14 PROCEDURE — 6370000000 HC RX 637 (ALT 250 FOR IP): Performed by: PHYSICIAN ASSISTANT

## 2022-08-14 PROCEDURE — 1100000000 HC RM PRIVATE

## 2022-08-14 PROCEDURE — 84478 ASSAY OF TRIGLYCERIDES: CPT

## 2022-08-14 PROCEDURE — 2500000003 HC RX 250 WO HCPCS: Performed by: INTERNAL MEDICINE

## 2022-08-14 RX ORDER — MAGNESIUM SULFATE IN WATER 40 MG/ML
4000 INJECTION, SOLUTION INTRAVENOUS ONCE
Status: DISCONTINUED | OUTPATIENT
Start: 2022-08-14 | End: 2022-08-14

## 2022-08-14 RX ORDER — MAGNESIUM SULFATE IN WATER 40 MG/ML
2000 INJECTION, SOLUTION INTRAVENOUS PRN
Status: DISCONTINUED | OUTPATIENT
Start: 2022-08-14 | End: 2022-08-24 | Stop reason: HOSPADM

## 2022-08-14 RX ORDER — POTASSIUM CHLORIDE 29.8 MG/ML
20 INJECTION INTRAVENOUS PRN
Status: DISCONTINUED | OUTPATIENT
Start: 2022-08-14 | End: 2022-08-24 | Stop reason: HOSPADM

## 2022-08-14 RX ORDER — THIAMINE HYDROCHLORIDE 100 MG/ML
100 INJECTION, SOLUTION INTRAMUSCULAR; INTRAVENOUS DAILY
Status: COMPLETED | OUTPATIENT
Start: 2022-08-14 | End: 2022-08-20

## 2022-08-14 RX ORDER — POTASSIUM CHLORIDE 7.45 MG/ML
10 INJECTION INTRAVENOUS PRN
Status: DISCONTINUED | OUTPATIENT
Start: 2022-08-14 | End: 2022-08-24 | Stop reason: HOSPADM

## 2022-08-14 RX ADMIN — FERROUS SULFATE TAB 325 MG (65 MG ELEMENTAL FE) 325 MG: 325 (65 FE) TAB at 08:46

## 2022-08-14 RX ADMIN — MAGNESIUM SULFATE HEPTAHYDRATE 2000 MG: 40 INJECTION, SOLUTION INTRAVENOUS at 15:19

## 2022-08-14 RX ADMIN — HYDROMORPHONE HYDROCHLORIDE 1 MG: 1 INJECTION, SOLUTION INTRAMUSCULAR; INTRAVENOUS; SUBCUTANEOUS at 13:35

## 2022-08-14 RX ADMIN — SODIUM PHOSPHATE, MONOBASIC, MONOHYDRATE 15 MMOL: 276; 142 INJECTION, SOLUTION INTRAVENOUS at 17:51

## 2022-08-14 RX ADMIN — POTASSIUM PHOSPHATE, MONOBASIC POTASSIUM PHOSPHATE, DIBASIC: 224; 236 INJECTION, SOLUTION, CONCENTRATE INTRAVENOUS at 17:30

## 2022-08-14 RX ADMIN — DIPHENOXYLATE HYDROCHLORIDE AND ATROPINE SULFATE 1 TABLET: 2.5; .025 TABLET ORAL at 13:36

## 2022-08-14 RX ADMIN — HYDROMORPHONE HYDROCHLORIDE 1 MG: 1 INJECTION, SOLUTION INTRAMUSCULAR; INTRAVENOUS; SUBCUTANEOUS at 00:05

## 2022-08-14 RX ADMIN — HYDROMORPHONE HYDROCHLORIDE 1 MG: 1 INJECTION, SOLUTION INTRAMUSCULAR; INTRAVENOUS; SUBCUTANEOUS at 06:34

## 2022-08-14 RX ADMIN — NYSTATIN 500000 UNITS: 100000 SUSPENSION ORAL at 20:58

## 2022-08-14 RX ADMIN — PANTOPRAZOLE SODIUM 40 MG: 40 TABLET, DELAYED RELEASE ORAL at 06:28

## 2022-08-14 RX ADMIN — CIPROFLOXACIN 250 MG: 500 TABLET, FILM COATED ORAL at 20:58

## 2022-08-14 RX ADMIN — SODIUM CHLORIDE, PRESERVATIVE FREE 10 ML: 5 INJECTION INTRAVENOUS at 20:58

## 2022-08-14 RX ADMIN — CIPROFLOXACIN 250 MG: 500 TABLET, FILM COATED ORAL at 08:46

## 2022-08-14 RX ADMIN — POTASSIUM CHLORIDE 20 MEQ: 20 TABLET, EXTENDED RELEASE ORAL at 08:46

## 2022-08-14 RX ADMIN — METRONIDAZOLE 250 MG: 500 TABLET ORAL at 13:36

## 2022-08-14 RX ADMIN — NYSTATIN 500000 UNITS: 100000 SUSPENSION ORAL at 08:46

## 2022-08-14 RX ADMIN — NYSTATIN 500000 UNITS: 100000 SUSPENSION ORAL at 13:35

## 2022-08-14 RX ADMIN — SODIUM CHLORIDE, PRESERVATIVE FREE 10 ML: 5 INJECTION INTRAVENOUS at 08:46

## 2022-08-14 RX ADMIN — NYSTATIN 500000 UNITS: 100000 SUSPENSION ORAL at 17:06

## 2022-08-14 RX ADMIN — THIAMINE HYDROCHLORIDE 100 MG: 100 INJECTION, SOLUTION INTRAMUSCULAR; INTRAVENOUS at 13:35

## 2022-08-14 RX ADMIN — POTASSIUM CHLORIDE 20 MEQ: 20 TABLET, EXTENDED RELEASE ORAL at 17:06

## 2022-08-14 RX ADMIN — HYOSCYAMINE SULFATE 125 MCG: 0.12 TABLET ORAL at 20:58

## 2022-08-14 RX ADMIN — HYDROMORPHONE HYDROCHLORIDE 1 MG: 1 INJECTION, SOLUTION INTRAMUSCULAR; INTRAVENOUS; SUBCUTANEOUS at 21:10

## 2022-08-14 RX ADMIN — SOYBEAN OIL 250 ML: 20 INJECTION, SOLUTION INTRAVENOUS at 17:30

## 2022-08-14 RX ADMIN — METHYLPREDNISOLONE SODIUM SUCCINATE 40 MG: 125 INJECTION, POWDER, FOR SOLUTION INTRAMUSCULAR; INTRAVENOUS at 20:57

## 2022-08-14 RX ADMIN — DIPHENOXYLATE HYDROCHLORIDE AND ATROPINE SULFATE 1 TABLET: 2.5; .025 TABLET ORAL at 08:46

## 2022-08-14 RX ADMIN — HYOSCYAMINE SULFATE 125 MCG: 0.12 TABLET ORAL at 13:36

## 2022-08-14 RX ADMIN — PRAVASTATIN SODIUM 80 MG: 80 TABLET ORAL at 20:58

## 2022-08-14 RX ADMIN — DIPHENOXYLATE HYDROCHLORIDE AND ATROPINE SULFATE 1 TABLET: 2.5; .025 TABLET ORAL at 17:05

## 2022-08-14 RX ADMIN — METHYLPREDNISOLONE SODIUM SUCCINATE 40 MG: 125 INJECTION, POWDER, FOR SOLUTION INTRAMUSCULAR; INTRAVENOUS at 08:46

## 2022-08-14 RX ADMIN — METRONIDAZOLE 250 MG: 500 TABLET ORAL at 20:58

## 2022-08-14 RX ADMIN — ROPINIROLE HYDROCHLORIDE 0.5 MG: 0.5 TABLET, FILM COATED ORAL at 20:57

## 2022-08-14 RX ADMIN — ALTEPLASE 1 MG: 2.2 INJECTION, POWDER, LYOPHILIZED, FOR SOLUTION INTRAVENOUS at 15:47

## 2022-08-14 RX ADMIN — HYOSCYAMINE SULFATE 125 MCG: 0.12 TABLET ORAL at 08:46

## 2022-08-14 RX ADMIN — DIPHENOXYLATE HYDROCHLORIDE AND ATROPINE SULFATE 1 TABLET: 2.5; .025 TABLET ORAL at 20:58

## 2022-08-14 RX ADMIN — BUPROPION HYDROCHLORIDE 150 MG: 150 TABLET, EXTENDED RELEASE ORAL at 08:45

## 2022-08-14 RX ADMIN — MAGNESIUM SULFATE HEPTAHYDRATE 2000 MG: 40 INJECTION, SOLUTION INTRAVENOUS at 13:36

## 2022-08-14 RX ADMIN — SODIUM CHLORIDE: 9 INJECTION, SOLUTION INTRAVENOUS at 04:57

## 2022-08-14 RX ADMIN — FERROUS SULFATE TAB 325 MG (65 MG ELEMENTAL FE) 325 MG: 325 (65 FE) TAB at 17:06

## 2022-08-14 RX ADMIN — SERTRALINE HYDROCHLORIDE 100 MG: 25 TABLET ORAL at 08:45

## 2022-08-14 RX ADMIN — Medication 9 MG: at 20:57

## 2022-08-14 RX ADMIN — METRONIDAZOLE 250 MG: 500 TABLET ORAL at 06:28

## 2022-08-14 ASSESSMENT — PAIN DESCRIPTION - ORIENTATION
ORIENTATION: LOWER

## 2022-08-14 ASSESSMENT — PAIN DESCRIPTION - DESCRIPTORS
DESCRIPTORS: ACHING;SHARP
DESCRIPTORS: ACHING;SHARP
DESCRIPTORS: ACHING

## 2022-08-14 ASSESSMENT — PAIN DESCRIPTION - LOCATION
LOCATION: ABDOMEN

## 2022-08-14 ASSESSMENT — PAIN SCALES - GENERAL
PAINLEVEL_OUTOF10: 0
PAINLEVEL_OUTOF10: 7
PAINLEVEL_OUTOF10: 8
PAINLEVEL_OUTOF10: 0
PAINLEVEL_OUTOF10: 9
PAINLEVEL_OUTOF10: 0

## 2022-08-14 NOTE — PROGRESS NOTES
Paxton Zapata  Admission Date: 8/8/2022         [facility] Nephrology Progress Note: 8/14/2022    Follow-up for: JULIETH/CKD stage 3b    The patient's chart is reviewed and the patient is discussed with the staff. Subjective:     Pt seen and examined in room. Pt feeling better, no complaints.  Pt reports her diarrhea has significantly improved sine starting lomotil    ROS:  No CP, no sob, no edema, no n/v    Current Facility-Administered Medications   Medication Dose Route Frequency    ciprofloxacin (CIPRO) tablet 250 mg  250 mg Oral 2 times per day    metroNIDAZOLE (FLAGYL) tablet 250 mg  250 mg Oral 3 times per day    HYDROmorphone HCl PF (DILAUDID) injection 1 mg  1 mg IntraVENous Q4H PRN    diatrizoate meglumine-sodium (GASTROGRAFIN) 66-10 % solution 15 mL  15 mL Oral ONCE PRN    methylPREDNISolone sodium (SOLU-MEDROL) injection 40 mg  40 mg IntraVENous Q12H    ferrous sulfate (IRON 325) tablet 325 mg  325 mg Oral BID WC    melatonin tablet 9 mg  9 mg Oral Nightly    traMADol (ULTRAM) tablet 50 mg  50 mg Oral Q6H PRN    Or    traMADol (ULTRAM) tablet 100 mg  100 mg Oral Q6H PRN    potassium chloride (KLOR-CON M) extended release tablet 20 mEq  20 mEq Oral BID WC    insulin lispro (HUMALOG) injection vial 0-4 Units  0-4 Units SubCUTAneous TID WC    insulin lispro (HUMALOG) injection vial 0-4 Units  0-4 Units SubCUTAneous Nightly    glucose chewable tablet 16 g  4 tablet Oral PRN    dextrose bolus 10% 125 mL  125 mL IntraVENous PRN    Or    dextrose bolus 10% 250 mL  250 mL IntraVENous PRN    glucagon (rDNA) injection 1 mg  1 mg SubCUTAneous PRN    dextrose 10 % infusion   IntraVENous Continuous PRN    hyoscyamine (LEVSIN/SL) sublingual tablet 125 mcg  125 mcg SubLINGual TID    diphenoxylate-atropine (LOMOTIL) 2.5-0.025 MG per tablet 1 tablet  1 tablet Oral 4x Daily    buPROPion [facility]) extended release tablet 150 mg  150 mg Oral QAM    pantoprazole (PROTONIX) tablet 40 mg  40 mg Oral QAM AC    pravastatin (PRAVACHOL) tablet 80 mg  80 mg Oral Nightly    sertraline (ZOLOFT) tablet 100 mg  100 mg Oral Daily    sodium chloride flush 0.9 % injection 5-40 mL  5-40 mL IntraVENous 2 times per day    sodium chloride flush 0.9 % injection 5-40 mL  5-40 mL IntraVENous PRN    0.9 % sodium chloride infusion   IntraVENous PRN    ondansetron (ZOFRAN-ODT) disintegrating tablet 4 mg  4 mg Oral Q8H PRN    Or    ondansetron (ZOFRAN) injection 4 mg  4 mg IntraVENous Q6H PRN    polyethylene glycol (GLYCOLAX) packet 17 g  17 g Oral Daily PRN    acetaminophen (TYLENOL) tablet 650 mg  650 mg Oral Q6H PRN    Or    acetaminophen (TYLENOL) suppository 650 mg  650 mg Rectal Q6H PRN    0.9 % sodium chloride infusion   IntraVENous Continuous    nystatin (MYCOSTATIN) 166120 UNIT/ML suspension 500,000 Units  5 mL Oral 4x Daily    morphine injection 2 mg  2 mg IntraVENous Q4H PRN    rOPINIRole (REQUIP) tablet 0.5 mg  0.5 mg Oral Nightly         Objective:     Vitals:    08/14/22 0035 08/14/22 0331 08/14/22 0634 08/14/22 0711   BP:  128/83  119/75   Pulse:  (!) 102  92   Resp: 16 16 18 19   Temp:  97.9 °F (36.6 °C)  97.7 °F (36.5 °C)   TempSrc:    Oral   SpO2:  97%  95%   Weight:       Height:         Intake and Output:   08/12 1901 - 08/14 0700  In: 0   Out: 1200 [Urine:1200]  No intake/output data recorded. Physical Exam:   Constitutional:  the patient is well developed and in no acute distress  HEENT:  Sclera clear, pupils equal, oral mucosa moist  Lungs: clear bilaterally  Cardiovascular:  RRR no rub  Abd/GI: soft and non-tender; with positive bowel sounds. Ext: warm without cyanosis. There is no lower leg edema.   Musculoskeletal: moves all four extremities with equal strength  Skin:  no jaundice or rashes, no wounds   Neuro: no gross neuro deficits         LAB  Recent Labs     08/12/22  0332 08/13/22  1305 08/14/22  0302   WBC 4.3 4.5 4.4   HGB 7.5* 7.6* 7.2*   HCT 22.4* 22.8* 21.5*   * 138* 124*       Recent Labs

## 2022-08-14 NOTE — PROGRESS NOTES
Hospitalist Progress Note   Admit Date:  2022  3:17 PM   Name:  Polina Payne   Age:  65 y.o. Sex:  female  :  1960   MRN:  [id number]   Room:  80/    Presenting Complaint: Nausea     Reason(s) for Admission: Dehydration [E86.0]  Hypokalemia [E87.6]  JULIETH (acute kidney injury) ([facility] [address] [N17.9]  Diarrhea, unspecified type [R19.7]  Acute renal failure superimposed on chronic kidney disease, unspecified CKD stage, unspecified acute renal failure type ([facility] [address] [N17.9, N18.9]     History of Present Illness:       Polina Payne is a 65 y.o. female with medical history of crohns disease, HTN, CKD3, depression, HLP, RLS, who is evaluated with complaints os several weeks of nausea, emesis and abdominal pain. She was seen for possible crohn's flare by GI last week. She was sent for CT chest/ AP but is not aware of the results. She did stool studies. Not improving at home with Pedialyte. She has ongoing metallic taste, anorexia, weight loss, dry skin, abdominal pain , decreased urination, diarrhea, nausea. Some dysuria. She has been mostly in the bed due to weakness, feels shaky. Has rectal spasms, burping and hiccups. Denies rectal bleeding. No fever. She is out of her Miles Ra for 5 days. She is followed by nephrology but not seen by 4-5 years. She thinks her renal function was about 40 %. Prior creatinine <2.0. today 3.80. potassium 2.8, sodium 127. HGB 10.0. FULL CODE     Precious Corea [phone]        As previously documented:    Mrs. Nabeel Escobedo Had increased abdominal pain and cramping 2022 which started when transitioned to p.o. meds  Back on IV Solu-Medrol 2022   She does not personally feel like the Pinkie Proper is working      2022-  Her  states that the patient's father has donated millions of dollars to this hospital and he is not appreciative of the anticipated discharge date on the white board. ...   He is frustrated over the patient's clinical condition for the last several years. .. States no one can give him answers. .. And we are talking about discharge. .. 8/14/2022  Patient seen and evaluated. Afebrile since admission  Patient continues to complain of multiple bouts diarrhea that has not been witnessed by the nursing staff. She had 4 episodes of stool documented yesterday. Per nursing staff initial diarrhea that she had on admission slowed markedly after starting Lomotil. She has 2 dark green loose/ diaarheal bowel movements that I see in the bathroom and the bedside commode-they are not copious approximately 10 mL & 20 mL respectively and also contains a sig amount of urine. Despite being informed to let nursing know every time she gets up to use the bathroom she is not doing so. Complains of significant abdominal pain but it has improved since starting Dilaudid. She states that she cannot survive on pills so she does not know how she will make it outside of the  hospital.  CT abdomen and pelvis done yesterday showed bowel wall thickening query secondary to decreased distention versus colitis. She is not yet ready for discharge per GI   all questions answered      Assessment & Plan:     Principal Problem:    JULIETH (acute kidney injury) ([address]  CKD (chronic kidney disease) stage 3, GFR 30-59 ml/min ([address]  8/14/2022  Continue IV fluid hydration-we will decrease the rate of fluids to 75 ml per hour  Renal function currently stable and improving with hydration; continue to trend  continue to monitor electrolytes and correct as needed  Nephrology input appreciated    Active Problems:    Crohn's disease ([address]  With flare:  GI input is appreciated-discussed with them and encouraged to start her on TPN given the points of poor p.o. intake  continue Solu-Medrol   Asked patient to have nursing see any output she has-she is not very compliant and complains of multiple bowel movements that she does not call nursing services for.   Ask nursing to continue to monitor her output as they are able to. CM  CT findings as above  She is out of her xeljanz-does not feel this medication is working for her  C DIFF was negative   Continue IV Dilaudid, continue Lopressor, continue Protonix          Hyponatremia  8/14/2022  Stable        Hypertension  8/14/2022  Continue to hold home meds for now  As needed blood pressure meds as needed        Hyperlipidemia  8/14/2022    Continue statin         Depression  8/14/2022     Continue Wellbutrin and zoloft             RLS (restless legs syndrome)  8/14/2022    Requip is on hold in the setting of JULIETH  Renal function is stable status post CT abdomen pelvis so we will reinstate her Requip        Acute hypokalemia  8/14/2022    Currently resolved   continue to monitor and replace as needed   magnesium replaced August 10, 2022 a  We will send a repeat mag today        Anemia  8/14/2022    HGB 10.0-->8.8--> 7.5-->7. 2  Appears dilutional with hydration  No active bleeding seen        Thrush:  8/14/2022  Continue nystatin        Dispo/Discharge Planning:   TBD per GI;    Patient is high risk with IV narcotics on board  Also very difficult clinical situation as she is claiming bowel movements. That are out proportion to the bowel movements actually witnessed by staff    Diet: ADULT DIET; Regular  ADULT ORAL NUTRITION SUPPLEMENT; Breakfast, Lunch, Dinner; Clear Liquid Oral Supplement  VTE ppx: SCD  Code status: Full Code    Hospital Problems:  Principal Problem:    JULIETH (acute kidney injury) ([facility] [address]  Active Problems:    Crohn's disease ([facility] [address]    Hyponatremia    Hypertension    Hyperlipidemia    Depression    Moderate protein-calorie malnutrition (HCC)    Iron deficiency anemia due to chronic blood loss    RLS (restless legs syndrome)    CKD (chronic kidney disease) stage 3, GFR 30-59 ml/min (HCC)    Acute hypokalemia    Colitis    Anemia    Essential hypertension, benign  Resolved Problems:    * No resolved hospital problems.  * Past History:     Past Medical History:   Diagnosis Date    Depression     managed with medication     Disc prolapse     L4 to L5    DJD (degenerative joint disease)     GERD (gastroesophageal reflux disease)     managed with medication     Hypertension     managed with medication     Obesity (BMI 30-39. 9)     BMI 31.5    Osteoarthritis     Steroid-induced diabetes mellitus ([address] 6/5/2013    Steroid-induced diabetes mellitus ([address] 6/5/2013    no present medication needed     Ulcerative colitis ([address]     managed with medication        Past Surgical History:   Procedure Laterality Date    COLONOSCOPY  2009    showed UC    FLEXIBLE SIGMOIDOSCOPY N/A 5/11/2016    SIGMOIDOSCOPY FLEXIBLE/   BMI 36 performed by Claudeen Righter, MD at [place] [address]    left mastoidectomy    HEENT  1971    tympanoplasty    INNER EAR SURGERY PROC UNLISTED  77,17,93    mastoid cyst +TM repair-hearing loss, left ear    TONSILLECTOMY  1971         VASCULAR SURGERY  06/5/2013 [place]    port placement    VASCULAR SURGERY  2013    port placement for remicade        Social History     Tobacco Use    Smoking status: Every Day     Packs/day: 1.00     Types: Cigarettes    Smokeless tobacco: Never   Substance Use Topics    Alcohol use: No      Social History     Substance and Sexual Activity   Drug Use No       Family History   Problem Relation Age of Onset    Osteoarthritis Brother     Heart Disease Father     Hypertension Father     Breast Cancer Neg Hx     Diabetes Mother         type I            There is no immunization history on file for this patient.   Allergies   Allergen Reactions    Codeine Nausea Only     Prior to Admit Medications:  Current Outpatient Medications   Medication Instructions    acetaminophen (TYLENOL) 650 mg, Oral, EVERY 6 HOURS PRN    buPROPion (WELLBUTRIN XL) 150 mg, Oral, EVERY MORNING    dexlansoprazole (DEXILANT) 60 mg, Oral    irbesartan-hydroCHLOROthiazide (AVALIDE) 150-12.5 MG per tablet 1 tablet, Oral, EVERY OTHER DAY    LORazepam (ATIVAN) 1 mg, Oral    mesalamine (DELZICOL) 400 mg, 2 TIMES DAILY    pravastatin (PRAVACHOL) 80 mg, Oral    predniSONE 10 MG (21) TBPK SEE ADMIN INSTRUCTIONS    Probiotic Product (ACIDOPHILUS PROBIOTIC) CAPS capsule 1 tablet, Oral    rOPINIRole (REQUIP) 0.5 mg, Oral    sertraline (ZOLOFT) 100 mg, Oral    traMADol (ULTRAM) 50 mg, Oral, EVERY 8 HOURS PRN    valsartan-hydroCHLOROthiazide (DIOVAN-HCT) 160-25 MG per tablet 1 tablet, Oral         Objective:   Patient Vitals for the past 24 hrs:   Temp Pulse Resp BP SpO2   08/14/22 0711 97.7 °F (36.5 °C) 92 19 119/75 95 %   08/14/22 0634 -- -- 18 -- --   08/14/22 0331 97.9 °F (36.6 °C) (!) 102 16 128/83 97 %   08/14/22 0035 -- -- 16 -- --   08/14/22 0005 -- -- 18 -- --   08/14/22 0004 97.9 °F (36.6 °C) (!) 105 16 133/80 96 %   08/13/22 2045 98.1 °F (36.7 °C) 100 16 124/73 98 %   08/13/22 1506 98.2 °F (36.8 °C) (!) 101 19 135/83 97 %   08/13/22 1113 98.6 °F (37 °C) 91 18 119/71 96 %         Oxygen Therapy  SpO2: 95 %  O2 Device: None (Room air)    Estimated body mass index is 23.82 kg/m² as calculated from the following:    Height as of this encounter: 5' 3\" (1.6 m). Weight as of this encounter: 134 lb 7.7 oz (61 kg). No intake or output data in the 24 hours ending 08/14/22 1026        Physical Exam:    Blood pressure 119/75, pulse 92, temperature 97.7 °F (36.5 °C), temperature source Oral, resp. rate 19, height 5' 3\" (1.6 m), weight 134 lb 7.7 oz (61 kg), SpO2 95 %. General:    Elderly, no distress, alert , pleasant   Head:  Normocephalic, atraumatic  Eyes:  Sclerae appear normal.  Pupils equally round. ENT:  Nares appear normal, no drainage. Dry  oral mucosa with thrush   Neck:  No restricted ROM. Trachea midline   CV:   RRR. No m/r/g. No jugular venous distension. No edema   Lungs:   CTAB. No wheezing, rhonchi, or rales. Symmetric expansion. Abdomen: Bowel sounds present. Soft, tender RLQ, nondistended.   Extremities: No cyanosis or clubbing. No edema  Skin:     No rashes and normal coloration. Warm and  very dry. Neuro:   grossly intact. Psych:  Normal mood and affect. I have personally reviewed labs and tests showing:  Recent Labs:  Recent Results (from the past 24 hour(s))   POCT Glucose    Collection Time: 08/13/22 11:13 AM   Result Value Ref Range    POC Glucose 114 (H) 65 - 100 mg/dL    Performed by:  REAL Pinzon 38    CBC with Auto Differential    Collection Time: 08/13/22  1:05 PM   Result Value Ref Range    WBC 4.5 4.3 - 11.1 K/uL    RBC 2.48 (L) 4.05 - 5.2 M/uL    Hemoglobin 7.6 (L) 11.7 - 15.4 g/dL    Hematocrit 22.8 (L) 35.8 - 46.3 %    MCV 91.9 79.6 - 97.8 FL    MCH 30.6 26.1 - 32.9 PG    MCHC 33.3 31.4 - 35.0 g/dL    RDW 13.4 11.9 - 14.6 %    Platelets 875 (L) 443 - 450 K/uL    MPV 8.4 (L) 9.4 - 12.3 FL    nRBC 0.02 0.0 - 0.2 K/uL    Differential Type AUTOMATED      Seg Neutrophils 86 (H) 43 - 78 %    Lymphocytes 5 (L) 13 - 44 %    Monocytes 5 4.0 - 12.0 %    Eosinophils % 0 (L) 0.5 - 7.8 %    Basophils 0 0.0 - 2.0 %    Immature Granulocytes 3 0.0 - 5.0 %    Segs Absolute 3.9 1.7 - 8.2 K/UL    Absolute Lymph # 0.2 (L) 0.5 - 4.6 K/UL    Absolute Mono # 0.2 0.1 - 1.3 K/UL    Absolute Eos # 0.0 0.0 - 0.8 K/UL    Basophils Absolute 0.0 0.0 - 0.2 K/UL    Absolute Immature Granulocyte 0.1 0.0 - 0.5 K/UL   Basic Metabolic Panel w/ Reflex to MG    Collection Time: 08/13/22  1:05 PM   Result Value Ref Range    Sodium 133 (L) 136 - 145 mmol/L    Potassium 3.8 3.5 - 5.1 mmol/L    Chloride 106 98 - 107 mmol/L    CO2 20 (L) 21 - 32 mmol/L    Anion Gap 7 7 - 16 mmol/L    Glucose 95 65 - 100 mg/dL    BUN 13 8 - 23 MG/DL    Creatinine 1.00 0.6 - 1.0 MG/DL    GFR African American >60 >60 ml/min/1.73m2    GFR Non- 60 (L) >60 ml/min/1.73m2    Calcium 7.1 (L) 8.3 - 10.4 MG/DL   Lactic Acid    Collection Time: 08/13/22  1:05 PM   Result Value Ref Range    Lactic Acid, Plasma 0.7 0.4 - 2.0 MMOL/L   Procalcitonin Collection Time: 08/13/22  1:05 PM   Result Value Ref Range    Procalcitonin <0.05 0.00 - 0.49 ng/mL   POCT Glucose    Collection Time: 08/13/22  4:19 PM   Result Value Ref Range    POC Glucose 116 (H) 65 - 100 mg/dL    Performed by: REAL Naidu    POCT Glucose    Collection Time: 08/13/22  9:04 PM   Result Value Ref Range    POC Glucose 90 65 - 100 mg/dL    Performed by: Ida Barrett    CBC with Auto Differential    Collection Time: 08/14/22  3:02 AM   Result Value Ref Range    WBC 4.4 4.3 - 11.1 K/uL    RBC 2.36 (L) 4.05 - 5.2 M/uL    Hemoglobin 7.2 (L) 11.7 - 15.4 g/dL    Hematocrit 21.5 (L) 35.8 - 46.3 %    MCV 91.1 79.6 - 97.8 FL    MCH 30.5 26.1 - 32.9 PG    MCHC 33.5 31.4 - 35.0 g/dL    RDW 13.5 11.9 - 14.6 %    Platelets 311 (L) 342 - 450 K/uL    MPV 7.7 (L) 9.4 - 12.3 FL    nRBC 0.00 0.0 - 0.2 K/uL    Differential Type AUTOMATED      Seg Neutrophils 86 (H) 43 - 78 %    Lymphocytes 6 (L) 13 - 44 %    Monocytes 6 4.0 - 12.0 %    Eosinophils % 0 (L) 0.5 - 7.8 %    Basophils 0 0.0 - 2.0 %    Immature Granulocytes 2 0.0 - 5.0 %    Segs Absolute 3.8 1.7 - 8.2 K/UL    Absolute Lymph # 0.3 (L) 0.5 - 4.6 K/UL    Absolute Mono # 0.3 0.1 - 1.3 K/UL    Absolute Eos # 0.0 0.0 - 0.8 K/UL    Basophils Absolute 0.0 0.0 - 0.2 K/UL    Absolute Immature Granulocyte 0.1 0.0 - 0.5 K/UL   Basic Metabolic Panel w/ Reflex to MG    Collection Time: 08/14/22  3:02 AM   Result Value Ref Range    Sodium 132 (L) 136 - 145 mmol/L    Potassium 3.6 3.5 - 5.1 mmol/L    Chloride 105 98 - 107 mmol/L    CO2 21 21 - 32 mmol/L    Anion Gap 6 (L) 7 - 16 mmol/L    Glucose 100 65 - 100 mg/dL    BUN 11 8 - 23 MG/DL    Creatinine 1.00 0.6 - 1.0 MG/DL    GFR African American >60 >60 ml/min/1.73m2    GFR Non- 60 (L) >60 ml/min/1.73m2    Calcium 7.0 (L) 8.3 - 10.4 MG/DL   POCT Glucose    Collection Time: 08/14/22  7:12 AM   Result Value Ref Range    POC Glucose 102 (H) 65 - 100 mg/dL    Performed by:  REAL Naidu        I have personally reviewed imaging studies showing:  No results found. Echocardiogram:  No results found for this or any previous visit.       Meds previously ordered:  Orders Placed This Encounter   Medications    lactated ringers bolus    ondansetron (ZOFRAN) injection 4 mg    morphine injection 2 mg    potassium chloride 10 mEq/100 mL IVPB (Peripheral Line)    buPROPion (WELLBUTRIN SR) extended release tablet 150 mg    pantoprazole (PROTONIX) tablet 40 mg    pravastatin (PRAVACHOL) tablet 80 mg    sertraline (ZOLOFT) tablet 100 mg    sodium chloride flush 0.9 % injection 5-40 mL    sodium chloride flush 0.9 % injection 5-40 mL    0.9 % sodium chloride infusion    OR Linked Order Group     ondansetron (ZOFRAN-ODT) disintegrating tablet 4 mg     ondansetron (ZOFRAN) injection 4 mg    polyethylene glycol (GLYCOLAX) packet 17 g    OR Linked Order Group     acetaminophen (TYLENOL) tablet 650 mg     acetaminophen (TYLENOL) suppository 650 mg    0.9 % sodium chloride infusion    nystatin (MYCOSTATIN) 062606 UNIT/ML suspension 500,000 Units    morphine injection 2 mg    rOPINIRole (REQUIP) tablet 0.5 mg    DISCONTD: methylPREDNISolone sodium (SOLU-MEDROL) injection 40 mg    hyoscyamine (LEVSIN/SL) sublingual tablet 125 mcg    diphenoxylate-atropine (LOMOTIL) 2.5-0.025 MG per tablet 1 tablet    magnesium sulfate 4000 mg in 100 mL IVPB premix    potassium chloride 10 mEq/100 mL IVPB (Peripheral Line)    potassium chloride 10 mEq/100 mL IVPB (Peripheral Line)    potassium chloride (KLOR-CON M) extended release tablet 20 mEq    DISCONTD: iron sucrose (VENOFER) 300 mg in sodium chloride 0.9 % 250 mL IVPB    insulin lispro (HUMALOG) injection vial 0-4 Units    insulin lispro (HUMALOG) injection vial 0-4 Units    glucose chewable tablet 16 g    OR Linked Order Group     dextrose bolus 10% 125 mL     dextrose bolus 10% 250 mL    glucagon (rDNA) injection 1 mg    dextrose 10 % infusion    ferric gluconate (FERRLECIT) 125 mg in sodium chloride 0.9 % 100 mL IVPB    DISCONTD: cefTRIAXone (ROCEPHIN) 1,000 mg in sodium chloride 0.9 % 50 mL IVPB mini-bag     Order Specific Question:   Antimicrobial Indications     Answer:   Urinary Tract Infection     Order Specific Question:   UTI duration of therapy     Answer:   3 days    DISCONTD: predniSONE (DELTASONE) tablet 30 mg    OR Linked Order Group     traMADol (ULTRAM) tablet 50 mg     traMADol (ULTRAM) tablet 100 mg    methylPREDNISolone sodium (SOLU-MEDROL) injection 40 mg    DISCONTD: iron sucrose (VENOFER) 200 mg in sodium chloride 0.9 % 100 mL IVPB     Please substitute with whatever iron is available    ferrous sulfate (IRON 325) tablet 325 mg    ferric gluconate (FERRLECIT) 125 mg in sodium chloride 0.9 % 100 mL IVPB    DISCONTD: melatonin tablet 10 mg    melatonin tablet 9 mg    ciprofloxacin (CIPRO) tablet 250 mg     Order Specific Question:   Antimicrobial Indications     Answer:   Infectious Diarrhea    metroNIDAZOLE (FLAGYL) tablet 250 mg     Order Specific Question:   Antimicrobial Indications     Answer:   Infectious Diarrhea    HYDROmorphone HCl PF (DILAUDID) injection 1 mg    diatrizoate meglumine-sodium (GASTROGRAFIN) 66-10 % solution 15 mL    iopamidol (ISOVUE-370) 76 % injection 100 mL           Signed:  Mendel Ding, MD    Part of this note may have been written by using a voice dictation software. The note has been proof read but may still contain some grammatical/other typographical errors.

## 2022-08-14 NOTE — CONSULTS
Comprehensive Nutrition Assessment    Type and Reason for Visit: Reassess, Consult  Malnutrition Screening Tool: Malnutrition Screen  Have you recently lost weight without trying?: 14 to 23 pounds (2 points)  Have you been eating poorly because of a decreased appetite?: Yes (1 point)  Malnutrition Screening Tool Score: 3  TPN consult (Hospitalist)  Nutrition Recommendations/Plan:   Meals and Snacks:  Diet: Continue current order  Pt previously encouraged pt to have family/spouse provide preferred meals/food to increase kcal and protein intake during the day  Nutrition Supplement Therapy:  Medical food supplement therapy:  Continue Ensure Clear three times per day (this provides 240 kcal and 8 grams protein per bottle)  Parenteral Nutrition:  Peripheral parenteral nutrition begins at 1800 today  Will formulate as PPN tonight as port has yet to be accessed, cut off time for PN orders is 1330. Can be infused via PORT if available. Initiate: Dex 5%, 4.25% AA 2 L (85ml/hr)   Initiate 250 ml 20% lipids daily  To provide: 1180 kcal/d (78% of needs), 85 grams of protein/d (108% of needs), 100 grams of CHO/d and 2290 ml of total volume/d. Lytes/L:   Sodium ( 60 meq NaCl), Potassium (KCl not available for inclusion in TPN at this time secondary to national shortages) Phosphorus ( 30 mmol KPO4), Calcium (4.5 meq), Magnesium 5 meq   Other additives:   MVI Monday Wednesday Friday due to [employer], MTE  Nutrition Related Medication Management: Thiamine  mg x 7 days   Electrolyte protocol activated. Message sent to nursing to begin replacement now. IVF:  Discontinue at 1800  Labs:   BMP daily  Mg daily x 3 days then MWF  Phos daily x 3 days then MWF    Triglyceride tomorrow  POC Glucoses/SSI Active     Malnutrition Assessment:  Malnutrition Status:  Moderate malnutrition  Context: Chronic Illness  Findings of clinical characteristics of malnutrition:   Energy Intake:  Mild decrease in energy intake (Comment) (bites at meals x3 weeks)  Weight Loss:  Mild weight loss (specify amount and time period) (20% wt loss since 6/2021)     Body Fat Loss:  Mild body fat loss Triceps, Fat Overlying Ribs   Muscle Mass Loss:  Mild muscle mass loss Clavicles (pectoralis & deltoids), Thigh (quadraceps), Calf (gastrocnemius), Scapula (trapezius), Hand (interosseous)  Fluid Accumulation:  No significant fluid accumulation     Strength:  Not Performed  Nutrition Assessment:  Nutrition History: Pt states she has been having poor po intake x3 weeks PTA consuming bites at meals. She reports taste changes, loss of appetite, and N/V/D. Pt reports ongoing wt loss of the past 2 years weighing 200lbs prior to wt loss. Per EMR, pt weighed 167lbs 6/8/21. Do You Have Any Cultural, Congregation, or Ethnic Food Preferences?: No   Nutrition Background:   Pt with PMH notable for Crohn's disease of large and small bowel, GERD, HTN, OA, and CKD III. Pt presents d/t diarrhea, nausea, abdominal cramping and decreased po intake. Pt admitted with JULIETH and Crohn's flare. GI recommending TPN. Nutrition Interval:  Single PIV, PORT in place - not yet accessed. Pt c/o taste alterations and a mental block to eating. She reports eating one bite of a [employer] Chicken McNugget yesterday from her  yesterday, container still at bedside. Abdominal Status (last documented):   Last BM (including prior to admit): 08/13/22, GI Symptoms: Diarrhea 4 stools recorded yesterday.    Pertinent Medications: wellbutrin, cipro, lomotil (4 x daily), ferrous sulfate, SSI (no recent admin), melatonin, solu-medrol, flagyl, nystatin, protonix, Klor Con 40 meq BID, pravachol  Continuous: none  IVF: NS @ 75 ml/hr  PRN: dilaudid (last admin 8/14), morphine (last admin 8/13), zofran (last admin 8/13), tramadol (8/13)  Pertinent Labs:   Lab Results   Component Value Date/Time     08/14/2022 03:02 AM    K 3.6 08/14/2022 03:02 AM     08/14/2022 03:02 AM    CO2 21 08/14/2022 03:02 AM    BUN 11 08/14/2022 03:02 AM    CREATININE 1.00 08/14/2022 03:02 AM    GLUCOSE 100 08/14/2022 03:02 AM    CALCIUM 7.0 08/14/2022 03:02 AM    PHOS 1.6 08/14/2022 03:02 AM    MG 0.9 08/14/2022 03:02 AM      Lab Results   Component Value Date/Time    POCGLU 101 08/14/2022 11:00 AM    POCGLU 102 08/14/2022 07:12 AM    POCGLU 90 08/13/2022 09:04 PM    POCGLU 116 08/13/2022 04:19 PM    POCGLU 114 08/13/2022 11:13 AM    POCGLU 91 08/13/2022 07:52 AM     Lab Results   Component Value Date/Time    TRIG 54 08/14/2022 03:02 AM     Current Nutrition Therapies:  ADULT DIET; Regular  ADULT ORAL NUTRITION SUPPLEMENT; Breakfast, Lunch, Dinner; Clear Liquid Oral Supplement    Current Intake:   Average Meal Intake: 1-25% Average Supplements Intake: 1-25%      Anthropometric Measures:  Height: 5' 3\" (160 cm)  Current Body Wt: 134 lb 7.7 oz (61 kg) (8/10), Weight source: Bed Scale  BMI: 23.8, Normal Weight (BMI 18.5-24. 9)  Admission Body Weight: 133 lb 13.1 oz (60.7 kg) (8/9; bed scale)  Ideal Body Weight (Kg) (Calculated): 52 kg (115 lbs), 116.4 %  Usual Body Wt: 167 lb (75.8 kg) (6/8/21; MD office visit), Percent weight change: -19.9       Edema:Peripheral Vascular  Peripheral Vascular (WDL): Within Defined Limits   BMI Category Normal Weight (BMI 18.5-24. 9)  Estimated Daily Nutrient Needs:  Energy (kcal/day): 3004-6552 (Kcal/kg (25-30) Weight used:   Current (60.7kg)  Protein (g/day): 61-79 (1-1.3 g/kg) Weight Used: (Current) 61 kg (8/10)  Fluid (ml/day):   (1 ml/kcal)    Nutrition Diagnosis:   Inadequate oral intake related to altered GI function, altered taste perception (loss of appetite, food preferences) as evidenced by  (pt reported barriers, po <25% needs, reports significant gi losses)  Moderate malnutrition, In context of chronic illness related to inadequate protein-energy intake as evidenced by Criteria as identified in malnutrition assessment  Nutrition Interventions:   Food and/or Nutrient Delivery: Continue Current Diet, Continue Oral Nutrition Supplement, Start Parenteral Nutrition     Coordination of Nutrition Care: Continue to monitor while inpatient  Plan of Care discussed with: Dr. Chika Cho and Latihsa Obrien RN    Goals:   Previous Goal Met: No Progress toward Goal(s)  Active Goal: Tolerate nutrition support at goal rate (within 3 days)       Nutrition Monitoring and Evaluation:      Food/Nutrient Intake Outcomes: Food and Nutrient Intake, Supplement Intake, Parenteral Nutrition Intake/Tolerance  Physical Signs/Symptoms Outcomes: Biochemical Data, GI Status, Fluid Status or Edema, Meal Time Behavior, Weight    Discharge Planning:     Too soon to determine    SAEED OBRIEN, RD

## 2022-08-14 NOTE — PROGRESS NOTES
8/14/2022    Admit Date: 8/8/2022    Subjective:   CHIEF COMPLAINT- diarrhea and pain  HPI      Overall-doing a lot better this AM           Diet-reg but mostly broth    Appetite-poor     Nausea-min   Vomiting-no          Pain-mild- takes tramadol at home- not quite as bad- got some dilaudid yesterday            BM-about 5 stools   Bleeding-no    Medications-reviewed and adjusted as appropriate   IV FLUIDS-reviewed      FAM HX-per H&P   SH-per H&P   Tob-yes            Etoh-no      Past Medical History:   Diagnosis Date    Depression     managed with medication     Disc prolapse     L4 to L5    DJD (degenerative joint disease)     GERD (gastroesophageal reflux disease)     managed with medication     Hypertension     managed with medication     Obesity (BMI 30-39. 9)     BMI 31.5    Osteoarthritis     Steroid-induced diabetes mellitus ([address] 6/5/2013    Steroid-induced diabetes mellitus ([address] 6/5/2013    no present medication needed     Ulcerative colitis ([address]     managed with medication                Past Surgical History:   Procedure Laterality Date    COLONOSCOPY  2009    showed UC    FLEXIBLE SIGMOIDOSCOPY N/A 5/11/2016    SIGMOIDOSCOPY FLEXIBLE/   BMI 36 performed by Tania Shane MD at [facility]    left mastoidectomy    HEENT  1971    tympanoplasty    INNER EAR SURGERY PROC UNLISTED  43,38,71    mastoid cyst +TM repair-hearing loss, left ear    TONSILLECTOMY  1971         VASCULAR SURGERY  06/5/2013 [place]    port placement    VASCULAR SURGERY  2013    port placement for remicade       ROS--                 RESP-neg            CARDIAC-neg                       -neg             Further ROS as per PMH and PSH- see problem list                            Objective:     Visit Vitals  /75   Pulse 92   Temp 97.7 °F (36.5 °C) (Oral)   Resp 19   Ht 5' 3\" (1.6 m)   Wt 134 lb 7.7 oz (61 kg)   SpO2 95%   BMI 23.82 kg/m²         Intake/Output Summary (Last 24 hours) at 8/14/2022 8846  Last data filed at 8/13/2022 0735  Gross per 24 hour   Intake 0 ml   Output --   Net 0 ml         EXAM:     NEURO-a&o    HEENT-moon facies    LUNGS-clear       COR-rrr        ABD-soft , min tenderness, min  rebound, good bs- improved exam today        EXT-no edema                         LABS-  Lab Results   Component Value Date/Time    WBC 4.4 08/14/2022 03:02 AM    RBC 2.36 08/14/2022 03:02 AM    HGB 7.2 08/14/2022 03:02 AM    HCT 21.5 08/14/2022 03:02 AM     08/14/2022 03:02 AM     Lab Results   Component Value Date/Time     08/14/2022 03:02 AM    K 3.6 08/14/2022 03:02 AM     08/14/2022 03:02 AM    CO2 21 08/14/2022 03:02 AM    BUN 11 08/14/2022 03:02 AM    GFRAA >60 08/14/2022 03:02 AM    MG 2.0 08/10/2022 05:04 AM    PHOS 2.5 08/09/2022 03:52 PM    GLOB 4.4 08/09/2022 04:26 AM    ALT 12 08/09/2022 04:26 AM     CT-   1. Circumferential wall thickening over a long segment of colon involving the   distal transverse, descending and sigmoid colon. The apparent thickening may be   accentuated by incomplete luminal distention. Colitis could be considered in the   setting of abdominal pain. 2. Hydropic gallbladder, no gallstones or obvious gallbladder wall thickening.                 Assessment:     Principal Problem:    JULIETH (acute kidney injury) ([facility]  Active Problems:    Crohn's disease ([facility]    Hyponatremia    Hypertension    Hyperlipidemia    Depression    Moderate protein-calorie malnutrition (HCC)    RLS (restless legs syndrome)    CKD (chronic kidney disease) stage 3, GFR 30-59 ml/min ([facility])    Acute hypokalemia    Anemia    Essential hypertension, benign      Renal function improved  Sxs better  Mag and k low  Iron low    8/11/22  Almost ready for discharge  Cont ivf for JULIETH  Switch to prednisone  Tramadol prn     8/12/22  Did not tolerate po switch  Afraid if she goes home she will be right back  Creat better   Does not like to take pain meds    8/13/22  Not any better  Need to consider TPN  There is a stool organism being evaluated  Last CT w/o contrast  Alb 2.0    8/14/ 22- she is better with iv steroids but having some side effects such as shaking  There is still a pending stool culture hidden under the results tab  On cipro / flagyl/ and steroids  If she does not respond she may need a colectomy and this was discussed with her  Hx of SB disease also so may need to get a SBFT    Plan:     Cont meds  Diet as tolerated  Encourage ambulation  Would start TPN if possible  Got iv iron and started po  Dr. Ar avendano f/u in AM         Tanner Krueger MD

## 2022-08-14 NOTE — PROGRESS NOTES
Vascular Access    PICC Order received this am, discussed via [employer] with Dr. Mayuri Wilkes at 12pm. Patient with hx of CKD3, will need nephrology approval for PICC. Patient does have existing left chest port, placed 6/11/2013. Port last accessed noted in the chart that I was able to find was 11/18/21 for 1L NS bolus, per documentation port was accessed without difficulty and had good blood return. Notified MD.   Discussed with primary care RN, port was accessed.     Carin Doyle RN

## 2022-08-14 NOTE — PROGRESS NOTES
Patient resting in bed on room air. A&Ox4. Respirations even and unlabored. Patient reports pain in abdomen, medicated, see MAR. IVF infusing. No needs expressed. Patient was able to get sleep during night at long intervals. No acute events overnight. Pt reports weakness - patient encouraged PO intake throughout shift, given chicken broth on two occasions. Call light within reach, will continue to monitor. Preparing to give report to oncoming RN.

## 2022-08-15 LAB
ANION GAP SERPL CALC-SCNC: 6 MMOL/L (ref 7–16)
BASOPHILS # BLD: 0 K/UL (ref 0–0.2)
BASOPHILS NFR BLD: 0 % (ref 0–2)
BUN SERPL-MCNC: 13 MG/DL (ref 8–23)
CALCIUM SERPL-MCNC: 7 MG/DL (ref 8.3–10.4)
CHLORIDE SERPL-SCNC: 106 MMOL/L (ref 98–107)
CO2 SERPL-SCNC: 21 MMOL/L (ref 21–32)
CREAT SERPL-MCNC: 1.1 MG/DL (ref 0.6–1)
DIFFERENTIAL METHOD BLD: ABNORMAL
EOSINOPHIL # BLD: 0 K/UL (ref 0–0.8)
EOSINOPHIL NFR BLD: 0 % (ref 0.5–7.8)
ERYTHROCYTE [DISTWIDTH] IN BLOOD BY AUTOMATED COUNT: 13.5 % (ref 11.9–14.6)
GLUCOSE BLD STRIP.AUTO-MCNC: 165 MG/DL (ref 65–100)
GLUCOSE BLD STRIP.AUTO-MCNC: 195 MG/DL (ref 65–100)
GLUCOSE BLD STRIP.AUTO-MCNC: 202 MG/DL (ref 65–100)
GLUCOSE BLD STRIP.AUTO-MCNC: 221 MG/DL (ref 65–100)
GLUCOSE SERPL-MCNC: 260 MG/DL (ref 65–100)
HCT VFR BLD AUTO: 21.1 % (ref 35.8–46.3)
HGB BLD-MCNC: 7.1 G/DL (ref 11.7–15.4)
IMM GRANULOCYTES # BLD AUTO: 0.1 K/UL (ref 0–0.5)
IMM GRANULOCYTES NFR BLD AUTO: 2 % (ref 0–5)
LYMPHOCYTES # BLD: 0.2 K/UL (ref 0.5–4.6)
LYMPHOCYTES NFR BLD: 4 % (ref 13–44)
MAGNESIUM SERPL-MCNC: 2 MG/DL (ref 1.8–2.4)
MCH RBC QN AUTO: 30.9 PG (ref 26.1–32.9)
MCHC RBC AUTO-ENTMCNC: 33.6 G/DL (ref 31.4–35)
MCV RBC AUTO: 91.7 FL (ref 79.6–97.8)
MONOCYTES # BLD: 0.2 K/UL (ref 0.1–1.3)
MONOCYTES NFR BLD: 5 % (ref 4–12)
NEUTS SEG # BLD: 4.4 K/UL (ref 1.7–8.2)
NEUTS SEG NFR BLD: 90 % (ref 43–78)
NRBC # BLD: 0 K/UL (ref 0–0.2)
PHOSPHATE SERPL-MCNC: 3.3 MG/DL (ref 2.3–3.7)
PLATELET # BLD AUTO: 122 K/UL (ref 150–450)
PMV BLD AUTO: 7.9 FL (ref 9.4–12.3)
POTASSIUM SERPL-SCNC: 4.1 MMOL/L (ref 3.5–5.1)
RBC # BLD AUTO: 2.3 M/UL (ref 4.05–5.2)
SERVICE CMNT-IMP: ABNORMAL
SODIUM SERPL-SCNC: 133 MMOL/L (ref 136–145)
TRIGL SERPL-MCNC: 55 MG/DL (ref 35–150)
WBC # BLD AUTO: 4.9 K/UL (ref 4.3–11.1)

## 2022-08-15 PROCEDURE — 2500000003 HC RX 250 WO HCPCS: Performed by: INTERNAL MEDICINE

## 2022-08-15 PROCEDURE — 6370000000 HC RX 637 (ALT 250 FOR IP): Performed by: INTERNAL MEDICINE

## 2022-08-15 PROCEDURE — 2580000003 HC RX 258: Performed by: INTERNAL MEDICINE

## 2022-08-15 PROCEDURE — 1100000000 HC RM PRIVATE

## 2022-08-15 PROCEDURE — 6360000002 HC RX W HCPCS: Performed by: INTERNAL MEDICINE

## 2022-08-15 PROCEDURE — 83735 ASSAY OF MAGNESIUM: CPT

## 2022-08-15 PROCEDURE — 6370000000 HC RX 637 (ALT 250 FOR IP): Performed by: PHYSICIAN ASSISTANT

## 2022-08-15 PROCEDURE — 6370000000 HC RX 637 (ALT 250 FOR IP): Performed by: HOSPITALIST

## 2022-08-15 PROCEDURE — 82962 GLUCOSE BLOOD TEST: CPT

## 2022-08-15 PROCEDURE — 84100 ASSAY OF PHOSPHORUS: CPT

## 2022-08-15 PROCEDURE — 80048 BASIC METABOLIC PNL TOTAL CA: CPT

## 2022-08-15 PROCEDURE — 85025 COMPLETE CBC W/AUTO DIFF WBC: CPT

## 2022-08-15 PROCEDURE — 84478 ASSAY OF TRIGLYCERIDES: CPT

## 2022-08-15 RX ORDER — SODIUM CHLORIDE 9 MG/ML
INJECTION, SOLUTION INTRAVENOUS CONTINUOUS
Status: DISCONTINUED | OUTPATIENT
Start: 2022-08-15 | End: 2022-08-16

## 2022-08-15 RX ADMIN — NYSTATIN 500000 UNITS: 100000 SUSPENSION ORAL at 08:06

## 2022-08-15 RX ADMIN — NYSTATIN 500000 UNITS: 100000 SUSPENSION ORAL at 17:01

## 2022-08-15 RX ADMIN — DIPHENOXYLATE HYDROCHLORIDE AND ATROPINE SULFATE 1 TABLET: 2.5; .025 TABLET ORAL at 17:01

## 2022-08-15 RX ADMIN — HYDROMORPHONE HYDROCHLORIDE 1 MG: 1 INJECTION, SOLUTION INTRAMUSCULAR; INTRAVENOUS; SUBCUTANEOUS at 04:31

## 2022-08-15 RX ADMIN — METRONIDAZOLE 250 MG: 500 TABLET ORAL at 06:03

## 2022-08-15 RX ADMIN — DIPHENOXYLATE HYDROCHLORIDE AND ATROPINE SULFATE 1 TABLET: 2.5; .025 TABLET ORAL at 20:25

## 2022-08-15 RX ADMIN — HYDROMORPHONE HYDROCHLORIDE 1 MG: 1 INJECTION, SOLUTION INTRAMUSCULAR; INTRAVENOUS; SUBCUTANEOUS at 17:05

## 2022-08-15 RX ADMIN — HYOSCYAMINE SULFATE 125 MCG: 0.12 TABLET ORAL at 20:24

## 2022-08-15 RX ADMIN — POTASSIUM CHLORIDE 20 MEQ: 20 TABLET, EXTENDED RELEASE ORAL at 08:06

## 2022-08-15 RX ADMIN — FERROUS SULFATE TAB 325 MG (65 MG ELEMENTAL FE) 325 MG: 325 (65 FE) TAB at 17:01

## 2022-08-15 RX ADMIN — INSULIN LISPRO 1 UNITS: 100 INJECTION, SOLUTION INTRAVENOUS; SUBCUTANEOUS at 12:26

## 2022-08-15 RX ADMIN — PANTOPRAZOLE SODIUM 40 MG: 40 TABLET, DELAYED RELEASE ORAL at 06:03

## 2022-08-15 RX ADMIN — HYOSCYAMINE SULFATE 125 MCG: 0.12 TABLET ORAL at 08:06

## 2022-08-15 RX ADMIN — CIPROFLOXACIN 250 MG: 500 TABLET, FILM COATED ORAL at 08:06

## 2022-08-15 RX ADMIN — FERROUS SULFATE TAB 325 MG (65 MG ELEMENTAL FE) 325 MG: 325 (65 FE) TAB at 08:06

## 2022-08-15 RX ADMIN — SOYBEAN OIL 250 ML: 20 INJECTION, SOLUTION INTRAVENOUS at 17:45

## 2022-08-15 RX ADMIN — DIPHENOXYLATE HYDROCHLORIDE AND ATROPINE SULFATE 1 TABLET: 2.5; .025 TABLET ORAL at 08:06

## 2022-08-15 RX ADMIN — HYDROMORPHONE HYDROCHLORIDE 1 MG: 1 INJECTION, SOLUTION INTRAMUSCULAR; INTRAVENOUS; SUBCUTANEOUS at 23:38

## 2022-08-15 RX ADMIN — CIPROFLOXACIN 250 MG: 500 TABLET, FILM COATED ORAL at 20:29

## 2022-08-15 RX ADMIN — ROPINIROLE HYDROCHLORIDE 0.5 MG: 0.5 TABLET, FILM COATED ORAL at 20:25

## 2022-08-15 RX ADMIN — HYDROMORPHONE HYDROCHLORIDE 1 MG: 1 INJECTION, SOLUTION INTRAMUSCULAR; INTRAVENOUS; SUBCUTANEOUS at 10:42

## 2022-08-15 RX ADMIN — NYSTATIN 500000 UNITS: 100000 SUSPENSION ORAL at 12:25

## 2022-08-15 RX ADMIN — SODIUM CHLORIDE, PRESERVATIVE FREE 10 ML: 5 INJECTION INTRAVENOUS at 20:25

## 2022-08-15 RX ADMIN — INSULIN LISPRO 1 UNITS: 100 INJECTION, SOLUTION INTRAVENOUS; SUBCUTANEOUS at 08:07

## 2022-08-15 RX ADMIN — THIAMINE HYDROCHLORIDE 100 MG: 100 INJECTION, SOLUTION INTRAMUSCULAR; INTRAVENOUS at 08:06

## 2022-08-15 RX ADMIN — METHYLPREDNISOLONE SODIUM SUCCINATE 40 MG: 125 INJECTION, POWDER, FOR SOLUTION INTRAMUSCULAR; INTRAVENOUS at 20:30

## 2022-08-15 RX ADMIN — BUPROPION HYDROCHLORIDE 150 MG: 150 TABLET, EXTENDED RELEASE ORAL at 08:06

## 2022-08-15 RX ADMIN — SODIUM CHLORIDE, PRESERVATIVE FREE 10 ML: 5 INJECTION INTRAVENOUS at 08:06

## 2022-08-15 RX ADMIN — NYSTATIN 500000 UNITS: 100000 SUSPENSION ORAL at 20:24

## 2022-08-15 RX ADMIN — HYOSCYAMINE SULFATE 125 MCG: 0.12 TABLET ORAL at 12:25

## 2022-08-15 RX ADMIN — Medication 9 MG: at 20:24

## 2022-08-15 RX ADMIN — METRONIDAZOLE 250 MG: 500 TABLET ORAL at 20:25

## 2022-08-15 RX ADMIN — METRONIDAZOLE 250 MG: 500 TABLET ORAL at 12:24

## 2022-08-15 RX ADMIN — SODIUM CHLORIDE: 9 INJECTION, SOLUTION INTRAVENOUS at 10:43

## 2022-08-15 RX ADMIN — METHYLPREDNISOLONE SODIUM SUCCINATE 40 MG: 125 INJECTION, POWDER, FOR SOLUTION INTRAMUSCULAR; INTRAVENOUS at 08:05

## 2022-08-15 RX ADMIN — SERTRALINE HYDROCHLORIDE 100 MG: 25 TABLET ORAL at 08:06

## 2022-08-15 RX ADMIN — PRAVASTATIN SODIUM 80 MG: 80 TABLET ORAL at 20:25

## 2022-08-15 RX ADMIN — DIPHENOXYLATE HYDROCHLORIDE AND ATROPINE SULFATE 1 TABLET: 2.5; .025 TABLET ORAL at 12:24

## 2022-08-15 RX ADMIN — MAGNESIUM SULFATE HEPTAHYDRATE: 500 INJECTION, SOLUTION INTRAMUSCULAR; INTRAVENOUS at 17:45

## 2022-08-15 ASSESSMENT — PAIN DESCRIPTION - LOCATION
LOCATION: ABDOMEN

## 2022-08-15 ASSESSMENT — PAIN DESCRIPTION - DESCRIPTORS
DESCRIPTORS: ACHING
DESCRIPTORS: ACHING

## 2022-08-15 ASSESSMENT — PAIN SCALES - GENERAL
PAINLEVEL_OUTOF10: 0
PAINLEVEL_OUTOF10: 8
PAINLEVEL_OUTOF10: 6
PAINLEVEL_OUTOF10: 0
PAINLEVEL_OUTOF10: 0
PAINLEVEL_OUTOF10: 7
PAINLEVEL_OUTOF10: 6
PAINLEVEL_OUTOF10: 9
PAINLEVEL_OUTOF10: 8
PAINLEVEL_OUTOF10: 9

## 2022-08-15 ASSESSMENT — PAIN DESCRIPTION - ORIENTATION
ORIENTATION: LOWER
ORIENTATION: MID

## 2022-08-15 ASSESSMENT — PAIN DESCRIPTION - ONSET: ONSET: ON-GOING

## 2022-08-15 NOTE — PROGRESS NOTES
CM following for d/c needs. No known needs at d/c at this time - CM will continue to follow. GI following - not medically stable for d/c at this time. Patient currently on TPN.

## 2022-08-15 NOTE — PROGRESS NOTES
[facility] Progress Note         Admit Date:  8/8/2022    Today's Date:  8/15/2022    CC:  Crohn's, abd pain, diarrhea    Subjective: \"Feeling much better\" this AM.  One small stool reported this AM by RN, none throughout the night, although she reports having \"a dozen\" episodes of diarrhea in the past 24 hours. Abd pain persists, improves with dilaudid. Poor appetite, although tolerating PO without significant n/v. Apprehensive to transition to PO steroids.     Medications:   Current Facility-Administered Medications   Medication Dose Route Frequency    0.9 % sodium chloride infusion   IntraVENous Continuous    PN-Adult Premix 4.25/5 - Peripheral Line   IntraVENous Continuous TPN    fat emulsion 20 % infusion 250 mL  250 mL IntraVENous Daily    thiamine (B-1) injection 100 mg  100 mg IntraVENous Daily    potassium chloride 20 mEq/50 mL IVPB (Central Line)  20 mEq IntraVENous PRN    Or    potassium chloride 10 mEq/100 mL IVPB (Peripheral Line)  10 mEq IntraVENous PRN    magnesium sulfate 2000 mg in 50 mL IVPB premix  2,000 mg IntraVENous PRN    sodium phosphate 10 mmol in sodium chloride 0.9 % 250 mL IVPB  10 mmol IntraVENous PRN    Or    sodium phosphate 15 mmol in sodium chloride 0.9 % 250 mL IVPB  15 mmol IntraVENous PRN    Or    sodium phosphate 20 mmol in sodium chloride 0.9 % 500 mL IVPB  20 mmol IntraVENous PRN    ciprofloxacin (CIPRO) tablet 250 mg  250 mg Oral 2 times per day    metroNIDAZOLE (FLAGYL) tablet 250 mg  250 mg Oral 3 times per day    HYDROmorphone HCl PF (DILAUDID) injection 1 mg  1 mg IntraVENous Q4H PRN    diatrizoate meglumine-sodium (GASTROGRAFIN) 66-10 % solution 15 mL  15 mL Oral ONCE PRN    methylPREDNISolone sodium (SOLU-MEDROL) injection 40 mg  40 mg IntraVENous Q12H    ferrous sulfate (IRON 325) tablet 325 mg  325 mg Oral BID WC    melatonin tablet 9 mg  9 mg Oral Nightly    traMADol (ULTRAM) tablet 50 mg  50 mg Oral Q6H PRN    Or    traMADol (ULTRAM) tablet 100 mg  100 mg Oral Q6H PRN    insulin lispro (HUMALOG) injection vial 0-4 Units  0-4 Units SubCUTAneous TID WC    insulin lispro (HUMALOG) injection vial 0-4 Units  0-4 Units SubCUTAneous Nightly    glucose chewable tablet 16 g  4 tablet Oral PRN    dextrose bolus 10% 125 mL  125 mL IntraVENous PRN    Or    dextrose bolus 10% 250 mL  250 mL IntraVENous PRN    glucagon (rDNA) injection 1 mg  1 mg SubCUTAneous PRN    dextrose 10 % infusion   IntraVENous Continuous PRN    hyoscyamine (LEVSIN/SL) sublingual tablet 125 mcg  125 mcg SubLINGual TID    diphenoxylate-atropine (LOMOTIL) 2.5-0.025 MG per tablet 1 tablet  1 tablet Oral 4x Daily    buPROPion (WELLBUTRIN SR) extended release tablet 150 mg  150 mg Oral QAM    pantoprazole (PROTONIX) tablet 40 mg  40 mg Oral QAM AC    pravastatin (PRAVACHOL) tablet 80 mg  80 mg Oral Nightly    sertraline (ZOLOFT) tablet 100 mg  100 mg Oral Daily    sodium chloride flush 0.9 % injection 5-40 mL  5-40 mL IntraVENous 2 times per day    sodium chloride flush 0.9 % injection 5-40 mL  5-40 mL IntraVENous PRN    0.9 % sodium chloride infusion   IntraVENous PRN    ondansetron (ZOFRAN-ODT) disintegrating tablet 4 mg  4 mg Oral Q8H PRN    Or    ondansetron (ZOFRAN) injection 4 mg  4 mg IntraVENous Q6H PRN    polyethylene glycol (GLYCOLAX) packet 17 g  17 g Oral Daily PRN    acetaminophen (TYLENOL) tablet 650 mg  650 mg Oral Q6H PRN    Or    acetaminophen (TYLENOL) suppository 650 mg  650 mg Rectal Q6H PRN    nystatin (MYCOSTATIN) 958576 UNIT/ML suspension 500,000 Units  5 mL Oral 4x Daily    morphine injection 2 mg  2 mg IntraVENous Q4H PRN    rOPINIRole (REQUIP) tablet 0.5 mg  0.5 mg Oral Nightly       Review of Systems:  ROS was obtained, with pertinent positives as listed above. No chest pain or SOB.     Diet:      Objective:   Vitals:  /72   Pulse 93   Temp 97.7 °F (36.5 °C) (Oral)   Resp 18   Ht 5' 3\" (1.6 m)   Wt 148 lb 5.9 oz (67.3 kg)   SpO2 92%   BMI 26.28 kg/m² Intake/Output:  08/15 0701 - 08/15 1900  In: 240 [P.O.:240]  Out: -   08/13 1901 - 08/15 0700  In: -   Out: 15     Exam:  GENERAL: Alert, cooperative, in no acute distress  CV: Regular rate and rhythm  RESP: Clear to auscultation bilaterally anteriorly  ABD: Soft, non-tender, non-distended, normoactive bowel sounds, no organomegaly appreciated  EXTREM: Extremities normal, atraumatic, no cyanosis or edema  NEURO: Awake and alert    Data Review (Labs):    Recent Labs     08/13/22  1305 08/14/22  0302 08/15/22  0425   WBC 4.5 4.4 4.9   HGB 7.6* 7.2* 7.1*   HCT 22.8* 21.5* 21.1*   * 124* 122*   MCV 91.9 91.1 91.7   * 132* 133*   K 3.8 3.6 4.1    105 106   CO2 20* 21 21   BUN 13 11 13   MG  --  0.9* 2.0       Assessment:     Principal Problem:    JULIETH (acute kidney injury) ([facility] [address]  Active Problems:    Crohn's disease (HCC)    Hyponatremia    Hypertension    Hyperlipidemia    Depression    Moderate protein-calorie malnutrition (HCC)    Iron deficiency anemia due to chronic blood loss    RLS (restless legs syndrome)    CKD (chronic kidney disease) stage 3, GFR 30-59 ml/min (HCC)    Acute hypokalemia    Colitis    Anemia    Essential hypertension, benign  Resolved Problems:    * No resolved hospital problems. *    65 y.o. female with PMH including but not limited to crohns disease (lg and sm bowel), HTN, CKD3, depression, HLP, RLS, who is seen in consultation at the request of Dr. Juanito Garcia for Crohn's flare with abdominal cramping, nausea, rectal spasm, non-bloody diarrhea, dehydration, and wt loss. She has failed Remicade, Humira, Entyvio, Stelara. Currenlty on [place] but not in remission. Recent Cdiff neg, Calprotectin >1200 and CT evidence of bowel thickening cw CD. Has electrolyte imbalance and JULIETH on admission.     8/10/22:  Renal function improved  Sxs better  Mag and k low  Iron low     8/11/22  Almost ready for discharge  Cont ivf for JULIETH  Switch to prednisone  Tramadol prn     8/12/22  Did

## 2022-08-15 NOTE — PROGRESS NOTES
Patient sleeping in bed on room air. Respirations even and unlabored. Patient does not appear to be in pain and is in no acute distress at this time. PPN infusing. No needs expressed. Call light within reach, will continue to monitor.

## 2022-08-15 NOTE — PROGRESS NOTES
Hospitalist Progress Note   Admit Date:  2022  3:17 PM   Name:  Rebecca Camp   Age:  65 y.o. Sex:  female  :  1960   MRN:  [id number]   Room:  [facility]/    Presenting Complaint: Nausea     Reason(s) for Admission: Dehydration [E86.0]  Hypokalemia [E87.6]  JULIETH (acute kidney injury) ([facility] [address] [N17.9]  Diarrhea, unspecified type [R19.7]  Acute renal failure superimposed on chronic kidney disease, unspecified CKD stage, unspecified acute renal failure type ([facility] [address] [N17.9, N18.9]     History of Present Illness:       Rebecca Camp is a 65 y.o. female with medical history of crohns disease, HTN, CKD3, depression, HLP, RLS, who is evaluated with complaints os several weeks of nausea, emesis and abdominal pain. She was seen for possible crohn's flare by GI last week. She was sent for CT chest/ AP but is not aware of the results. She did stool studies. Not improving at home with Pedialyte. She has ongoing metallic taste, anorexia, weight loss, dry skin, abdominal pain , decreased urination, diarrhea, nausea. Some dysuria. She has been mostly in the bed due to weakness, feels shaky. Has rectal spasms, burping and hiccups. Denies rectal bleeding. No fever. She is out of her [facility] for 5 days. She is followed by nephrology but not seen by 4-5 years. She thinks her renal function was about 40 %. Prior creatinine <2.0. today 3.80. potassium 2.8, sodium 127. HGB 10.0. FULL CODE     Rasta Steve [phone]        As previously documented:    Mrs. Becky King Had increased abdominal pain and cramping 2022 which started when transitioned to p.o. meds  Back on IV Solu-Medrol 2022   She does not personally feel like the Klaudia Mayer is working      2022-  Her  states that the patient's father has donated millions of dollars to this hospital and he is not appreciative of the anticipated discharge date on the white board. ...   He is frustrated over the patient's clinical condition for the last several years. .. States no one can give him answers. .. And we are talking about discharge. .. She complained of significant abdominal pain after being bladder scanned  she was started on Dilaudid and a CT abdomen pelvis was done at her request  She was also started on TPN    08/14/2022  Patient continues to complain of multiple bouts diarrhea that has not been witnessed by the nursing staff. She had 4 episodes of stool documented yesterday. Per nursing staff initial diarrhea that she had on admission slowed markedly after starting Lomotil. She states that she cannot survive on pills so she does not know how she will make it outside of the  hospital.  CT abdomen and pelvis done yesterday showed bowel wall thickening query secondary to decreased distention versus colitis. 8/15/2022  Patient seen and evaluated. Afebrile since admission  No BM reported overnight by nursing. States that she feels stronger after being on TPN and that she finally got some rest last night  She was also able to taking about 5 bites of food  Despite being informed to let nursing know every time she gets up to use the bathroom she is not doing so. However this morning  after 730 she was noted to have about 50 mL of loose stool   complains of significant abdominal pain but it has improved since starting Dilaudid. States she may be ready to go home tomorrow if GI is not performing an endoscopy.       Assessment & Plan:     Principal Problem:    JULIETH (acute kidney injury) ([address]  CKD (chronic kidney disease) stage 3, GFR 30-59 ml/min ([address]  8/15/2022  Continue IV fluid hydration-we will decrease the rate of fluids to 75 ml per hour  Renal function currently stable and improving with hydration; continue to trend  continue to monitor electrolytes and correct as needed  Nephrology input appreciated    Active Problems:    Crohn's disease ([address]  With flare:  GI input is appreciated-  Continue TPN continue Solu-Medrol   Asked patient to have nursing see any output she has-she is not very compliant and complains of multiple bowel movements that she does not call nursing services for. Ask nursing to continue to monitor her output as they are able to. CT findings as above  She is out of her xeljanz-does not feel this medication is working for her  C DIFF was negative   Continue IV Dilaudid,  continue Protonix          Hyponatremia  8/15/2022  Stable        Hypertension  8/15/2022  Continue to hold home meds for now  As needed blood pressure meds as needed        Hyperlipidemia  8/15/2022    Continue statin         Depression  8/15/2022     Continue Wellbutrin and zoloft             RLS (restless legs syndrome)  8/15/2022    Continue Requip      Acute hypokalemia  8/15/2022    Currently resolved   continue to monitor and replace as needed   magnesium replaced August 10, 2022 a  And replaced again on August 14, 2022  We will send a repeat mag tomorrow      Anemia  8/15/2022    HGB 10.0-->8.8--> 7.5-->7. 2  Appears dilutional with hydration  No active bleeding seen        Thrush:  8/15/2022  Continue nystatin        Dispo/Discharge Planning:   TBD based on her clinical course    Patient is high risk with IV narcotics on board  Also very difficult clinical situation as she is claiming bowel movements. That are out proportion to the bowel movements actually witnessed by staff    Diet: ADULT DIET;  Regular  ADULT ORAL NUTRITION SUPPLEMENT; Breakfast, Lunch, Dinner; Clear Liquid Oral Supplement  PN-Adult Premix 4.25/5 - Peripheral Line  VTE ppx: SCD  Code status: Full Code    Hospital Problems:  Principal Problem:    JULIETH (acute kidney injury) ([facility] [address]  Active Problems:    Crohn's disease ([facility] [address]    Hyponatremia    Hypertension    Hyperlipidemia    Depression    Moderate protein-calorie malnutrition (HCC)    Iron deficiency anemia due to chronic blood loss    RLS (restless legs syndrome)    CKD (chronic kidney disease) stage 3, GFR 30-59 ml/min ([facility]    Acute hypokalemia    Colitis    Anemia    Essential hypertension, benign  Resolved Problems:    * No resolved hospital problems. *       Past History:     Past Medical History:   Diagnosis Date    Depression     managed with medication     Disc prolapse     L4 to L5    DJD (degenerative joint disease)     GERD (gastroesophageal reflux disease)     managed with medication     Hypertension     managed with medication     Obesity (BMI 30-39. 9)     BMI 31.5    Osteoarthritis     Steroid-induced diabetes mellitus ([facility] [address] 6/5/2013    Steroid-induced diabetes mellitus ([address] 6/5/2013    no present medication needed     Ulcerative colitis ([facility] [address]     managed with medication        Past Surgical History:   Procedure Laterality Date    COLONOSCOPY  2009    showed UC    FLEXIBLE SIGMOIDOSCOPY N/A 5/11/2016    SIGMOIDOSCOPY FLEXIBLE/   BMI 36 performed by Jessi Bajwa MD at [facility] 10    left mastoidectomy    HEENT  1971    tympanoplasty    INNER EAR SURGERY PROC UNLISTED  11,31,02    mastoid cyst +TM repair-hearing loss, left ear    TONSILLECTOMY  1971         VASCULAR SURGERY  06/5/2013 [place]    port placement    VASCULAR SURGERY  2013    port placement for remicade        Social History     Tobacco Use    Smoking status: Every Day     Packs/day: 1.00     Types: Cigarettes    Smokeless tobacco: Never   Substance Use Topics    Alcohol use: No      Social History     Substance and Sexual Activity   Drug Use No       Family History   Problem Relation Age of Onset    Osteoarthritis Brother     Heart Disease Father     Hypertension Father     Breast Cancer Neg Hx     Diabetes Mother         type I            There is no immunization history on file for this patient.   Allergies   Allergen Reactions    Codeine Nausea Only     Prior to Admit Medications:  Current Outpatient Medications   Medication Instructions    acetaminophen (TYLENOL) 650 mg, Oral, EVERY 6 HOURS PRN    buPROPion (WELLBUTRIN XL) 150 mg, Oral, EVERY MORNING    dexlansoprazole (DEXILANT) 60 mg, Oral    irbesartan-hydroCHLOROthiazide (AVALIDE) 150-12.5 MG per tablet 1 tablet, Oral, EVERY OTHER DAY    LORazepam (ATIVAN) 1 mg, Oral    mesalamine (DELZICOL) 400 mg, 2 TIMES DAILY    pravastatin (PRAVACHOL) 80 mg, Oral    predniSONE 10 MG (21) TBPK SEE ADMIN INSTRUCTIONS    Probiotic Product (ACIDOPHILUS PROBIOTIC) CAPS capsule 1 tablet, Oral    rOPINIRole (REQUIP) 0.5 mg, Oral    sertraline (ZOLOFT) 100 mg, Oral    traMADol (ULTRAM) 50 mg, Oral, EVERY 8 HOURS PRN    valsartan-hydroCHLOROthiazide (DIOVAN-HCT) 160-25 MG per tablet 1 tablet, Oral         Objective:   Patient Vitals for the past 24 hrs:   Temp Pulse Resp BP SpO2   08/15/22 1055 97.7 °F (36.5 °C) 93 18 127/72 92 %   08/15/22 0736 98.1 °F (36.7 °C) 95 16 113/65 97 %   08/15/22 0501 -- -- 16 -- --   08/15/22 0431 -- -- 18 -- --   08/15/22 0333 98.1 °F (36.7 °C) 88 16 120/74 95 %   08/14/22 2256 99.5 °F (37.5 °C) 98 14 118/73 91 %   08/14/22 2140 -- -- 16 -- --   08/14/22 2110 -- -- 16 -- --   08/14/22 1915 98.4 °F (36.9 °C) 99 14 115/66 96 %   08/14/22 1642 98.8 °F (37.1 °C) 98 18 112/67 96 %         Oxygen Therapy  SpO2: 92 %  O2 Device: None (Room air)    Estimated body mass index is 26.28 kg/m² as calculated from the following:    Height as of this encounter: 5' 3\" (1.6 m). Weight as of this encounter: 148 lb 5.9 oz (67.3 kg). Intake/Output Summary (Last 24 hours) at 8/15/2022 1133  Last data filed at 8/15/2022 1020  Gross per 24 hour   Intake 240 ml   Output 15 ml   Net 225 ml           Physical Exam:    Blood pressure 127/72, pulse 93, temperature 97.7 °F (36.5 °C), temperature source Oral, resp. rate 18, height 5' 3\" (1.6 m), weight 148 lb 5.9 oz (67.3 kg), SpO2 92 %. General:    Elderly, no distress, alert , pleasant   Head:  Normocephalic, atraumatic  Eyes:  Sclerae appear normal.  Pupils equally round. ENT:  Nares appear normal, no drainage.   Dry  oral mucosa with thrush   Neck:  No restricted ROM. Trachea midline   CV:   RRR. No m/r/g. No jugular venous distension. No edema   Lungs:   CTAB. No wheezing, rhonchi, or rales. Symmetric expansion. Abdomen: Bowel sounds present. Soft, tender RLQ, nondistended. Extremities: No cyanosis or clubbing. No edema  Skin:     No rashes and normal coloration. Warm and  very dry. Neuro:   grossly intact. Psych:  Normal mood and affect. I have personally reviewed labs and tests showing:  Recent Labs:  Recent Results (from the past 24 hour(s))   POCT Glucose    Collection Time: 08/14/22  4:40 PM   Result Value Ref Range    POC Glucose 146 (H) 65 - 100 mg/dL    Performed by:  REAL Naidu    POCT Glucose    Collection Time: 08/14/22  8:09 PM   Result Value Ref Range    POC Glucose 166 (H) 65 - 100 mg/dL    Performed by: Sophia    CBC with Auto Differential    Collection Time: 08/15/22  4:25 AM   Result Value Ref Range    WBC 4.9 4.3 - 11.1 K/uL    RBC 2.30 (L) 4.05 - 5.2 M/uL    Hemoglobin 7.1 (L) 11.7 - 15.4 g/dL    Hematocrit 21.1 (L) 35.8 - 46.3 %    MCV 91.7 79.6 - 97.8 FL    MCH 30.9 26.1 - 32.9 PG    MCHC 33.6 31.4 - 35.0 g/dL    RDW 13.5 11.9 - 14.6 %    Platelets 661 (L) 275 - 450 K/uL    MPV 7.9 (L) 9.4 - 12.3 FL    nRBC 0.00 0.0 - 0.2 K/uL    Differential Type AUTOMATED      Seg Neutrophils 90 (H) 43 - 78 %    Lymphocytes 4 (L) 13 - 44 %    Monocytes 5 4.0 - 12.0 %    Eosinophils % 0 (L) 0.5 - 7.8 %    Basophils 0 0.0 - 2.0 %    Immature Granulocytes 2 0.0 - 5.0 %    Segs Absolute 4.4 1.7 - 8.2 K/UL    Absolute Lymph # 0.2 (L) 0.5 - 4.6 K/UL    Absolute Mono # 0.2 0.1 - 1.3 K/UL    Absolute Eos # 0.0 0.0 - 0.8 K/UL    Basophils Absolute 0.0 0.0 - 0.2 K/UL    Absolute Immature Granulocyte 0.1 0.0 - 0.5 K/UL   Basic Metabolic Panel    Collection Time: 08/15/22  4:25 AM   Result Value Ref Range    Sodium 133 (L) 136 - 145 mmol/L    Potassium 4.1 3.5 - 5.1 mmol/L    Chloride 106 98 - 107 mmol/L    CO2 21 21 - 32 mmol/L    Anion Gap 6 (L) 7 - 16 mmol/L    Glucose 260 (H) 65 - 100 mg/dL    BUN 13 8 - 23 MG/DL    Creatinine 1.10 (H) 0.6 - 1.0 MG/DL    GFR African American >60 >60 ml/min/1.73m2    GFR Non- 54 (L) >60 ml/min/1.73m2    Calcium 7.0 (L) 8.3 - 10.4 MG/DL   Magnesium    Collection Time: 08/15/22  4:25 AM   Result Value Ref Range    Magnesium 2.0 1.8 - 2.4 mg/dL   Phosphorus    Collection Time: 08/15/22  4:25 AM   Result Value Ref Range    Phosphorus 3.3 2.3 - 3.7 MG/DL   Triglyceride    Collection Time: 08/15/22  4:25 AM   Result Value Ref Range    Triglycerides 55 35 - 150 MG/DL   POCT Glucose    Collection Time: 08/15/22  7:26 AM   Result Value Ref Range    POC Glucose 221 (H) 65 - 100 mg/dL    Performed by: Humberto    POCT Glucose    Collection Time: 08/15/22 11:08 AM   Result Value Ref Range    POC Glucose 202 (H) 65 - 100 mg/dL    Performed by: David Radford        I have personally reviewed imaging studies showing:  No results found. Echocardiogram:  No results found for this or any previous visit.       Meds previously ordered:  Orders Placed This Encounter   Medications    lactated ringers bolus    ondansetron (ZOFRAN) injection 4 mg    morphine injection 2 mg    potassium chloride 10 mEq/100 mL IVPB (Peripheral Line)    buPROPion (WELLBUTRIN SR) extended release tablet 150 mg    pantoprazole (PROTONIX) tablet 40 mg    pravastatin (PRAVACHOL) tablet 80 mg    sertraline (ZOLOFT) tablet 100 mg    sodium chloride flush 0.9 % injection 5-40 mL    sodium chloride flush 0.9 % injection 5-40 mL    0.9 % sodium chloride infusion    OR Linked Order Group     ondansetron (ZOFRAN-ODT) disintegrating tablet 4 mg     ondansetron (ZOFRAN) injection 4 mg    polyethylene glycol (GLYCOLAX) packet 17 g    OR Linked Order Group     acetaminophen (TYLENOL) tablet 650 mg     acetaminophen (TYLENOL) suppository 650 mg    0.9 % sodium chloride infusion    nystatin (MYCOSTATIN) [id number] Question:   Antimicrobial Indications     Answer:   Infectious Diarrhea    HYDROmorphone HCl PF (DILAUDID) injection 1 mg    diatrizoate meglumine-sodium (GASTROGRAFIN) 66-10 % solution 15 mL    iopamidol (ISOVUE-370) 76 % injection 100 mL    PN-Adult Premix 4.25/5 - Peripheral Line    fat emulsion 20 % infusion 250 mL    thiamine (B-1) injection 100 mg    OR Linked Order Group     potassium chloride 20 mEq/50 mL IVPB (Central Line)     potassium chloride 10 mEq/100 mL IVPB (Peripheral Line)    magnesium sulfate 2000 mg in 50 mL IVPB premix    OR Linked Order Group     sodium phosphate 10 mmol in sodium chloride 0.9 % 250 mL IVPB     sodium phosphate 15 mmol in sodium chloride 0.9 % 250 mL IVPB     sodium phosphate 20 mmol in sodium chloride 0.9 % 500 mL IVPB    alteplase (CATHFLO) injection 1 mg    DISCONTD: magnesium sulfate 4000 mg in 100 mL IVPB premix    0.9 % sodium chloride infusion           Signed:  Monica Cordon MD    Part of this note may have been written by using a voice dictation software. The note has been proof read but may still contain some grammatical/other typographical errors.

## 2022-08-15 NOTE — PROGRESS NOTES
Accumulation:  No significant fluid accumulation     Strength:  Not Performed  Nutrition Assessment:  Nutrition History: Pt states she has been having poor po intake x3 weeks PTA consuming bites at meals. She reports taste changes, loss of appetite, and N/V/D. Pt reports ongoing wt loss of the past 2 years weighing 200lbs prior to wt loss. Per EMR, pt weighed 167lbs 6/8/21. Do You Have Any Cultural, Sikhism, or Ethnic Food Preferences?: No   Nutrition Background:   Pt with PMH notable for Crohn's disease of large and small bowel, GERD, HTN, OA, and CKD III. Pt presents d/t diarrhea, nausea, abdominal cramping and decreased po intake. Pt admitted with JULIETH and Crohn's flare. GI recommending TPN. Nutrition Interval:  PORT in place - accessed 8/14    Pt seen sitting in bed with TPN infusing per order. Pt states she didn't eat lunch yesterday and only took 3-4 bites of a hamburger compa at dinner. She reports the portion sizes are overwhelming and continues to report a mental block with eating. She reports drinking some of an Ensure Clear yesterday. Observed several bottles unopened on bedside table. This morning she reports consuming 50% of a bowl of frosted flakes and tolerating well at time of visit. Reiterated pt can order different foods with pt dining associates in order to receive less food and feel less overwhelmed at meals. IVF started by MD today.     Abdominal Status (last documented):   Last BM (including prior to admit): 08/13/22, GI Symptoms: Diarrhea 15ml documented in the last 24hrs  Pertinent Medications: wellbutrin, cipro, lomotil (4 x daily), ferrous sulfate, SSI (none required yesterday, 2 units thus far today), melatonin, solu-medrol, flagyl, nystatin, protonix, pravachol, thiamine (to complete 8/21)  Continuous: none  IVF: NS @ 50 ml/hr  PRN: dilaudid (last admin 8/15), morphine (last admin 8/13), zofran (last admin 8/13), tramadol (8/13)  Electrolyte Replacement: 8/14: magnesium Anthropometric Measures:  Height: 5' 3\" (160 cm)  Current Body Wt: 148 lb 5.9 oz (67.3 kg) (8/15), Weight source: Bed Scale  BMI: 26.3, Normal Weight (BMI 18.5-24. 9)  Admission Body Weight: 133 lb 13.1 oz (60.7 kg) (8/9; bed scale)  Ideal Body Weight (Kg) (Calculated): 52 kg (115 lbs), 116.4 %  Usual Body Wt: 167 lb (75.8 kg) (6/8/21; MD office visit), Percent weight change: -19.9       Edema:Peripheral Vascular  Peripheral Vascular (WDL): Within Defined Limits   BMI Category Normal Weight (BMI 18.5-24. 9)  Estimated Daily Nutrient Needs:  Energy (kcal/day): 8229-3658 (Kcal/kg (25-30) Weight used:   Current (60.7kg)  Protein (g/day): 61-79 (1-1.3 g/kg) Weight Used: (Current) 61 kg (8/10)  Fluid (ml/day):   (1 ml/kcal)    Nutrition Diagnosis:   Inadequate oral intake related to altered GI function, altered taste perception (loss of appetite, food preferences) as evidenced by  (pt reported barriers, po <25% needs, reports significant gi losses)  Moderate malnutrition, In context of chronic illness related to inadequate protein-energy intake as evidenced by Criteria as identified in malnutrition assessment  Nutrition Interventions:   Food and/or Nutrient Delivery: Continue Current Diet, Continue Current Parenteral Nutrition     Coordination of Nutrition Care: Continue to monitor while inpatient  Plan of Care discussed with: Dr. Joelle Lorenzo and Nasim Frank RN    Goals:   Previous Goal Met: Progressing toward Goal(s)  Active Goal: Tolerate nutrition support at goal rate (within 3 days)       Nutrition Monitoring and Evaluation:      Food/Nutrient Intake Outcomes: Food and Nutrient Intake, Parenteral Nutrition Intake/Tolerance  Physical Signs/Symptoms Outcomes: Biochemical Data, GI Status, Fluid Status or Edema, Meal Time Behavior, Weight    Discharge Planning:     Too soon to determine    Juan Jose Krueger MS, RDN, LD  Contact: [phone]

## 2022-08-15 NOTE — PROGRESS NOTES
Patient notified staff of BM, RN to assess. Small amount of BM in a large amount of urine. Less than 10mLs of BM in BSC. Will monitor.

## 2022-08-15 NOTE — PROGRESS NOTES
Patient resting in bed on room air. A&Ox4. Respirations even and unlabored. Patient denies pain and is in no acute distress at this time. Patient had two scant Bms throughout evening. PPN infusing through port, dressing c/d/I. No needs expressed. Call light within reach, will continue to monitor. Preparing to give report to oncoming RN.

## 2022-08-15 NOTE — PROGRESS NOTES
Pt resting in bed with  at bedside. Approximately 50cc of loose brown stool for this shift. No s/sx of distress noted at this time. Call light in reach, will monitor.

## 2022-08-15 NOTE — PROGRESS NOTES
Patient reminded to call for nursing staff if patient has a BM. Patient states there is a \"whole bin full in the bathroom\". This RN measured approximately 5mL of stool.

## 2022-08-16 LAB
ANION GAP SERPL CALC-SCNC: 5 MMOL/L (ref 7–16)
BUN SERPL-MCNC: 19 MG/DL (ref 8–23)
CALCIUM SERPL-MCNC: 7.3 MG/DL (ref 8.3–10.4)
CHLORIDE SERPL-SCNC: 107 MMOL/L (ref 98–107)
CO2 SERPL-SCNC: 21 MMOL/L (ref 21–32)
CREAT SERPL-MCNC: 1.1 MG/DL (ref 0.6–1)
GLUCOSE BLD STRIP.AUTO-MCNC: 137 MG/DL (ref 65–100)
GLUCOSE BLD STRIP.AUTO-MCNC: 205 MG/DL (ref 65–100)
GLUCOSE BLD STRIP.AUTO-MCNC: 214 MG/DL (ref 65–100)
GLUCOSE BLD STRIP.AUTO-MCNC: 238 MG/DL (ref 65–100)
GLUCOSE SERPL-MCNC: 273 MG/DL (ref 65–100)
MAGNESIUM SERPL-MCNC: 1.8 MG/DL (ref 1.8–2.4)
PHOSPHATE SERPL-MCNC: 4.8 MG/DL (ref 2.3–3.7)
POTASSIUM SERPL-SCNC: 5.5 MMOL/L (ref 3.5–5.1)
SERVICE CMNT-IMP: ABNORMAL
SODIUM SERPL-SCNC: 133 MMOL/L (ref 136–145)

## 2022-08-16 PROCEDURE — 82962 GLUCOSE BLOOD TEST: CPT

## 2022-08-16 PROCEDURE — 6360000002 HC RX W HCPCS: Performed by: INTERNAL MEDICINE

## 2022-08-16 PROCEDURE — 6370000000 HC RX 637 (ALT 250 FOR IP): Performed by: INTERNAL MEDICINE

## 2022-08-16 PROCEDURE — 83735 ASSAY OF MAGNESIUM: CPT

## 2022-08-16 PROCEDURE — 6370000000 HC RX 637 (ALT 250 FOR IP): Performed by: HOSPITALIST

## 2022-08-16 PROCEDURE — 36591 DRAW BLOOD OFF VENOUS DEVICE: CPT

## 2022-08-16 PROCEDURE — 2580000003 HC RX 258: Performed by: INTERNAL MEDICINE

## 2022-08-16 PROCEDURE — 1100000000 HC RM PRIVATE

## 2022-08-16 PROCEDURE — 80048 BASIC METABOLIC PNL TOTAL CA: CPT

## 2022-08-16 PROCEDURE — 2500000003 HC RX 250 WO HCPCS: Performed by: INTERNAL MEDICINE

## 2022-08-16 PROCEDURE — 6370000000 HC RX 637 (ALT 250 FOR IP): Performed by: PHYSICIAN ASSISTANT

## 2022-08-16 PROCEDURE — 84100 ASSAY OF PHOSPHORUS: CPT

## 2022-08-16 RX ORDER — HYDROMORPHONE HYDROCHLORIDE 2 MG/1
1 TABLET ORAL EVERY 6 HOURS PRN
Status: DISCONTINUED | OUTPATIENT
Start: 2022-08-16 | End: 2022-08-17

## 2022-08-16 RX ORDER — PREDNISONE 20 MG/1
60 TABLET ORAL DAILY
Status: DISCONTINUED | OUTPATIENT
Start: 2022-08-17 | End: 2022-08-19

## 2022-08-16 RX ORDER — CALCIUM GLUCONATE 20 MG/ML
1000 INJECTION, SOLUTION INTRAVENOUS ONCE
Status: COMPLETED | OUTPATIENT
Start: 2022-08-16 | End: 2022-08-16

## 2022-08-16 RX ORDER — DEXTROSE MONOHYDRATE 50 MG/ML
INJECTION, SOLUTION INTRAVENOUS CONTINUOUS
Status: ACTIVE | OUTPATIENT
Start: 2022-08-16 | End: 2022-08-16

## 2022-08-16 RX ADMIN — METRONIDAZOLE 250 MG: 500 TABLET ORAL at 21:16

## 2022-08-16 RX ADMIN — FERROUS SULFATE TAB 325 MG (65 MG ELEMENTAL FE) 325 MG: 325 (65 FE) TAB at 08:22

## 2022-08-16 RX ADMIN — ROPINIROLE HYDROCHLORIDE 0.5 MG: 0.5 TABLET, FILM COATED ORAL at 21:16

## 2022-08-16 RX ADMIN — BUPROPION HYDROCHLORIDE 150 MG: 150 TABLET, EXTENDED RELEASE ORAL at 08:21

## 2022-08-16 RX ADMIN — SODIUM CHLORIDE, PRESERVATIVE FREE 10 ML: 5 INJECTION INTRAVENOUS at 08:20

## 2022-08-16 RX ADMIN — HYOSCYAMINE SULFATE 125 MCG: 0.12 TABLET ORAL at 21:16

## 2022-08-16 RX ADMIN — CIPROFLOXACIN 250 MG: 500 TABLET, FILM COATED ORAL at 21:16

## 2022-08-16 RX ADMIN — DEXTROSE MONOHYDRATE: 50 INJECTION, SOLUTION INTRAVENOUS at 11:12

## 2022-08-16 RX ADMIN — DIPHENOXYLATE HYDROCHLORIDE AND ATROPINE SULFATE 1 TABLET: 2.5; .025 TABLET ORAL at 21:16

## 2022-08-16 RX ADMIN — METRONIDAZOLE 250 MG: 500 TABLET ORAL at 05:26

## 2022-08-16 RX ADMIN — THIAMINE HYDROCHLORIDE 100 MG: 100 INJECTION, SOLUTION INTRAMUSCULAR; INTRAVENOUS at 08:21

## 2022-08-16 RX ADMIN — HYOSCYAMINE SULFATE 125 MCG: 0.12 TABLET ORAL at 08:21

## 2022-08-16 RX ADMIN — DIPHENOXYLATE HYDROCHLORIDE AND ATROPINE SULFATE 1 TABLET: 2.5; .025 TABLET ORAL at 08:21

## 2022-08-16 RX ADMIN — DIPHENOXYLATE HYDROCHLORIDE AND ATROPINE SULFATE 1 TABLET: 2.5; .025 TABLET ORAL at 12:21

## 2022-08-16 RX ADMIN — CALCIUM GLUCONATE 1000 MG: 20 INJECTION, SOLUTION INTRAVENOUS at 08:52

## 2022-08-16 RX ADMIN — HYDROMORPHONE HYDROCHLORIDE 1 MG: 2 TABLET ORAL at 17:31

## 2022-08-16 RX ADMIN — INSULIN LISPRO 1 UNITS: 100 INJECTION, SOLUTION INTRAVENOUS; SUBCUTANEOUS at 12:20

## 2022-08-16 RX ADMIN — METHYLPREDNISOLONE SODIUM SUCCINATE 40 MG: 125 INJECTION, POWDER, FOR SOLUTION INTRAMUSCULAR; INTRAVENOUS at 08:19

## 2022-08-16 RX ADMIN — SERTRALINE HYDROCHLORIDE 100 MG: 25 TABLET ORAL at 08:20

## 2022-08-16 RX ADMIN — NYSTATIN 500000 UNITS: 100000 SUSPENSION ORAL at 12:20

## 2022-08-16 RX ADMIN — DIPHENOXYLATE HYDROCHLORIDE AND ATROPINE SULFATE 1 TABLET: 2.5; .025 TABLET ORAL at 17:02

## 2022-08-16 RX ADMIN — SODIUM CHLORIDE: 9 INJECTION, SOLUTION INTRAVENOUS at 05:51

## 2022-08-16 RX ADMIN — FERROUS SULFATE TAB 325 MG (65 MG ELEMENTAL FE) 325 MG: 325 (65 FE) TAB at 17:02

## 2022-08-16 RX ADMIN — MAGNESIUM SULFATE HEPTAHYDRATE: 500 INJECTION, SOLUTION INTRAMUSCULAR; INTRAVENOUS at 18:08

## 2022-08-16 RX ADMIN — PRAVASTATIN SODIUM 80 MG: 80 TABLET ORAL at 21:16

## 2022-08-16 RX ADMIN — ONDANSETRON 4 MG: 2 INJECTION INTRAMUSCULAR; INTRAVENOUS at 19:17

## 2022-08-16 RX ADMIN — NYSTATIN 500000 UNITS: 100000 SUSPENSION ORAL at 17:02

## 2022-08-16 RX ADMIN — HYDROMORPHONE HYDROCHLORIDE 1 MG: 2 TABLET ORAL at 11:34

## 2022-08-16 RX ADMIN — HYDROMORPHONE HYDROCHLORIDE 1 MG: 1 INJECTION, SOLUTION INTRAMUSCULAR; INTRAVENOUS; SUBCUTANEOUS at 06:04

## 2022-08-16 RX ADMIN — SODIUM ZIRCONIUM CYCLOSILICATE 10 G: 10 POWDER, FOR SUSPENSION ORAL at 08:18

## 2022-08-16 RX ADMIN — NYSTATIN 500000 UNITS: 100000 SUSPENSION ORAL at 08:20

## 2022-08-16 RX ADMIN — INSULIN LISPRO 1 UNITS: 100 INJECTION, SOLUTION INTRAVENOUS; SUBCUTANEOUS at 17:02

## 2022-08-16 RX ADMIN — INSULIN LISPRO 1 UNITS: 100 INJECTION, SOLUTION INTRAVENOUS; SUBCUTANEOUS at 08:23

## 2022-08-16 RX ADMIN — METRONIDAZOLE 250 MG: 500 TABLET ORAL at 12:21

## 2022-08-16 RX ADMIN — PANTOPRAZOLE SODIUM 40 MG: 40 TABLET, DELAYED RELEASE ORAL at 05:27

## 2022-08-16 RX ADMIN — HYOSCYAMINE SULFATE 125 MCG: 0.12 TABLET ORAL at 12:21

## 2022-08-16 RX ADMIN — NYSTATIN 500000 UNITS: 100000 SUSPENSION ORAL at 21:16

## 2022-08-16 RX ADMIN — Medication 9 MG: at 21:16

## 2022-08-16 RX ADMIN — CIPROFLOXACIN 250 MG: 500 TABLET, FILM COATED ORAL at 08:21

## 2022-08-16 ASSESSMENT — PAIN SCALES - GENERAL
PAINLEVEL_OUTOF10: 4
PAINLEVEL_OUTOF10: 6
PAINLEVEL_OUTOF10: 9
PAINLEVEL_OUTOF10: 8

## 2022-08-16 ASSESSMENT — PAIN DESCRIPTION - LOCATION
LOCATION: ABDOMEN

## 2022-08-16 ASSESSMENT — PAIN DESCRIPTION - ORIENTATION
ORIENTATION: MID
ORIENTATION: LEFT
ORIENTATION: MID

## 2022-08-16 ASSESSMENT — PAIN DESCRIPTION - DESCRIPTORS
DESCRIPTORS: ACHING
DESCRIPTORS: GNAWING;ACHING

## 2022-08-16 NOTE — PROGRESS NOTES
Pt resting in bed with friend at bedside. No diarrhea for this shift. Good po intake for today. No s/sx of pain or distress at this time. Pt encouraged to call for assistance if needed call light in reach, will monitor.

## 2022-08-16 NOTE — PROGRESS NOTES
Pt resting in bed with eyes closed. Pt reports feeling better this am, pt reports pain well controlled at this time. Approximately 20cc or loose brown stool noted during the night. Pt on RA. Pt encouraged to call for assistance if needed call light in reach will monitor.

## 2022-08-16 NOTE — PROGRESS NOTES
Comprehensive Nutrition Assessment    Type and Reason for Visit: Reassess  Malnutrition Screening Tool: Malnutrition Screen  Have you recently lost weight without trying?: 14 to 23 pounds (2 points)  Have you been eating poorly because of a decreased appetite?: Yes (1 point)  Malnutrition Screening Tool Score: 3  TPN consult (Hospitalist)  Nutrition Recommendations/Plan:   Meals and Snacks:  Diet: Continue current order  Pt previously encouraged pt to have family/spouse provide preferred meals/food to increase kcal and protein intake during the day  Nutrition Supplement Therapy:  Medical food supplement therapy:  Change Ensure Clear to Ensure High Protein three times per day (this provides 160 kcal and 16 grams protein per bottle)    Parenteral Nutrition:  D/C TPN from 8/15 d/t hyperkalemia. Infusing D5 @ 85ml/hr until 1759 per discussion with Dr. Leodan Peterson via [facility] parenteral nutrition  new bag to begin at 1800 today  Continue: Dex 5%, 4.25% AA 2 L (85ml/hr)   Discontinue 250 ml 20% lipids daily  Lytes/L:   Sodium ( 70 meq NaCl), Potassium (KCl not available for inclusion in TPN at this time secondary to national shortages) Phosphorus (remove), Calcium (4.5 meq), Magnesium 8 meq   Other additives:   MVI Monday Wednesday Friday due to national shortages, MTE  Nutrition Related Medication Management: Thiamine  mg x 7 days   Labs:   BMP daily  Mg daily x 3 days then MWF  Phos daily x 3 days then MWF    POC Glucoses/SSI Active    If pt continues to be unable to meet at least 50% of estimated nutrition needs, consider placement of NGT for short term nutrition needs as pt has had decreased stool output. Malnutrition Assessment:  Malnutrition Status:  Moderate malnutrition  Context: Chronic Illness  Findings of clinical characteristics of malnutrition:   Energy Intake:  Mild decrease in energy intake (Comment) (bites at meals x3 weeks)  Weight Loss:  Mild weight loss (specify amount and time period) (20% wt loss since 6/2021)     Body Fat Loss:  Mild body fat loss Triceps, Fat Overlying Ribs   Muscle Mass Loss:  Mild muscle mass loss Clavicles (pectoralis & deltoids), Thigh (quadraceps), Calf (gastrocnemius), Scapula (trapezius), Hand (interosseous)  Fluid Accumulation:  No significant fluid accumulation     Strength:  Not Performed  Nutrition Assessment:  Nutrition History: Pt states she has been having poor po intake x3 weeks PTA consuming bites at meals. She reports taste changes, loss of appetite, and N/V/D. Pt reports ongoing wt loss of the past 2 years weighing 200lbs prior to wt loss. Per EMR, pt weighed 167lbs 6/8/21. Do You Have Any Cultural, Islam, or Ethnic Food Preferences?: No   Nutrition Background:   Pt with PMH notable for Crohn's disease of large and small bowel, GERD, HTN, OA, and CKD III. Pt presents d/t diarrhea, nausea, abdominal cramping and decreased po intake. Pt admitted with JULIETH and Crohn's flare. GI recommending TPN. Nutrition Interval:  PORT in place - accessed 8/14    Pt discussed with PRICILA Pagan. D/Cing current TPN d/t hyperkalemia. Noted stool output of 100ml over the past 24hrs per flowsheets. Pt reports she is now taking 7-8 bites at meals. She reports her  brought her a baconator from [facility] yesterday and she took 2 bites before disposing of it and states she didn't want to finish it d/t it not being \"healthy\". Per flowsheet documentation, pt ate 51-75% of lunch yesterday and 26-50% of dinner last night. Pt states she had chicken pot pie for lunch (chicken marsala noted to be ordered per meal ordering program) that she didn't eat any of and she ate 25% of pasta and a bite of a meatball. Pt reports she hasn't been drinking Ensure Clear d/t unspecified reasons. She reports she continues to not have an appetite and interruptions during the day for pt care from staff as her main barriers to intake.  Discussed stopping lipids this evening in order to increase hunger cues and encouraged pt to try drinking Ensure between meals as discussed previously on 8/12. Pt states she did drink 50% of Ensure High Protein that was provided 8/12 and is agreeable to changing Ensure Clear to High Protein to increase nutrition intake during the day. Discussed stopping TPN d/t current potassium level and providing D5 as stated above. Pt verbalized understanding. Continuing TPN per GI notes. Abdominal Status (last documented):   Last BM (including prior to admit): 08/15/22, GI Symptoms: Diarrhea  Pertinent Medications: wellbutrin, cipro, lomotil (4 x daily), ferrous sulfate, SSI (2 units yesterday, 1 unit thus far today), melatonin, solu-medrol (ended 8/16; received 1 dose this AM), flagyl, nystatin, protonix, pravachol, thiamine (to complete 8/21); lokelma (1 dose) and calcium gluconate  Continuous: none  IVF: D5 @ 85 ml/hr  PRN: dilaudid (last admin 8/16), morphine (last admin 8/13), zofran (last admin 8/13), tramadol (8/13)  Electrolyte Replacement: 8/14: magnesium sulfate 2gm x2, sodium phos 15mmol   Pertinent Labs:   Lab Results   Component Value Date/Time     08/16/2022 05:59 AM    K 5.5 08/16/2022 05:59 AM     08/16/2022 05:59 AM    CO2 21 08/16/2022 05:59 AM    BUN 19 08/16/2022 05:59 AM    CREATININE 1.10 08/16/2022 05:59 AM    GLUCOSE 273 08/16/2022 05:59 AM    CALCIUM 7.3 08/16/2022 05:59 AM    PHOS 4.8 08/16/2022 05:59 AM    MG 1.8 08/16/2022 05:59 AM      Lab Results   Component Value Date/Time    POCGLU 205 08/16/2022 07:21 AM    POCGLU 165 08/15/2022 08:43 PM    POCGLU 195 08/15/2022 04:17 PM    POCGLU 202 08/15/2022 11:08 AM    POCGLU 221 08/15/2022 07:26 AM    POCGLU 166 08/14/2022 08:09 PM       Labs reviewed and remarkable for unchanged sodium. Hyperkalemic today. Noted decrease in stool output and pt states her intake is improving. Phos elevated today as well. Removing both from TPN this evening.  Mg decreased overnight and now low normal. Pt remain and/or Nutrient Delivery: Continue Current Diet, Modify Oral Nutrition Supplement, Modify Parenteral Nutrition     Coordination of Nutrition Care: Continue to monitor while inpatient  Plan of Care discussed with: Dr. Berlin Craig via Patrick Burkett RN    Goals:   Previous Goal Met: Progressing toward Goal(s)  Active Goal: Tolerate nutrition support at goal rate (within 3 days)       Nutrition Monitoring and Evaluation:      Food/Nutrient Intake Outcomes: Food and Nutrient Intake, Supplement Intake, Parenteral Nutrition Intake/Tolerance  Physical Signs/Symptoms Outcomes: Biochemical Data, GI Status, Fluid Status or Edema, Meal Time Behavior, Weight    Discharge Planning:     Too soon to determine    Elvira Winters MS, RDN, LD  Contact: [phone]

## 2022-08-16 NOTE — PROGRESS NOTES
[facility] Progress Note         Admit Date:  8/8/2022    Today's Date:  8/16/2022    CC:  Crohn's disease, abdominal pain, diarrhea    Subjective:     Patient : Less diarrhea and pain for the last 48 hours per patient report. Tolerating some oral intake. Nausea resolved with Zofran.  Abdominal pain relieved with Dilaudid    Medications:   Current Facility-Administered Medications   Medication Dose Route Frequency    HYDROmorphone (DILAUDID) tablet 1 mg  1 mg Oral Q6H PRN    dextrose 5 % solution   IntraVENous Continuous    fat emulsion 20 % infusion 250 mL  250 mL IntraVENous Daily    thiamine (B-1) injection 100 mg  100 mg IntraVENous Daily    potassium chloride 20 mEq/50 mL IVPB (Central Line)  20 mEq IntraVENous PRN    Or    potassium chloride 10 mEq/100 mL IVPB (Peripheral Line)  10 mEq IntraVENous PRN    magnesium sulfate 2000 mg in 50 mL IVPB premix  2,000 mg IntraVENous PRN    sodium phosphate 10 mmol in sodium chloride 0.9 % 250 mL IVPB  10 mmol IntraVENous PRN    Or    sodium phosphate 15 mmol in sodium chloride 0.9 % 250 mL IVPB  15 mmol IntraVENous PRN    Or    sodium phosphate 20 mmol in sodium chloride 0.9 % 500 mL IVPB  20 mmol IntraVENous PRN    ciprofloxacin (CIPRO) tablet 250 mg  250 mg Oral 2 times per day    metroNIDAZOLE (FLAGYL) tablet 250 mg  250 mg Oral 3 times per day    HYDROmorphone HCl PF (DILAUDID) injection 1 mg  1 mg IntraVENous Q4H PRN    diatrizoate meglumine-sodium (GASTROGRAFIN) 66-10 % solution 15 mL  15 mL Oral ONCE PRN    methylPREDNISolone sodium (SOLU-MEDROL) injection 40 mg  40 mg IntraVENous Q12H    ferrous sulfate (IRON 325) tablet 325 mg  325 mg Oral BID WC    melatonin tablet 9 mg  9 mg Oral Nightly    traMADol (ULTRAM) tablet 50 mg  50 mg Oral Q6H PRN    Or    traMADol (ULTRAM) tablet 100 mg  100 mg Oral Q6H PRN    insulin lispro (HUMALOG) injection vial 0-4 Units  0-4 Units SubCUTAneous TID WC    insulin lispro (HUMALOG) injection vial 0-4 Units  0-4 Units SubCUTAneous Nightly    glucose chewable tablet 16 g  4 tablet Oral PRN    dextrose bolus 10% 125 mL  125 mL IntraVENous PRN    Or    dextrose bolus 10% 250 mL  250 mL IntraVENous PRN    glucagon (rDNA) injection 1 mg  1 mg SubCUTAneous PRN    dextrose 10 % infusion   IntraVENous Continuous PRN    hyoscyamine (LEVSIN/SL) sublingual tablet 125 mcg  125 mcg SubLINGual TID    diphenoxylate-atropine (LOMOTIL) 2.5-0.025 MG per tablet 1 tablet  1 tablet Oral 4x Daily    buPROPion (WELLBUTRIN SR) extended release tablet 150 mg  150 mg Oral QAM    pantoprazole (PROTONIX) tablet 40 mg  40 mg Oral QAM AC    pravastatin (PRAVACHOL) tablet 80 mg  80 mg Oral Nightly    sertraline (ZOLOFT) tablet 100 mg  100 mg Oral Daily    sodium chloride flush 0.9 % injection 5-40 mL  5-40 mL IntraVENous 2 times per day    sodium chloride flush 0.9 % injection 5-40 mL  5-40 mL IntraVENous PRN    0.9 % sodium chloride infusion   IntraVENous PRN    ondansetron (ZOFRAN-ODT) disintegrating tablet 4 mg  4 mg Oral Q8H PRN    Or    ondansetron (ZOFRAN) injection 4 mg  4 mg IntraVENous Q6H PRN    polyethylene glycol (GLYCOLAX) packet 17 g  17 g Oral Daily PRN    acetaminophen (TYLENOL) tablet 650 mg  650 mg Oral Q6H PRN    Or    acetaminophen (TYLENOL) suppository 650 mg  650 mg Rectal Q6H PRN    nystatin (MYCOSTATIN) 661183 UNIT/ML suspension 500,000 Units  5 mL Oral 4x Daily    morphine injection 2 mg  2 mg IntraVENous Q4H PRN    rOPINIRole (REQUIP) tablet 0.5 mg  0.5 mg Oral Nightly       Review of Systems:  ROS was obtained, with pertinent positives as listed above. No chest pain or SOB. Diet:  regular diet    Objective:   Vitals:  /74   Pulse 83   Temp 97.7 °F (36.5 °C) (Oral)   Resp 19   Ht 5' 3\" (1.6 m)   Wt 149 lb 7.6 oz (67.8 kg)   SpO2 93%   BMI 26.48 kg/m²   Intake/Output:  08/16 0701 - 08/16 1900  In: 220 [P.O.:220]  Out: -   08/14 1901 - 08/16 0700  In: 4211 [P.O.:720;  I.V.:3224]  Out: 3726 [Urine:2950]  Exam:  General appearance: alert, cooperative, no distress  Lungs: clear to auscultation bilaterally anteriorly  Heart: regular rate and rhythm  Abdomen: soft, mildly tender periumbilical . Bowel sounds normal. No masses, no organomegaly  Extremities: extremities normal, atraumatic, no cyanosis or edema  Neuro:  alert and oriented    Data Review (Labs):    Recent Labs     08/13/22  1305 08/14/22  0302 08/15/22  0425 08/16/22  0559   WBC 4.5 4.4 4.9  --    HGB 7.6* 7.2* 7.1*  --    HCT 22.8* 21.5* 21.1*  --    * 124* 122*  --    MCV 91.9 91.1 91.7  --    * 132* 133* 133*   K 3.8 3.6 4.1 5.5*    105 106 107   CO2 20* 21 21 21   BUN 13 11 13 19   CREATININE 1.00 1.00 1.10* 1.10*   CALCIUM 7.1* 7.0* 7.0* 7.3*   MG  --  0.9* 2.0 1.8   PHOS  --  1.6* 3.3 4.8*   GLUCOSE 95 100 260* 273*       Assessment:     Principal Problem:    JULIETH (acute kidney injury) ([facility] [address]  Active Problems:    Crohn's disease (HCC)    Hyponatremia    Hypertension    Hyperlipidemia    Depression    Moderate protein-calorie malnutrition (HCC)    Iron deficiency anemia due to chronic blood loss    RLS (restless legs syndrome)    CKD (chronic kidney disease) stage 3, GFR 30-59 ml/min ([facility])    Acute hypokalemia    Colitis    Anemia    Essential hypertension, benign  Resolved Problems:    * No resolved hospital problems. *  65 y.o. female with PMH including but not limited to crohns disease (lg and sm bowel), HTN, CKD3, depression, HLP, RLS, who we are following for Crohn's flare with abdominal cramping, nausea, rectal spasm, non-bloody diarrhea, dehydration, and wt loss. She has failed Remicade, Humira, Entyvio, Stelara. Currenlty on Emy Corolla but not in remission. Recent Cdiff neg, Calprotectin >1200 and CT evidence of bowel thickening cw CD. Has electrolyte imbalance and JULIETH on admission.      8/10/22:  Renal function improved  Sxs better  Mag and k low  Iron low     8/11/22  Almost ready for discharge  Cont ivf for JULIETH  Switch to prednisone  Tramadol prn     8/12/22  Did not tolerate po switch  Afraid if she goes home she will be right back  Creat better  Does not like to take pain meds     8/13/22  Not any better  Need to consider TPN  There is a stool organism being evaluated  Last CT w/o contrast  Alb 2.0     8/14/22  she is better with iv steroids but having some side effects such as shaking  There is still a pending stool culture hidden under the results tab  On cipro / flagyl/ and steroids  If she does not respond she may need a colectomy and this was discussed with her  Hx of SB disease also so may need to get a SBFT    8/16/2022. Abdominal pain and diarrhea improving. Tolerating some po intake, but remains on TPN. Stool culture final result negative. Plan:     -Change IV to po steroids with prednisone 60mg daily  -Continue TPN, but encouraged her to continue to increase her oral intake  -Monitor stool frequency  -hopefully home soon  -Keep appt for outpatient colonoscopy with Dr. Verner Grana on 8/30/2022  Edis Carrera.  Will Schwarz in collaboration with Dr. Gary Guidry  [facility]

## 2022-08-16 NOTE — PROGRESS NOTES
Report received from [facility], Rn. No distress. Has PPN infusing. Instructed pt to call should needs arise. Pt voiced understanding. Call light within reach.

## 2022-08-16 NOTE — PROGRESS NOTES
Hospitalist Progress Note   Admit Date:  2022  3:17 PM   Name:  Charity Garza   Age:  65 y.o. Sex:  female  :  1960   MRN:  [id number]   Room:  2/    Presenting Complaint: Nausea     Reason(s) for Admission: Dehydration [E86.0]  Hypokalemia [E87.6]  JULIETH (acute kidney injury) ([facility] [address] [N17.9]  Diarrhea, unspecified type [R19.7]  Acute renal failure superimposed on chronic kidney disease, unspecified CKD stage, unspecified acute renal failure type ([address] [N17.9, N18.9]     Hospital Course & Interval History:     Please refer to the admission H&P for details of presentation. In summary, Charity Garza is a 65 y.o. female with medical history significant for crohns disease, HTN, CKD3, depression, HLP who was admitted for several weeks of nausea, emesis and abdominal pain. GI was consulted due to concerns for crohns flare up. Patient was started on IV steroids. Patient symptoms improved initially but worsened after transitioning to PO steroids. CT A/P on  was done due to continued abdominal pain. It showed circumferential wall thickening of colon possibly suggestive of colitis, therefore she was started on cipro and flagyl. Patient also had multiple bouts of diarrhea but was unable to be verified by staff. Stool studies including C. difficile has been negative. Subjective/24 hr Events (22) : Patient is seen and examined at bedside. No acute events reported overnight by nursing staff. Patient reports 3-4 episodes of bowel movements overnight. Bowel movements were noted to be about 20 cc per measurement per staff. She reports improvement in abdominal pain Per MAR, patient has been requesting IV Dilaudid every 4 hours. Patient was able to tolerate p.o. diet this morning without any worsening abdominal pain. Patient is agreeable to trying p.o. Dilaudid in an attempt to transition from IV pain medication. Awaiting GIs input regarding possible EGD/colonoscopy.     Patient denies fever, chills, chest pains, shortness of breath, n/v.    Review of Systems: 10 point review of systems is otherwise negative with the exception of the elements mentioned above.'        Assessment & Plan:     Crohn's disease with flare  Colitis  Possible crohns flare up. Stool studies have been neg including c.diff. CT A/P on 8/13 with findings suggestive of colitis  08/16/22: Reports improvement in abdominal pain although currently on IV every 4 hours pain medication. 3 episodes of emesis overnight but was only 20 cc in total per nursing staff. Patient is agreeable to reducing IV pain medication and transitioning to p.o. pain medication. -GI recommendations appreciated. Pending possible EGD/colonoscopy. -IV pain medication. Added p.o. Dilaudid. Encourage patient to use p.o. pain medication  -Continue to monitor patient's bowel movement outputs. -Continue with Cipro and Flagyl(8/13 - . . )  -Continue Lomotil  -Prednisone 60 mg daily  -dc PPN and start on D5 @85% for now. May dc D5IVF tomrrow if continues to have good PO intake    JULIETH on CKD3  Creatinine peaked at 3.80 on 8/8 08/16/22: Cr now at 1.10.  - encouraged PO intake. - will dc fluids for now  - monitor renal function      Hyperkalemia   K+ of 5.5.  - give Ca-gluconate and lokelma  - recheck K+    HTN  // HLD  08/16/22:  BP normal off home antiHTN meds. Continue to hold and restart as needed  - continue with home statin    Depression: continue with home wellbutrin and zoloft  RLS: continue with requip      Hyponatremia  Na of 127 on admission and now up to 133  - encouraged PO intake    Moderate Protein Calorie Malnutrition  - nutrition on board    Anemia, iron deficiency  - now on PO iron    Discharge Planning:  pending improvement of GI symptoms. Diet:  ADULT DIET;  Regular  ADULT ORAL NUTRITION SUPPLEMENT; Breakfast, Lunch, Dinner; Clear Liquid Oral Supplement  DVT PPx: SCDs  Code status: Full Code        I have personally reviewed and drainage. Moist oral mucosa  Neck:  No restricted ROM. Trachea midline   CV:   RRR. No m/r/g. No jugular venous distension. Lungs:   CTAB. No wheezing. Symmetric expansion. Abdomen: Bowel sounds present. Soft, slight TTP to palpation (more of soreness), nondistended. Extremities: No cyanosis or clubbing. No edema  Skin:     No rashes and normal coloration. Warm and dry. Neuro:  CN II-XII grossly intact. A&Ox3  Psych:  Normal mood and affect. I have personally reviewed labs and tests showing:  Recent Labs:  Recent Results (from the past 48 hour(s))   POCT Glucose    Collection Time: 08/14/22  4:40 PM   Result Value Ref Range    POC Glucose 146 (H) 65 - 100 mg/dL    Performed by:  REAL Naidu    POCT Glucose    Collection Time: 08/14/22  8:09 PM   Result Value Ref Range    POC Glucose 166 (H) 65 - 100 mg/dL    Performed by: Sophia    CBC with Auto Differential    Collection Time: 08/15/22  4:25 AM   Result Value Ref Range    WBC 4.9 4.3 - 11.1 K/uL    RBC 2.30 (L) 4.05 - 5.2 M/uL    Hemoglobin 7.1 (L) 11.7 - 15.4 g/dL    Hematocrit 21.1 (L) 35.8 - 46.3 %    MCV 91.7 79.6 - 97.8 FL    MCH 30.9 26.1 - 32.9 PG    MCHC 33.6 31.4 - 35.0 g/dL    RDW 13.5 11.9 - 14.6 %    Platelets 652 (L) 973 - 450 K/uL    MPV 7.9 (L) 9.4 - 12.3 FL    nRBC 0.00 0.0 - 0.2 K/uL    Differential Type AUTOMATED      Seg Neutrophils 90 (H) 43 - 78 %    Lymphocytes 4 (L) 13 - 44 %    Monocytes 5 4.0 - 12.0 %    Eosinophils % 0 (L) 0.5 - 7.8 %    Basophils 0 0.0 - 2.0 %    Immature Granulocytes 2 0.0 - 5.0 %    Segs Absolute 4.4 1.7 - 8.2 K/UL    Absolute Lymph # 0.2 (L) 0.5 - 4.6 K/UL    Absolute Mono # 0.2 0.1 - 1.3 K/UL    Absolute Eos # 0.0 0.0 - 0.8 K/UL    Basophils Absolute 0.0 0.0 - 0.2 K/UL    Absolute Immature Granulocyte 0.1 0.0 - 0.5 K/UL   Basic Metabolic Panel    Collection Time: 08/15/22  4:25 AM   Result Value Ref Range    Sodium 133 (L) 136 - 145 mmol/L    Potassium 4.1 3.5 - 5.1 mmol/L Chloride 106 98 - 107 mmol/L    CO2 21 21 - 32 mmol/L    Anion Gap 6 (L) 7 - 16 mmol/L    Glucose 260 (H) 65 - 100 mg/dL    BUN 13 8 - 23 MG/DL    Creatinine 1.10 (H) 0.6 - 1.0 MG/DL    GFR African American >60 >60 ml/min/1.73m2    GFR Non- 54 (L) >60 ml/min/1.73m2    Calcium 7.0 (L) 8.3 - 10.4 MG/DL   Magnesium    Collection Time: 08/15/22  4:25 AM   Result Value Ref Range    Magnesium 2.0 1.8 - 2.4 mg/dL   Phosphorus    Collection Time: 08/15/22  4:25 AM   Result Value Ref Range    Phosphorus 3.3 2.3 - 3.7 MG/DL   Triglyceride    Collection Time: 08/15/22  4:25 AM   Result Value Ref Range    Triglycerides 55 35 - 150 MG/DL   POCT Glucose    Collection Time: 08/15/22  7:26 AM   Result Value Ref Range    POC Glucose 221 (H) 65 - 100 mg/dL    Performed by: DuganSusanCNA    POCT Glucose    Collection Time: 08/15/22 11:08 AM   Result Value Ref Range    POC Glucose 202 (H) 65 - 100 mg/dL    Performed by: DuganSusanCNA    POCT Glucose    Collection Time: 08/15/22  4:17 PM   Result Value Ref Range    POC Glucose 195 (H) 65 - 100 mg/dL    Performed by: DuganSusanCNA    POCT Glucose    Collection Time: 08/15/22  8:43 PM   Result Value Ref Range    POC Glucose 165 (H) 65 - 100 mg/dL    Performed by:  Toscano (Elaine)BSN    Basic Metabolic Panel    Collection Time: 08/16/22  5:59 AM   Result Value Ref Range    Sodium 133 (L) 136 - 145 mmol/L    Potassium 5.5 (H) 3.5 - 5.1 mmol/L    Chloride 107 98 - 107 mmol/L    CO2 21 21 - 32 mmol/L    Anion Gap 5 (L) 7 - 16 mmol/L    Glucose 273 (H) 65 - 100 mg/dL    BUN 19 8 - 23 MG/DL    Creatinine 1.10 (H) 0.6 - 1.0 MG/DL    GFR African American >60 >60 ml/min/1.73m2    GFR Non- 54 (L) >60 ml/min/1.73m2    Calcium 7.3 (L) 8.3 - 10.4 MG/DL   Magnesium    Collection Time: 08/16/22  5:59 AM   Result Value Ref Range    Magnesium 1.8 1.8 - 2.4 mg/dL   Phosphorus    Collection Time: 08/16/22  5:59 AM   Result Value Ref Range    Phosphorus 4.8 (H) 2.3 - 3.7 MG/DL   POCT Glucose    Collection Time: 08/16/22  7:21 AM   Result Value Ref Range    POC Glucose 205 (H) 65 - 100 mg/dL    Performed by: Joseph        I have personally reviewed imaging studies showing: Other Studies:  CT ABDOMEN PELVIS W IV CONTRAST Additional Contrast? Oral   Final Result   1. Circumferential wall thickening over a long segment of colon involving the   distal transverse, descending and sigmoid colon. The apparent thickening may be   accentuated by incomplete luminal distention. Colitis could be considered in the   setting of abdominal pain. 2. Hydropic gallbladder, no gallstones or obvious gallbladder wall thickening. CT ABDOMEN PELVIS RENAL STONE Additional Contrast? None   Final Result   1. No acute abdominal or pelvic abnormality noted on this limited examination   without the benefit of intravenous or oral contrast. Specifically, no renal or   ureteral stones present. There is no hydronephrosis or hydroureter. US RETROPERITONEAL COMPLETE   Final Result      Mild right hydronephrosis, of uncertain etiology. Otherwise, unremarkable   ultrasound of the kidneys.                 Current Meds:  Current Facility-Administered Medications   Medication Dose Route Frequency    HYDROmorphone (DILAUDID) tablet 1 mg  1 mg Oral Q6H PRN    dextrose 5 % solution   IntraVENous Continuous    fat emulsion 20 % infusion 250 mL  250 mL IntraVENous Daily    thiamine (B-1) injection 100 mg  100 mg IntraVENous Daily    potassium chloride 20 mEq/50 mL IVPB (Central Line)  20 mEq IntraVENous PRN    Or    potassium chloride 10 mEq/100 mL IVPB (Peripheral Line)  10 mEq IntraVENous PRN    magnesium sulfate 2000 mg in 50 mL IVPB premix  2,000 mg IntraVENous PRN    sodium phosphate 10 mmol in sodium chloride 0.9 % 250 mL IVPB  10 mmol IntraVENous PRN    Or    sodium phosphate 15 mmol in sodium chloride 0.9 % 250 mL IVPB  15 mmol IntraVENous PRN    Or    sodium phosphate 20 mmol in sodium chloride 0.9 % 500 mL IVPB  20 mmol IntraVENous PRN    ciprofloxacin (CIPRO) tablet 250 mg  250 mg Oral 2 times per day    metroNIDAZOLE (FLAGYL) tablet 250 mg  250 mg Oral 3 times per day    HYDROmorphone HCl PF (DILAUDID) injection 1 mg  1 mg IntraVENous Q4H PRN    diatrizoate meglumine-sodium (GASTROGRAFIN) 66-10 % solution 15 mL  15 mL Oral ONCE PRN    methylPREDNISolone sodium (SOLU-MEDROL) injection 40 mg  40 mg IntraVENous Q12H    ferrous sulfate (IRON 325) tablet 325 mg  325 mg Oral BID WC    melatonin tablet 9 mg  9 mg Oral Nightly    traMADol (ULTRAM) tablet 50 mg  50 mg Oral Q6H PRN    Or    traMADol (ULTRAM) tablet 100 mg  100 mg Oral Q6H PRN    insulin lispro (HUMALOG) injection vial 0-4 Units  0-4 Units SubCUTAneous TID WC    insulin lispro (HUMALOG) injection vial 0-4 Units  0-4 Units SubCUTAneous Nightly    glucose chewable tablet 16 g  4 tablet Oral PRN    dextrose bolus 10% 125 mL  125 mL IntraVENous PRN    Or    dextrose bolus 10% 250 mL  250 mL IntraVENous PRN    glucagon (rDNA) injection 1 mg  1 mg SubCUTAneous PRN    dextrose 10 % infusion   IntraVENous Continuous PRN    hyoscyamine (LEVSIN/SL) sublingual tablet 125 mcg  125 mcg SubLINGual TID    diphenoxylate-atropine (LOMOTIL) 2.5-0.025 MG per tablet 1 tablet  1 tablet Oral 4x Daily    buPROPion (WELLBUTRIN SR) extended release tablet 150 mg  150 mg Oral QAM    pantoprazole (PROTONIX) tablet 40 mg  40 mg Oral QAM AC    pravastatin (PRAVACHOL) tablet 80 mg  80 mg Oral Nightly    sertraline (ZOLOFT) tablet 100 mg  100 mg Oral Daily    sodium chloride flush 0.9 % injection 5-40 mL  5-40 mL IntraVENous 2 times per day    sodium chloride flush 0.9 % injection 5-40 mL  5-40 mL IntraVENous PRN    0.9 % sodium chloride infusion   IntraVENous PRN    ondansetron (ZOFRAN-ODT) disintegrating tablet 4 mg  4 mg Oral Q8H PRN    Or    ondansetron (ZOFRAN) injection 4 mg  4 mg IntraVENous Q6H PRN    polyethylene glycol (GLYCOLAX) packet 17 g  17 g Oral Daily PRN    acetaminophen (TYLENOL) tablet 650 mg  650 mg Oral Q6H PRN    Or    acetaminophen (TYLENOL) suppository 650 mg  650 mg Rectal Q6H PRN    nystatin (MYCOSTATIN) 297339 UNIT/ML suspension 500,000 Units  5 mL Oral 4x Daily    morphine injection 2 mg  2 mg IntraVENous Q4H PRN    rOPINIRole (REQUIP) tablet 0.5 mg  0.5 mg Oral Nightly       Signed:  Emanuel Tate MD    Part of this note may have been written by using a voice dictation software. The note has been proof read but may still contain some grammatical/other typographical errors.

## 2022-08-17 PROBLEM — E87.5 HYPERKALEMIA: Status: RESOLVED | Noted: 2022-08-17 | Resolved: 2022-08-17

## 2022-08-17 PROBLEM — E87.5 HYPERKALEMIA: Status: ACTIVE | Noted: 2022-08-17

## 2022-08-17 LAB
ANION GAP SERPL CALC-SCNC: 5 MMOL/L (ref 7–16)
BUN SERPL-MCNC: 20 MG/DL (ref 8–23)
CALCIUM SERPL-MCNC: 7.8 MG/DL (ref 8.3–10.4)
CHLORIDE SERPL-SCNC: 107 MMOL/L (ref 98–107)
CO2 SERPL-SCNC: 24 MMOL/L (ref 21–32)
CREAT SERPL-MCNC: 1.1 MG/DL (ref 0.6–1)
GLUCOSE BLD STRIP.AUTO-MCNC: 128 MG/DL (ref 65–100)
GLUCOSE BLD STRIP.AUTO-MCNC: 189 MG/DL (ref 65–100)
GLUCOSE BLD STRIP.AUTO-MCNC: 242 MG/DL (ref 65–100)
GLUCOSE BLD STRIP.AUTO-MCNC: 306 MG/DL (ref 65–100)
GLUCOSE SERPL-MCNC: 116 MG/DL (ref 65–100)
HCT VFR BLD AUTO: 23.3 % (ref 35.8–46.3)
HGB BLD-MCNC: 7.7 G/DL (ref 11.7–15.4)
MAGNESIUM SERPL-MCNC: 1.7 MG/DL (ref 1.8–2.4)
PHOSPHATE SERPL-MCNC: 2.3 MG/DL (ref 2.3–3.7)
POTASSIUM SERPL-SCNC: 4.1 MMOL/L (ref 3.5–5.1)
SERVICE CMNT-IMP: ABNORMAL
SODIUM SERPL-SCNC: 136 MMOL/L (ref 136–145)

## 2022-08-17 PROCEDURE — 6370000000 HC RX 637 (ALT 250 FOR IP): Performed by: PHYSICIAN ASSISTANT

## 2022-08-17 PROCEDURE — 6370000000 HC RX 637 (ALT 250 FOR IP): Performed by: INTERNAL MEDICINE

## 2022-08-17 PROCEDURE — 1100000000 HC RM PRIVATE

## 2022-08-17 PROCEDURE — 6370000000 HC RX 637 (ALT 250 FOR IP): Performed by: NURSE PRACTITIONER

## 2022-08-17 PROCEDURE — 84100 ASSAY OF PHOSPHORUS: CPT

## 2022-08-17 PROCEDURE — 94760 N-INVAS EAR/PLS OXIMETRY 1: CPT

## 2022-08-17 PROCEDURE — 2500000003 HC RX 250 WO HCPCS: Performed by: INTERNAL MEDICINE

## 2022-08-17 PROCEDURE — 6370000000 HC RX 637 (ALT 250 FOR IP): Performed by: HOSPITALIST

## 2022-08-17 PROCEDURE — 82962 GLUCOSE BLOOD TEST: CPT

## 2022-08-17 PROCEDURE — 85018 HEMOGLOBIN: CPT

## 2022-08-17 PROCEDURE — 80048 BASIC METABOLIC PNL TOTAL CA: CPT

## 2022-08-17 PROCEDURE — 6360000002 HC RX W HCPCS: Performed by: INTERNAL MEDICINE

## 2022-08-17 PROCEDURE — 83735 ASSAY OF MAGNESIUM: CPT

## 2022-08-17 PROCEDURE — 2580000003 HC RX 258: Performed by: INTERNAL MEDICINE

## 2022-08-17 RX ORDER — HYDROMORPHONE HYDROCHLORIDE 2 MG/1
1 TABLET ORAL EVERY 4 HOURS PRN
Status: DISCONTINUED | OUTPATIENT
Start: 2022-08-17 | End: 2022-08-23

## 2022-08-17 RX ADMIN — METRONIDAZOLE 250 MG: 500 TABLET ORAL at 12:30

## 2022-08-17 RX ADMIN — CIPROFLOXACIN 250 MG: 500 TABLET, FILM COATED ORAL at 21:01

## 2022-08-17 RX ADMIN — DIPHENOXYLATE HYDROCHLORIDE AND ATROPINE SULFATE 1 TABLET: 2.5; .025 TABLET ORAL at 17:04

## 2022-08-17 RX ADMIN — SERTRALINE HYDROCHLORIDE 100 MG: 25 TABLET ORAL at 08:27

## 2022-08-17 RX ADMIN — BUPROPION HYDROCHLORIDE 150 MG: 150 TABLET, EXTENDED RELEASE ORAL at 08:27

## 2022-08-17 RX ADMIN — METRONIDAZOLE 250 MG: 500 TABLET ORAL at 21:01

## 2022-08-17 RX ADMIN — CIPROFLOXACIN 250 MG: 500 TABLET, FILM COATED ORAL at 08:28

## 2022-08-17 RX ADMIN — SODIUM CHLORIDE, PRESERVATIVE FREE 10 ML: 5 INJECTION INTRAVENOUS at 08:28

## 2022-08-17 RX ADMIN — DIPHENOXYLATE HYDROCHLORIDE AND ATROPINE SULFATE 1 TABLET: 2.5; .025 TABLET ORAL at 21:01

## 2022-08-17 RX ADMIN — HYDROMORPHONE HYDROCHLORIDE 1 MG: 2 TABLET ORAL at 19:27

## 2022-08-17 RX ADMIN — PRAVASTATIN SODIUM 80 MG: 80 TABLET ORAL at 21:01

## 2022-08-17 RX ADMIN — METRONIDAZOLE 250 MG: 500 TABLET ORAL at 06:21

## 2022-08-17 RX ADMIN — HYOSCYAMINE SULFATE 125 MCG: 0.12 TABLET ORAL at 08:27

## 2022-08-17 RX ADMIN — NYSTATIN 500000 UNITS: 100000 SUSPENSION ORAL at 08:27

## 2022-08-17 RX ADMIN — FERROUS SULFATE TAB 325 MG (65 MG ELEMENTAL FE) 325 MG: 325 (65 FE) TAB at 17:04

## 2022-08-17 RX ADMIN — SODIUM CHLORIDE, PRESERVATIVE FREE 10 ML: 5 INJECTION INTRAVENOUS at 21:06

## 2022-08-17 RX ADMIN — NYSTATIN 500000 UNITS: 100000 SUSPENSION ORAL at 12:30

## 2022-08-17 RX ADMIN — HYOSCYAMINE SULFATE 125 MCG: 0.12 TABLET ORAL at 21:01

## 2022-08-17 RX ADMIN — FERROUS SULFATE TAB 325 MG (65 MG ELEMENTAL FE) 325 MG: 325 (65 FE) TAB at 08:27

## 2022-08-17 RX ADMIN — HYOSCYAMINE SULFATE 125 MCG: 0.12 TABLET ORAL at 12:30

## 2022-08-17 RX ADMIN — HYDROMORPHONE HYDROCHLORIDE 1 MG: 2 TABLET ORAL at 09:00

## 2022-08-17 RX ADMIN — INSULIN LISPRO 3 UNITS: 100 INJECTION, SOLUTION INTRAVENOUS; SUBCUTANEOUS at 17:00

## 2022-08-17 RX ADMIN — DIPHENOXYLATE HYDROCHLORIDE AND ATROPINE SULFATE 1 TABLET: 2.5; .025 TABLET ORAL at 12:30

## 2022-08-17 RX ADMIN — NYSTATIN 500000 UNITS: 100000 SUSPENSION ORAL at 17:04

## 2022-08-17 RX ADMIN — HYDROMORPHONE HYDROCHLORIDE 1 MG: 2 TABLET ORAL at 14:35

## 2022-08-17 RX ADMIN — Medication 9 MG: at 21:01

## 2022-08-17 RX ADMIN — DIPHENOXYLATE HYDROCHLORIDE AND ATROPINE SULFATE 1 TABLET: 2.5; .025 TABLET ORAL at 08:27

## 2022-08-17 RX ADMIN — NYSTATIN 500000 UNITS: 100000 SUSPENSION ORAL at 21:01

## 2022-08-17 RX ADMIN — ROPINIROLE HYDROCHLORIDE 0.5 MG: 0.5 TABLET, FILM COATED ORAL at 21:01

## 2022-08-17 RX ADMIN — THIAMINE HYDROCHLORIDE 100 MG: 100 INJECTION, SOLUTION INTRAMUSCULAR; INTRAVENOUS at 08:27

## 2022-08-17 RX ADMIN — PANTOPRAZOLE SODIUM 40 MG: 40 TABLET, DELAYED RELEASE ORAL at 06:21

## 2022-08-17 RX ADMIN — PREDNISONE 60 MG: 20 TABLET ORAL at 08:27

## 2022-08-17 RX ADMIN — CALCIUM GLUCONATE: 98 INJECTION, SOLUTION INTRAVENOUS at 17:01

## 2022-08-17 RX ADMIN — HYDROMORPHONE HYDROCHLORIDE 1 MG: 2 TABLET ORAL at 03:40

## 2022-08-17 ASSESSMENT — PAIN DESCRIPTION - ORIENTATION
ORIENTATION: LOWER
ORIENTATION: LOWER;UPPER

## 2022-08-17 ASSESSMENT — PAIN SCALES - GENERAL
PAINLEVEL_OUTOF10: 8
PAINLEVEL_OUTOF10: 8
PAINLEVEL_OUTOF10: 0
PAINLEVEL_OUTOF10: 6
PAINLEVEL_OUTOF10: 0
PAINLEVEL_OUTOF10: 3
PAINLEVEL_OUTOF10: 9
PAINLEVEL_OUTOF10: 6
PAINLEVEL_OUTOF10: 3

## 2022-08-17 ASSESSMENT — PAIN DESCRIPTION - LOCATION
LOCATION: ABDOMEN

## 2022-08-17 ASSESSMENT — PAIN DESCRIPTION - DESCRIPTORS
DESCRIPTORS: ACHING;CRAMPING
DESCRIPTORS: ACHING

## 2022-08-17 NOTE — PROGRESS NOTES
Hospitalist Progress Note   Admit Date:  2022  3:17 PM   Name:  Danica Hernandez   Age:  65 y.o. Sex:  female  :  1960   MRN:  [id number]   Room:  2/    Presenting Complaint: Nausea     Reason(s) for Admission: Dehydration [E86.0]  Hypokalemia [E87.6]  JULIETH (acute kidney injury) ([facility] [address] [N17.9]  Diarrhea, unspecified type [R19.7]  Acute renal failure superimposed on chronic kidney disease, unspecified CKD stage, unspecified acute renal failure type ([address] [N17.9, N18.9]     Hospital Course & Interval History:     Please refer to the admission H&P for details of presentation. In summary, Danica Hernandez is a 65 y.o. female with medical history significant for crohns disease, HTN, CKD3, depression, HLP who was admitted for several weeks of nausea, emesis and abdominal pain. GI was consulted due to concerns for crohns flare up. Patient was started on IV steroids. Patient symptoms improved initially but worsened after transitioning to PO steroids. CT A/P on  was done due to continued abdominal pain. It showed circumferential wall thickening of colon possibly suggestive of colitis, therefore she was started on cipro and flagyl. Patient also had multiple bouts of diarrhea but was unable to be verified by staff. Stool studies including C. difficile has been negative. Subjective/24 hr Events (22) : Patient is seen and examined at bedside. No acute events reported overnight by nursing staff. Patient reports improvement in abdominal pain as well as bowel movements. However, after eating dinner, she reports worsening abdominal pain with about 10 episodes of bowel movements overnight. Reports that amount of stool is not as much. Per staff, total of 200 cc of stool output overnight. Pain is slightly improved this AM.  GI planning to monitor response to PO steroids before planning to do colonoscopy if symptoms worsens.    Patient denies fever, chills, chest pains, shortness of breath, n/v.    Review of Systems: 10 point review of systems is otherwise negative with the exception of the elements mentioned above.'        Assessment & Plan:     Crohn's disease with flare  Colitis  Possible crohns flare up. Stool studies have been neg including c.diff. CT A/P on 8/13 with findings suggestive of colitis  08/17/22: frequent Bms with worsening abdominal pain overnight. -GI recommendations appreciated. Monitor on PO steroids. If symptoms worsens on PO steroids, CLDs tomorrow and Colonoscopy Friday  -continue with pain control and symptomatic management. Encourage patient to use p.o. pain medication  -Continue to monitor patient's bowel movement outputs. -Continue with Cipro and Flagyl(8/13 - . . )  -Continue Lomotil  -Prednisone 60 mg daily  -continue TPN as per nutrition    JULIETH on CKD3  Creatinine peaked at 3.80 on 8/8 08/17/22: Cr stable at 1.10.  - encouraged PO intake. - monitor renal function    HTN  // HLD  08/17/22:  BP continues to be stable while off BP meds. Continue to hold and restart as needed  - continue with home statin    Depression: continue with home wellbutrin and zoloft  RLS: continue with requip      Hyponatremia  Na of 127 on admission and now up to 133  - encouraged PO intake    Moderate Protein Calorie Malnutrition  - nutrition on board    Anemia, iron deficiency  - now on PO iron    Discharge Planning:  pending improvement of GI symptoms. Diet:  ADULT DIET; Regular; No Concentrated sweets  PN-Adult Premix 4.25/5 - Peripheral Line  ADULT ORAL NUTRITION SUPPLEMENT; Breakfast, Lunch, Dinner; Low Calorie/High Protein Oral Supplement  DVT PPx: SCDs  Code status: Full Code        I have personally reviewed and ordered clinical lab tests and independently visualized images, tracing.      I spent 35 minutes of time caring for this patient at bedside or nearby, and more than 50 percent of which was spent on coordination of care and/or patient/family counseling regarding the disease process, status , and treatment options/plan of care. Discuss with RN and Nutrition team.    Hospital Problems:  Principal Problem:    JULIETH (acute kidney injury) ([facility] [address]  Active Problems:    Crohn's disease ([facility] [address]    Hyponatremia    Hypertension    Hyperlipidemia    Depression    Moderate protein-calorie malnutrition (HCC)    Iron deficiency anemia due to chronic blood loss    RLS (restless legs syndrome)    CKD (chronic kidney disease) stage 3, GFR 30-59 ml/min (HCC)    Acute hypokalemia    Colitis    Anemia    Essential hypertension, benign  Resolved Problems:    * No resolved hospital problems. *      Objective:   Patient Vitals for the past 24 hrs:   Temp Pulse Resp BP SpO2   08/17/22 0803 97.7 °F (36.5 °C) 80 19 125/80 96 %   08/17/22 0410 -- -- 16 -- --   08/17/22 0340 -- -- 16 -- --   08/17/22 0309 98.6 °F (37 °C) 87 16 130/78 96 %   08/16/22 2331 99 °F (37.2 °C) 88 16 132/78 93 %   08/16/22 1915 98.8 °F (37.1 °C) 93 18 (!) 145/88 94 %   08/16/22 1458 97.3 °F (36.3 °C) 90 19 128/78 95 %   08/16/22 1151 98.1 °F (36.7 °C) 89 19 134/78 95 %       Oxygen Therapy  SpO2: 96 %  O2 Device: None (Room air)    Estimated body mass index is 26.2 kg/m² as calculated from the following:    Height as of this encounter: 5' 3\" (1.6 m). Weight as of this encounter: 147 lb 14.9 oz (67.1 kg). Intake/Output Summary (Last 24 hours) at 8/17/2022 1029  Last data filed at 8/17/2022 0635  Gross per 24 hour   Intake 440 ml   Output 205 ml   Net 235 ml         Physical Exam:     Blood pressure 125/80, pulse 80, temperature 97.7 °F (36.5 °C), temperature source Oral, resp. rate 19, height 5' 3\" (1.6 m), weight 147 lb 14.9 oz (67.1 kg), SpO2 96 %. General:    Alert and awake. Not in apparent distress  Head:  Normocephalic, atraumatic  Eyes:  Sclerae appear normal.  Pupils equally round. ENT:  Nares appear normal, no drainage. Moist oral mucosa  Neck:  No restricted ROM. Trachea midline   CV:   RRR. No m/r/g.   No jugular venous distension. Lungs:   CTAB. No wheezing. Symmetric expansion. Abdomen: Bowel sounds present. Soft, slight TTP to palpation (more of soreness), nondistended. Extremities: No cyanosis or clubbing. No edema  Skin:     No rashes and normal coloration. Warm and dry. Neuro:  CN II-XII grossly intact. A&Ox3  Psych:  Normal mood and affect. I have personally reviewed labs and tests showing:  Recent Labs:  Recent Results (from the past 48 hour(s))   POCT Glucose    Collection Time: 08/15/22 11:08 AM   Result Value Ref Range    POC Glucose 202 (H) 65 - 100 mg/dL    Performed by: Humberto    POCT Glucose    Collection Time: 08/15/22  4:17 PM   Result Value Ref Range    POC Glucose 195 (H) 65 - 100 mg/dL    Performed by: Humberto    POCT Glucose    Collection Time: 08/15/22  8:43 PM   Result Value Ref Range    POC Glucose 165 (H) 65 - 100 mg/dL    Performed by:  Toscano (Elaine)BSN    Basic Metabolic Panel    Collection Time: 08/16/22  5:59 AM   Result Value Ref Range    Sodium 133 (L) 136 - 145 mmol/L    Potassium 5.5 (H) 3.5 - 5.1 mmol/L    Chloride 107 98 - 107 mmol/L    CO2 21 21 - 32 mmol/L    Anion Gap 5 (L) 7 - 16 mmol/L    Glucose 273 (H) 65 - 100 mg/dL    BUN 19 8 - 23 MG/DL    Creatinine 1.10 (H) 0.6 - 1.0 MG/DL    GFR African American >60 >60 ml/min/1.73m2    GFR Non- 54 (L) >60 ml/min/1.73m2    Calcium 7.3 (L) 8.3 - 10.4 MG/DL   Magnesium    Collection Time: 08/16/22  5:59 AM   Result Value Ref Range    Magnesium 1.8 1.8 - 2.4 mg/dL   Phosphorus    Collection Time: 08/16/22  5:59 AM   Result Value Ref Range    Phosphorus 4.8 (H) 2.3 - 3.7 MG/DL   POCT Glucose    Collection Time: 08/16/22  7:21 AM   Result Value Ref Range    POC Glucose 205 (H) 65 - 100 mg/dL    Performed by: Joseph    POCT Glucose    Collection Time: 08/16/22 11:53 AM   Result Value Ref Range    POC Glucose 238 (H) 65 - 100 mg/dL    Performed by: Joseph    POCT Glucose    Collection Time: 08/16/22  4:31 PM   Result Value Ref Range    POC Glucose 214 (H) 65 - 100 mg/dL    Performed by: Joseph    POCT Glucose    Collection Time: 08/16/22  9:00 PM   Result Value Ref Range    POC Glucose 137 (H) 65 - 100 mg/dL    Performed by: Sophia    Phosphorus    Collection Time: 08/17/22  4:40 AM   Result Value Ref Range    Phosphorus 2.3 2.3 - 3.7 MG/DL   Basic Metabolic Panel w/ Reflex to MG    Collection Time: 08/17/22  4:40 AM   Result Value Ref Range    Sodium 136 136 - 145 mmol/L    Potassium 4.1 3.5 - 5.1 mmol/L    Chloride 107 98 - 107 mmol/L    CO2 24 21 - 32 mmol/L    Anion Gap 5 (L) 7 - 16 mmol/L    Glucose 116 (H) 65 - 100 mg/dL    BUN 20 8 - 23 MG/DL    Creatinine 1.10 (H) 0.6 - 1.0 MG/DL    GFR African American >60 >60 ml/min/1.73m2    GFR Non- 54 (L) >60 ml/min/1.73m2    Calcium 7.8 (L) 8.3 - 10.4 MG/DL   Hemoglobin and Hematocrit    Collection Time: 08/17/22  4:40 AM   Result Value Ref Range    Hemoglobin 7.7 (L) 11.7 - 15.4 g/dL    Hematocrit 23.3 (L) 35.8 - 46.3 %   POCT Glucose    Collection Time: 08/17/22  8:04 AM   Result Value Ref Range    POC Glucose 128 (H) 65 - 100 mg/dL    Performed by: Joseph        I have personally reviewed imaging studies showing: Other Studies:  CT ABDOMEN PELVIS W IV CONTRAST Additional Contrast? Oral   Final Result   1. Circumferential wall thickening over a long segment of colon involving the   distal transverse, descending and sigmoid colon. The apparent thickening may be   accentuated by incomplete luminal distention. Colitis could be considered in the   setting of abdominal pain. 2. Hydropic gallbladder, no gallstones or obvious gallbladder wall thickening. CT ABDOMEN PELVIS RENAL STONE Additional Contrast? None   Final Result   1.  No acute abdominal or pelvic abnormality noted on this limited examination   without the benefit of intravenous or oral contrast. Specifically, no renal or   ureteral stones present. There is no hydronephrosis or hydroureter. US RETROPERITONEAL COMPLETE   Final Result      Mild right hydronephrosis, of uncertain etiology. Otherwise, unremarkable   ultrasound of the kidneys.                 Current Meds:  Current Facility-Administered Medications   Medication Dose Route Frequency    HYDROmorphone (DILAUDID) tablet 1 mg  1 mg Oral Q4H PRN    predniSONE (DELTASONE) tablet 60 mg  60 mg Oral Daily    PN-Adult Premix 4.25/5 - Peripheral Line   IntraVENous Continuous TPN    thiamine (B-1) injection 100 mg  100 mg IntraVENous Daily    potassium chloride 20 mEq/50 mL IVPB (Central Line)  20 mEq IntraVENous PRN    Or    potassium chloride 10 mEq/100 mL IVPB (Peripheral Line)  10 mEq IntraVENous PRN    magnesium sulfate 2000 mg in 50 mL IVPB premix  2,000 mg IntraVENous PRN    sodium phosphate 10 mmol in sodium chloride 0.9 % 250 mL IVPB  10 mmol IntraVENous PRN    Or    sodium phosphate 15 mmol in sodium chloride 0.9 % 250 mL IVPB  15 mmol IntraVENous PRN    Or    sodium phosphate 20 mmol in sodium chloride 0.9 % 500 mL IVPB  20 mmol IntraVENous PRN    ciprofloxacin (CIPRO) tablet 250 mg  250 mg Oral 2 times per day    metroNIDAZOLE (FLAGYL) tablet 250 mg  250 mg Oral 3 times per day    diatrizoate meglumine-sodium (GASTROGRAFIN) 66-10 % solution 15 mL  15 mL Oral ONCE PRN    ferrous sulfate (IRON 325) tablet 325 mg  325 mg Oral BID WC    melatonin tablet 9 mg  9 mg Oral Nightly    traMADol (ULTRAM) tablet 50 mg  50 mg Oral Q6H PRN    Or    traMADol (ULTRAM) tablet 100 mg  100 mg Oral Q6H PRN    insulin lispro (HUMALOG) injection vial 0-4 Units  0-4 Units SubCUTAneous TID WC    insulin lispro (HUMALOG) injection vial 0-4 Units  0-4 Units SubCUTAneous Nightly    glucose chewable tablet 16 g  4 tablet Oral PRN    dextrose bolus 10% 125 mL  125 mL IntraVENous PRN    Or    dextrose bolus 10% 250 mL  250 mL IntraVENous PRN    glucagon (rDNA) injection 1 mg  1 mg SubCUTAneous PRN    dextrose 10 % infusion   IntraVENous Continuous PRN    hyoscyamine (LEVSIN/SL) sublingual tablet 125 mcg  125 mcg SubLINGual TID    diphenoxylate-atropine (LOMOTIL) 2.5-0.025 MG per tablet 1 tablet  1 tablet Oral 4x Daily    buPROPion (WELLBUTRIN SR) extended release tablet 150 mg  150 mg Oral QAM    pantoprazole (PROTONIX) tablet 40 mg  40 mg Oral QAM AC    pravastatin (PRAVACHOL) tablet 80 mg  80 mg Oral Nightly    sertraline (ZOLOFT) tablet 100 mg  100 mg Oral Daily    sodium chloride flush 0.9 % injection 5-40 mL  5-40 mL IntraVENous 2 times per day    sodium chloride flush 0.9 % injection 5-40 mL  5-40 mL IntraVENous PRN    0.9 % sodium chloride infusion   IntraVENous PRN    ondansetron (ZOFRAN-ODT) disintegrating tablet 4 mg  4 mg Oral Q8H PRN    Or    ondansetron (ZOFRAN) injection 4 mg  4 mg IntraVENous Q6H PRN    polyethylene glycol (GLYCOLAX) packet 17 g  17 g Oral Daily PRN    acetaminophen (TYLENOL) tablet 650 mg  650 mg Oral Q6H PRN    Or    acetaminophen (TYLENOL) suppository 650 mg  650 mg Rectal Q6H PRN    nystatin (MYCOSTATIN) 715116 UNIT/ML suspension 500,000 Units  5 mL Oral 4x Daily    morphine injection 2 mg  2 mg IntraVENous Q4H PRN    rOPINIRole (REQUIP) tablet 0.5 mg  0.5 mg Oral Nightly       Signed:  Donya Frausto MD    Part of this note may have been written by using a voice dictation software. The note has been proof read but may still contain some grammatical/other typographical errors.

## 2022-08-17 NOTE — PROGRESS NOTES
Comprehensive Nutrition Assessment    Type and Reason for Visit: Reassess  Malnutrition Screening Tool: Malnutrition Screen  Have you recently lost weight without trying?: 14 to 23 pounds (2 points)  Have you been eating poorly because of a decreased appetite?: Yes (1 point)  Malnutrition Screening Tool Score: 3  TPN consult (Hospitalist)  Nutrition Recommendations/Plan:   Meals and Snacks:  Diet: Continue current order  Continue to encouraged pt to have family/spouse provide preferred meals/food to increase kcal and protein intake during the day  Nutrition Supplement Therapy:  Medical food supplement therapy:  Change Ensure Clear to Ensure High Protein three times per day (this provides 160 kcal and 16 grams protein per bottle)    Parenteral Nutrition:  Central parenteral nutrition  new bag to begin at 1800 today  Continue: Dex 5%, 4.25% AA 2 L (85ml/hr)   Lytes/L:   Sodium ( 70 meq NaCl), Potassium (KCl not available for inclusion in TPN at this time secondary to national shortages) Phosphorus ( 8 mmol KPO4), Calcium (4.5 meq), Magnesium 10 meq   Other additives:   MVI Monday Wednesday Friday due to [employer], MTE  Nutrition Related Medication Management: Thiamine  mg x 7 days   Labs:   BMP daily  Mg daily x 3 days then MWF  Phos daily x 3 days then MWF    POC Glucoses/SSI Active    If pt continues to be unable to meet at least 50% of estimated nutrition needs, consider placement of NGT for short term nutrition needs as pt has had decreased stool output. Malnutrition Assessment:  Malnutrition Status:  Moderate malnutrition  Context: Chronic Illness  Findings of clinical characteristics of malnutrition:   Energy Intake:  Mild decrease in energy intake (Comment) (bites at meals x3 weeks)  Weight Loss:  Mild weight loss (specify amount and time period) (20% wt loss since 6/2021)     Body Fat Loss:  Mild body fat loss Triceps, Fat Overlying Ribs   Muscle Mass Loss:  Mild muscle mass loss Clavicles (pectoralis & deltoids), Thigh (quadraceps), Calf (gastrocnemius), Scapula (trapezius), Hand (interosseous)  Fluid Accumulation:  No significant fluid accumulation     Strength:  Not Performed  Nutrition Assessment:  Nutrition History: Pt states she has been having poor po intake x3 weeks PTA consuming bites at meals. She reports taste changes, loss of appetite, and N/V/D. Pt reports ongoing wt loss of the past 2 years weighing 200lbs prior to wt loss. Per EMR, pt weighed 167lbs 6/8/21. Do You Have Any Cultural, Mandaeism, or Ethnic Food Preferences?: No   Nutrition Background:   Pt with PMH notable for Crohn's disease of large and small bowel, GERD, HTN, OA, and CKD III. Pt presents d/t diarrhea, nausea, abdominal cramping and decreased po intake. Pt admitted with JULIETH and Crohn's flare. GI recommending TPN. Nutrition Interval:  PORT in place - accessed 8/14    Pt reports having 8 BMs overnight. She provided meal tickets from yesterday with number of bites taken of each food item. Overall, pt consumed 50-75%  of meals which meets at least 50% of estimated nutrition needs. [employer] message sent to Dr. Radha Duran via [employer] regarding po intake. Pt reports having to urinate multiple times overnight. GI wishes to continue TPN at this time. Per GI note, \"If symptoms persist/recur off IV steroids, consider beginning CLD tomorrow followed by IP colonoscopy on Friday\". Pt states the food here at the hospital is \"too heavy\" despite ordering and meals and food she prefers with the pt dining associate. Reiterated her  can provide her preferred foods to help better meet her nutrition needs. Addendum: RN states pt requested RD visit. Pt requesting clarification for nutrition goals. Informed pt current goal is to improve po intake. Discussed any food intake compared to what she was eating PTA is considered improvement.  Reviewed pt's prior reported barriers to intake such as disliking hospital food, minimal appetite, and her reported mental block to eating. Offered previously discussed solutions such as drinking ONS between meals and working with pt dining associates to choose meal and food options. Continued to encouraged pt to have her  bring preferred meals to help improve po intake. Pt verbalized understanding. Observed pt ordered chicken noodle soup this evening. Abdominal Status (last documented):   Last BM (including prior to admit): 08/15/22, GI Symptoms: Diarrhea  Pertinent Medications: wellbutrin, cipro, lomotil (4 x daily), ferrous sulfate, SSI (3 units yesterday, none required thus far today), melatonin, flagyl, nystatin, protonix, pravachol, prednisone, thiamine (to complete 8/21)  Continuous: none  PRN: dilaudid po (last admin 8/17), zofran (last admin 8/17)  Electrolyte Replacement: 8/14: magnesium sulfate 2gm x2, sodium phos 15mmol   Pertinent Labs:   Lab Results   Component Value Date/Time     08/17/2022 04:40 AM    K 4.1 08/17/2022 04:40 AM     08/17/2022 04:40 AM    CO2 24 08/17/2022 04:40 AM    BUN 20 08/17/2022 04:40 AM    CREATININE 1.10 08/17/2022 04:40 AM    GLUCOSE 116 08/17/2022 04:40 AM    CALCIUM 7.8 08/17/2022 04:40 AM    PHOS 2.3 08/17/2022 04:40 AM    MG 1.7 08/17/2022 04:40 AM      Lab Results   Component Value Date/Time    POCGLU 189 08/17/2022 11:18 AM    POCGLU 128 08/17/2022 08:04 AM    POCGLU 137 08/16/2022 09:00 PM    POCGLU 214 08/16/2022 04:31 PM    POCGLU 238 08/16/2022 11:53 AM    POCGLU 205 08/16/2022 07:21 AM       Labs reviewed and remarkable for improved sodium with increase in TPN yesterday. K and phos WNL. None currently in TPN at this time. Mg low today. Glucose improved today. Noted pt now receiving po steroids. Current Nutrition Therapies:  ADULT DIET; Regular; No Concentrated sweets  PN-Adult Premix 4.25/5 - Peripheral Line  ADULT ORAL NUTRITION SUPPLEMENT; Breakfast, Lunch, Dinner;  Low Calorie/High Protein Oral Supplement  Current Parenteral Nutrition Orders:  Type and Formula: Premix Central (Dex 5%; 4.25% AA)   Lipids: 250ml, Daily  Duration: Continuous  Rate/Volume: 85ml/hr (2L)  Current PN Order Provides: infusing per order  Goal PN Orders Provides: 1180 kcal/d (78% of needs), 85 grams of protein/d (108% of needs), 100 grams of CHO/d and 2290 ml of total volume/d    Current Intake:   Average Meal Intake: 51-75% Average Supplements Intake: 26-50%      Anthropometric Measures:  Height: 5' 3\" (160 cm)  Current Body Wt: 147 lb 14.9 oz (67.1 kg) (8/17), Weight source: Bed Scale  BMI: 26.2, Normal Weight (BMI 18.5-24. 9)  Admission Body Weight: 133 lb 13.1 oz (60.7 kg) (8/9; bed scale)  Ideal Body Weight (Kg) (Calculated): 52 kg (115 lbs), 116.4 %  Usual Body Wt: 167 lb (75.8 kg) (6/8/21; MD office visit), Percent weight change: -19.9       Edema:Peripheral Vascular  Peripheral Vascular (WDL): Within Defined Limits   BMI Category Normal Weight (BMI 18.5-24. 9)  Estimated Daily Nutrient Needs:  Energy (kcal/day): 8686-7719 (Kcal/kg (25-30) Weight used:   Current (60.7kg)  Protein (g/day): 61-79 (1-1.3 g/kg) Weight Used: (Current) 61 kg (8/10)  Fluid (ml/day):   (1 ml/kcal)    Nutrition Diagnosis:   Inadequate oral intake related to altered GI function, altered taste perception (loss of appetite, food preferences) as evidenced by  (pt reported barriers, po <25% needs, reports significant gi losses)  Moderate malnutrition, In context of chronic illness related to inadequate protein-energy intake as evidenced by Criteria as identified in malnutrition assessment  Nutrition Interventions:   Food and/or Nutrient Delivery: Continue Current Diet, Continue Oral Nutrition Supplement, Modify Parenteral Nutrition     Coordination of Nutrition Care: Continue to monitor while inpatient  Plan of Care discussed with: Dr. Mary Ann Sorto, Dr. Antoinette Patel via [employer]    Goals:   Previous Goal Met: Progressing toward Goal(s)  Active Goal:  (TPN to meet >75% estimated nutrition needs until pt is able to meet >50% estimated nutrition needs via PO intake.)       Nutrition Monitoring and Evaluation:      Food/Nutrient Intake Outcomes: Food and Nutrient Intake, Supplement Intake, Parenteral Nutrition Intake/Tolerance  Physical Signs/Symptoms Outcomes: Biochemical Data, GI Status, Fluid Status or Edema, Meal Time Behavior, Weight    Discharge Planning:     Too soon to determine    Lillina Rucker MS, RDN, LD  Contact: [phone]

## 2022-08-17 NOTE — PROGRESS NOTES
Pt resting in bed  at bedside. Pt reports eating approximately 25% of lunch and complaints of feeling up fast. Pt reports BM \"marlon been going and going today\" collected stool for this shift approximately 50 cc of liquid stool. Pt reports abd pain 8/10 at this time. Pt encouraged to call for assistance if needed call light in reach, will monitor.

## 2022-08-17 NOTE — PROGRESS NOTES
Pt sitting up in bed. Pt ate approximately 90 % of soup for dinner. No acute distress noted at this time. Call light in reach, will monitor.

## 2022-08-17 NOTE — CARE COORDINATION
MSN, CM:  patient with c/o 10 BM overnight. GI consulted with possible colonoscopy Friday if needed. Patient continues on PO steroids. Case Management will continue to follow.

## 2022-08-17 NOTE — PROGRESS NOTES
Patient resting in bed on room air. A&Ox4. Respirations even and unlabored. Patient denies pain and is in no acute distress at this time. 205mL of stool measured throughout shift. PPN infusing at 85mL/hr. No acute events overnight. Call light within reach, will continue to monitor. Preparing to give report to oncoming RN.

## 2022-08-17 NOTE — PROGRESS NOTES
Pt resting in bed. Pt awakens when spoken to. Pt complaints of pain 8/10 to abd. Pt reports 8 BM during the night. Stool Output approximately 205 cc reported by previous nurse. No distress noted at this time. Pt on RA. Call light in reach, will monitor.

## 2022-08-17 NOTE — PROGRESS NOTES
[facility] Progress Note         Admit Date:  8/8/2022    Today's Date:  8/17/2022    CC:  Crohn's disease, abdominal pain, diarrhea    Subjective:     Diarrhea increased overnight - patient reports 8-10 episodes, ~200 cc documented overall. Also c/o increased pain. Last night reported \"oral steroids aren't working,\" although she had not received an oral steroids and this would have been before her IV steroids would have been scheduled.   Tolerating regular diet, ate this AM.    Medications:   Current Facility-Administered Medications   Medication Dose Route Frequency    HYDROmorphone (DILAUDID) tablet 1 mg  1 mg Oral Q4H PRN    predniSONE (DELTASONE) tablet 60 mg  60 mg Oral Daily    PN-Adult Premix 4.25/5 - Peripheral Line   IntraVENous Continuous TPN    thiamine (B-1) injection 100 mg  100 mg IntraVENous Daily    potassium chloride 20 mEq/50 mL IVPB (Central Line)  20 mEq IntraVENous PRN    Or    potassium chloride 10 mEq/100 mL IVPB (Peripheral Line)  10 mEq IntraVENous PRN    magnesium sulfate 2000 mg in 50 mL IVPB premix  2,000 mg IntraVENous PRN    sodium phosphate 10 mmol in sodium chloride 0.9 % 250 mL IVPB  10 mmol IntraVENous PRN    Or    sodium phosphate 15 mmol in sodium chloride 0.9 % 250 mL IVPB  15 mmol IntraVENous PRN    Or    sodium phosphate 20 mmol in sodium chloride 0.9 % 500 mL IVPB  20 mmol IntraVENous PRN    ciprofloxacin (CIPRO) tablet 250 mg  250 mg Oral 2 times per day    metroNIDAZOLE (FLAGYL) tablet 250 mg  250 mg Oral 3 times per day    diatrizoate meglumine-sodium (GASTROGRAFIN) 66-10 % solution 15 mL  15 mL Oral ONCE PRN    ferrous sulfate (IRON 325) tablet 325 mg  325 mg Oral BID WC    melatonin tablet 9 mg  9 mg Oral Nightly    traMADol (ULTRAM) tablet 50 mg  50 mg Oral Q6H PRN    Or    traMADol (ULTRAM) tablet 100 mg  100 mg Oral Q6H PRN    insulin lispro (HUMALOG) injection vial 0-4 Units  0-4 Units SubCUTAneous TID WC    insulin lispro (HUMALOG) injection vial 0-4 Units  0-4 Units SubCUTAneous Nightly    glucose chewable tablet 16 g  4 tablet Oral PRN    dextrose bolus 10% 125 mL  125 mL IntraVENous PRN    Or    dextrose bolus 10% 250 mL  250 mL IntraVENous PRN    glucagon (rDNA) injection 1 mg  1 mg SubCUTAneous PRN    dextrose 10 % infusion   IntraVENous Continuous PRN    hyoscyamine (LEVSIN/SL) sublingual tablet 125 mcg  125 mcg SubLINGual TID    diphenoxylate-atropine (LOMOTIL) 2.5-0.025 MG per tablet 1 tablet  1 tablet Oral 4x Daily    buPROPion (WELLBUTRIN SR) extended release tablet 150 mg  150 mg Oral QAM    pantoprazole (PROTONIX) tablet 40 mg  40 mg Oral QAM AC    pravastatin (PRAVACHOL) tablet 80 mg  80 mg Oral Nightly    sertraline (ZOLOFT) tablet 100 mg  100 mg Oral Daily    sodium chloride flush 0.9 % injection 5-40 mL  5-40 mL IntraVENous 2 times per day    sodium chloride flush 0.9 % injection 5-40 mL  5-40 mL IntraVENous PRN    0.9 % sodium chloride infusion   IntraVENous PRN    ondansetron (ZOFRAN-ODT) disintegrating tablet 4 mg  4 mg Oral Q8H PRN    Or    ondansetron (ZOFRAN) injection 4 mg  4 mg IntraVENous Q6H PRN    polyethylene glycol (GLYCOLAX) packet 17 g  17 g Oral Daily PRN    acetaminophen (TYLENOL) tablet 650 mg  650 mg Oral Q6H PRN    Or    acetaminophen (TYLENOL) suppository 650 mg  650 mg Rectal Q6H PRN    nystatin (MYCOSTATIN) 395828 UNIT/ML suspension 500,000 Units  5 mL Oral 4x Daily    morphine injection 2 mg  2 mg IntraVENous Q4H PRN    rOPINIRole (REQUIP) tablet 0.5 mg  0.5 mg Oral Nightly       Review of Systems:  ROS was obtained, with pertinent positives as listed above. No chest pain or SOB. Diet:  regular diet    Objective:   Vitals:  /80   Pulse 80   Temp 97.7 °F (36.5 °C) (Oral)   Resp 19   Ht 5' 3\" (1.6 m)   Wt 147 lb 14.9 oz (67.1 kg)   SpO2 96%   BMI 26.20 kg/m²   Intake/Output:  No intake/output data recorded. 08/15 1901 - 08/17 0700  In: 3884 [P. O.:660; I.V.:3224]  Out: 9948 [Urine:2950]  Exam:  General appearance: alert, cooperative, no distress, moon facies noted  Lungs: clear to auscultation bilaterally anteriorly  Heart: regular rate and rhythm  Abdomen: soft, mildly tender periumbilical . Bowel sounds normal. No masses, no organomegaly  Extremities: extremities normal, atraumatic, no cyanosis or edema  Neuro:  alert and oriented    Data Review (Labs):    Recent Labs     08/15/22  0425 08/16/22  0559 08/17/22  0440   WBC 4.9  --   --    HGB 7.1*  --  7.7*   HCT 21.1*  --  23.3*   *  --   --    MCV 91.7  --   --    * 133* 136   K 4.1 5.5* 4.1    107 107   CO2 21 21 24   BUN 13 19 20   CREATININE 1.10* 1.10* 1.10*   CALCIUM 7.0* 7.3* 7.8*   MG 2.0 1.8  --    PHOS 3.3 4.8* 2.3   GLUCOSE 260* 273* 116*       Assessment:     Principal Problem:    JULIETH (acute kidney injury) ([facility] [address]  Active Problems:    Crohn's disease (HCC)    Hyponatremia    Hypertension    Hyperlipidemia    Depression    Moderate protein-calorie malnutrition (HCC)    Iron deficiency anemia due to chronic blood loss    RLS (restless legs syndrome)    CKD (chronic kidney disease) stage 3, GFR 30-59 ml/min (HCC)    Acute hypokalemia    Colitis    Anemia    Essential hypertension, benign  Resolved Problems:    * No resolved hospital problems. *    65 y.o. female with PMH including but not limited to crohns disease (lg and sm bowel), HTN, CKD3, depression, HLP, RLS, who we are following for Crohn's flare with abdominal cramping, nausea, rectal spasm, non-bloody diarrhea, dehydration, and wt loss. She has failed Remicade, Humira, Entyvio, Stelara. Currenlty on [place] [employer] but not in remission. Recent Cdiff neg, Calprotectin >1200 and CT evidence of bowel thickening cw CD. Has electrolyte imbalance and JULIETH on admission.      8/10/22:  Renal function improved  Sxs better  Mag and k low  Iron low     8/11/22  Almost ready for discharge  Cont ivf for JULIETH  Switch to prednisone  Tramadol prn     8/12/22  Did not tolerate po switch  Afraid if she goes home she will be right back  Creat better  Does not like to take pain meds     8/13/22  Not any better  Need to consider TPN  There is a stool organism being evaluated  Last CT w/o contrast  Alb 2.0     8/14/22:  she is better with iv steroids but having some side effects such as shaking  There is still a pending stool culture hidden under the results tab  On cipro / flagyl/ and steroids  If she does not respond she may need a colectomy and this was discussed with her  Hx of SB disease also so may need to get a SBFT    8/15:  \"Feeling much better\" this AM.  One small stool reported this AM by RN, none throughout the night, although she reports having \"a dozen\" episodes of diarrhea in the past 24 hours. Abd pain persists, improves with dilaudid. Poor appetite, although tolerating PO without significant n/v. Apprehensive to transition to PO steroids. 8/16/2022: Abdominal pain and diarrhea improving. Tolerating some po intake, but remains on TPN. Stool culture final result negative. Plan:     - Monitor response to PO steroids, first dose of prednisone 60mg daily received today  - If symptoms persist/recur off IV steroids, consider beginning CLD tomorrow followed by IP colonoscopy on Friday  - Continue TPN for now  - Will follow      Loco Reynoso M.D. [facility]

## 2022-08-18 PROBLEM — E87.1 HYPONATREMIA: Status: RESOLVED | Noted: 2022-08-08 | Resolved: 2022-08-18

## 2022-08-18 LAB
ANION GAP SERPL CALC-SCNC: 4 MMOL/L (ref 7–16)
BUN SERPL-MCNC: 29 MG/DL (ref 8–23)
CALCIUM SERPL-MCNC: 7.8 MG/DL (ref 8.3–10.4)
CHLORIDE SERPL-SCNC: 106 MMOL/L (ref 98–107)
CO2 SERPL-SCNC: 26 MMOL/L (ref 21–32)
CREAT SERPL-MCNC: 1 MG/DL (ref 0.6–1)
GLUCOSE BLD STRIP.AUTO-MCNC: 174 MG/DL (ref 65–100)
GLUCOSE BLD STRIP.AUTO-MCNC: 214 MG/DL (ref 65–100)
GLUCOSE BLD STRIP.AUTO-MCNC: 259 MG/DL (ref 65–100)
GLUCOSE BLD STRIP.AUTO-MCNC: 295 MG/DL (ref 65–100)
GLUCOSE SERPL-MCNC: 207 MG/DL (ref 65–100)
MAGNESIUM SERPL-MCNC: 2.1 MG/DL (ref 1.8–2.4)
POTASSIUM SERPL-SCNC: 3.8 MMOL/L (ref 3.5–5.1)
SERVICE CMNT-IMP: ABNORMAL
SODIUM SERPL-SCNC: 136 MMOL/L (ref 136–145)

## 2022-08-18 PROCEDURE — 6370000000 HC RX 637 (ALT 250 FOR IP): Performed by: HOSPITALIST

## 2022-08-18 PROCEDURE — 6370000000 HC RX 637 (ALT 250 FOR IP): Performed by: INTERNAL MEDICINE

## 2022-08-18 PROCEDURE — 80048 BASIC METABOLIC PNL TOTAL CA: CPT

## 2022-08-18 PROCEDURE — 2500000003 HC RX 250 WO HCPCS: Performed by: INTERNAL MEDICINE

## 2022-08-18 PROCEDURE — 6370000000 HC RX 637 (ALT 250 FOR IP): Performed by: NURSE PRACTITIONER

## 2022-08-18 PROCEDURE — 83735 ASSAY OF MAGNESIUM: CPT

## 2022-08-18 PROCEDURE — 2580000003 HC RX 258: Performed by: INTERNAL MEDICINE

## 2022-08-18 PROCEDURE — 6370000000 HC RX 637 (ALT 250 FOR IP): Performed by: PHYSICIAN ASSISTANT

## 2022-08-18 PROCEDURE — 82962 GLUCOSE BLOOD TEST: CPT

## 2022-08-18 PROCEDURE — 6360000002 HC RX W HCPCS: Performed by: INTERNAL MEDICINE

## 2022-08-18 PROCEDURE — 1100000000 HC RM PRIVATE

## 2022-08-18 RX ORDER — POLYETHYLENE GLYCOL 3350 17 G/17G
238 POWDER, FOR SOLUTION ORAL ONCE
Status: COMPLETED | OUTPATIENT
Start: 2022-08-18 | End: 2022-08-18

## 2022-08-18 RX ADMIN — NYSTATIN 500000 UNITS: 100000 SUSPENSION ORAL at 16:16

## 2022-08-18 RX ADMIN — BUPROPION HYDROCHLORIDE 150 MG: 150 TABLET, EXTENDED RELEASE ORAL at 08:36

## 2022-08-18 RX ADMIN — NYSTATIN 500000 UNITS: 100000 SUSPENSION ORAL at 11:54

## 2022-08-18 RX ADMIN — PREDNISONE 60 MG: 20 TABLET ORAL at 08:36

## 2022-08-18 RX ADMIN — HYDROMORPHONE HYDROCHLORIDE 1 MG: 2 TABLET ORAL at 00:42

## 2022-08-18 RX ADMIN — PRAVASTATIN SODIUM 80 MG: 80 TABLET ORAL at 21:25

## 2022-08-18 RX ADMIN — HYDROMORPHONE HYDROCHLORIDE 1 MG: 2 TABLET ORAL at 12:36

## 2022-08-18 RX ADMIN — HYOSCYAMINE SULFATE 125 MCG: 0.12 TABLET ORAL at 08:36

## 2022-08-18 RX ADMIN — SODIUM CHLORIDE, PRESERVATIVE FREE 5 ML: 5 INJECTION INTRAVENOUS at 08:38

## 2022-08-18 RX ADMIN — HYDROMORPHONE HYDROCHLORIDE 1 MG: 2 TABLET ORAL at 21:24

## 2022-08-18 RX ADMIN — BISACODYL 20 MG: 5 TABLET, COATED ORAL at 16:16

## 2022-08-18 RX ADMIN — POLYETHYLENE GLYCOL 3350 238 G: 17 POWDER, FOR SOLUTION ORAL at 17:14

## 2022-08-18 RX ADMIN — NYSTATIN 500000 UNITS: 100000 SUSPENSION ORAL at 21:25

## 2022-08-18 RX ADMIN — DIPHENOXYLATE HYDROCHLORIDE AND ATROPINE SULFATE 1 TABLET: 2.5; .025 TABLET ORAL at 08:36

## 2022-08-18 RX ADMIN — HYOSCYAMINE SULFATE 125 MCG: 0.12 TABLET ORAL at 21:25

## 2022-08-18 RX ADMIN — NYSTATIN 500000 UNITS: 100000 SUSPENSION ORAL at 08:36

## 2022-08-18 RX ADMIN — HYOSCYAMINE SULFATE 125 MCG: 0.12 TABLET ORAL at 11:55

## 2022-08-18 RX ADMIN — ONDANSETRON 4 MG: 2 INJECTION INTRAMUSCULAR; INTRAVENOUS at 21:24

## 2022-08-18 RX ADMIN — HYDROMORPHONE HYDROCHLORIDE 1 MG: 2 TABLET ORAL at 06:09

## 2022-08-18 RX ADMIN — INSULIN LISPRO 2 UNITS: 100 INJECTION, SOLUTION INTRAVENOUS; SUBCUTANEOUS at 17:15

## 2022-08-18 RX ADMIN — THIAMINE HYDROCHLORIDE 100 MG: 100 INJECTION, SOLUTION INTRAMUSCULAR; INTRAVENOUS at 08:36

## 2022-08-18 RX ADMIN — INSULIN LISPRO 1 UNITS: 100 INJECTION, SOLUTION INTRAVENOUS; SUBCUTANEOUS at 11:54

## 2022-08-18 RX ADMIN — SERTRALINE HYDROCHLORIDE 100 MG: 25 TABLET ORAL at 08:36

## 2022-08-18 RX ADMIN — SOYBEAN OIL 250 ML: 20 INJECTION, SOLUTION INTRAVENOUS at 17:15

## 2022-08-18 RX ADMIN — CIPROFLOXACIN 250 MG: 500 TABLET, FILM COATED ORAL at 08:37

## 2022-08-18 RX ADMIN — METRONIDAZOLE 250 MG: 500 TABLET ORAL at 06:02

## 2022-08-18 RX ADMIN — ROPINIROLE HYDROCHLORIDE 0.5 MG: 0.5 TABLET, FILM COATED ORAL at 21:25

## 2022-08-18 RX ADMIN — SODIUM CHLORIDE, PRESERVATIVE FREE 10 ML: 5 INJECTION INTRAVENOUS at 21:33

## 2022-08-18 RX ADMIN — MAGNESIUM SULFATE HEPTAHYDRATE: 500 INJECTION, SOLUTION INTRAMUSCULAR; INTRAVENOUS at 17:14

## 2022-08-18 RX ADMIN — PANTOPRAZOLE SODIUM 40 MG: 40 TABLET, DELAYED RELEASE ORAL at 06:02

## 2022-08-18 ASSESSMENT — PAIN DESCRIPTION - DESCRIPTORS
DESCRIPTORS: ACHING

## 2022-08-18 ASSESSMENT — PAIN DESCRIPTION - LOCATION
LOCATION: ABDOMEN

## 2022-08-18 ASSESSMENT — PAIN SCALES - GENERAL
PAINLEVEL_OUTOF10: 10
PAINLEVEL_OUTOF10: 7
PAINLEVEL_OUTOF10: 0
PAINLEVEL_OUTOF10: 3
PAINLEVEL_OUTOF10: 8
PAINLEVEL_OUTOF10: 0
PAINLEVEL_OUTOF10: 6
PAINLEVEL_OUTOF10: 7
PAINLEVEL_OUTOF10: 6

## 2022-08-18 ASSESSMENT — PAIN DESCRIPTION - ORIENTATION
ORIENTATION: LOWER
ORIENTATION: UPPER;LOWER
ORIENTATION: UPPER;LOWER

## 2022-08-18 NOTE — PROGRESS NOTES
Clavicles (pectoralis & deltoids), Thigh (quadraceps), Calf (gastrocnemius), Scapula (trapezius), Hand (interosseous)  Fluid Accumulation:  No significant fluid accumulation     Strength:  Not Performed  Nutrition Assessment:  Nutrition History: Pt states she has been having poor po intake x3 weeks PTA consuming bites at meals. She reports taste changes, loss of appetite, and N/V/D. Pt reports ongoing wt loss of the past 2 years weighing 200lbs prior to wt loss. Per EMR, pt weighed 167lbs 6/8/21. Do You Have Any Cultural, Denominational, or Ethnic Food Preferences?: No   Nutrition Background:   Pt with PMH notable for Crohn's disease of large and small bowel, GERD, HTN, OA, and CKD III. Pt presents d/t diarrhea, nausea, abdominal cramping and decreased po intake. Pt admitted with JULIETH and Crohn's flare. GI recommending TPN. Nutrition Interval:  PORT in place - accessed 8/14    Pt seen lying in bed at time of visit. She states she ate most of her chicken noodle soup yesterday. Noted pt had 12 small stools over the past 24hrs per GI note. Pt is now on a clear liquid diet and scheduled for colonoscopy tomorrow. Possible surgical resection pending findings. Continuing TPN.     Abdominal Status (last documented):   Last BM (including prior to admit): 08/15/22, GI Symptoms: Diarrhea 170ml stool in past 24 hrs  Pertinent Medications: wellbutrin, cipro (stopping today), lomotil (4 x daily), ferrous sulfate, SSI (3 units yesterday, none required thus far today), melatonin, flagyl, nystatin, protonix, pravachol, prednisone, thiamine (to complete 8/21)  Continuous: none  PRN: dilaudid po (last admin 8/18), zofran (last admin 8/17)  Electrolyte Replacement: 8/14: magnesium sulfate 2gm x2, sodium phos 15mmol   Pertinent Labs:   Lab Results   Component Value Date/Time     08/18/2022 06:06 AM    K 3.8 08/18/2022 06:06 AM     08/18/2022 06:06 AM    CO2 26 08/18/2022 06:06 AM    BUN 29 08/18/2022 06:06 AM CREATININE 1.00 08/18/2022 06:06 AM    GLUCOSE 207 08/18/2022 06:06 AM    CALCIUM 7.8 08/18/2022 06:06 AM    PHOS 2.3 08/17/2022 04:40 AM    MG 2.1 08/18/2022 06:06 AM      Lab Results   Component Value Date/Time    POCGLU 214 08/18/2022 11:18 AM    POCGLU 174 08/18/2022 08:12 AM    POCGLU 242 08/17/2022 08:43 PM    POCGLU 306 08/17/2022 04:52 PM    POCGLU 189 08/17/2022 11:18 AM    POCGLU 128 08/17/2022 08:04 AM       Labs reviewed and remarkable for stable sodium, K  with slight decrease, mg WNL. Noted to be low yesterday without replacement. Current Nutrition Therapies:  PN-Adult Premix 4.25/5 - Peripheral Line  ADULT DIET; Clear Liquid; No Concentrated sweets, No red dye  Diet NPO Exceptions are: Sips of Water with Meds  Current Parenteral Nutrition Orders:  Type and Formula: Premix Central (Dex 5%; 4.25% AA)   Lipids: 250ml, Daily  Duration: Continuous  Rate/Volume: 85ml/hr (2L)  Current PN Order Provides: infusing per order  Goal PN Orders Provides: 580 kcal/d (45% of needs), 85 grams of protein/d (108% of needs), 100 grams of CHO/d and 2000 ml of total volume/d    Current Intake:   Average Meal Intake: 51-75% Average Supplements Intake: 26-50%      Anthropometric Measures:  Height: 5' 3\" (160 cm)  Current Body Wt: 145 lb 1 oz (65.8 kg) (8/18), Weight source: Bed Scale  BMI: 25.7, Normal Weight (BMI 18.5-24. 9)  Admission Body Weight: 133 lb 13.1 oz (60.7 kg) (8/9; bed scale)  Ideal Body Weight (Kg) (Calculated): 52 kg (115 lbs), 116.4 %  Usual Body Wt: 167 lb (75.8 kg) (6/8/21; MD office visit), Percent weight change: -19.9       Edema:Peripheral Vascular  Peripheral Vascular (WDL): Within Defined Limits   BMI Category Normal Weight (BMI 18.5-24. 9)  Estimated Daily Nutrient Needs:  Energy (kcal/day): 2247-4924 (Kcal/kg (25-30) Weight used:   Current (60.7kg)  Protein (g/day): 61-79 (1-1.3 g/kg) Weight Used: (Current) 61 kg (8/10)  Fluid (ml/day):   (1 ml/kcal)    Nutrition Diagnosis:   Inadequate oral intake related to altered GI function, altered taste perception (loss of appetite, food preferences) as evidenced by  (pt reported barriers, po <25% needs, reports significant gi losses)  Moderate malnutrition, In context of chronic illness related to inadequate protein-energy intake as evidenced by Criteria as identified in malnutrition assessment  Nutrition Interventions:   Food and/or Nutrient Delivery: Continue Current Diet, Modify Parenteral Nutrition     Coordination of Nutrition Care: Continue to monitor while inpatient  Plan of Care discussed with: Girish Mills RN    Goals:   Previous Goal Met: Progress towards Goal(s) Declining  Active Goal:  (Maintain nutrition needs via TPN until able to meet >50% estimated nutrition needs via po.)       Nutrition Monitoring and Evaluation:      Food/Nutrient Intake Outcomes: Food and Nutrient Intake, Supplement Intake, Parenteral Nutrition Intake/Tolerance  Physical Signs/Symptoms Outcomes: Biochemical Data, GI Status, Fluid Status or Edema, Meal Time Behavior, Weight    Discharge Planning:     Too soon to determine    Elvira Winters MS, RDN, LD  Contact: [phone]

## 2022-08-18 NOTE — PROGRESS NOTES
Pt resting in bed. Pt awakens when spoken to. Pt complaints of pain 7/10 to abd. And reports pain is better after taking pain meds. Pt reports 2 BM during the night. Stool Output approximately 50 cc in collection container this morning. No distress noted at this time. Pt on RA. Call light in reach, will monitor.

## 2022-08-18 NOTE — PROGRESS NOTES
Pt resting in bed with eyes closed. No s/sx of distress noted at this time. Good po intake with clear liquids today. Pt tolerated well. Call light in reach, will monitor.

## 2022-08-18 NOTE — PROGRESS NOTES
Patient resting in bed on room air. A&Ox4. Respirations even and unlabored. Patient reports pain, dilaudid given, see MAR. TPN infusing at 85mL/hr. No needs expressed. Call light within reach, will continue to monitor.

## 2022-08-18 NOTE — PROGRESS NOTES
[facility] Progress Note         Admit Date:  8/8/2022    Today's Date:  8/18/2022    CC:  Crohn's disease, abdominal pain, diarrhea    Subjective:     Counted 12 small stools in last 24 hours she had logged in a notebook at bedside. 170 cc documented. Still c/o abd pain, responds to dilaudid.     Medications:   Current Facility-Administered Medications   Medication Dose Route Frequency    bisacodyl (DULCOLAX) EC tablet 20 mg  20 mg Oral Once    polyethylene glycol (GLYCOLAX) powder 238 g  238 g Oral Once    HYDROmorphone (DILAUDID) tablet 1 mg  1 mg Oral Q4H PRN    PN-Adult Premix 4.25/5 - Peripheral Line   IntraVENous Continuous TPN    predniSONE (DELTASONE) tablet 60 mg  60 mg Oral Daily    thiamine (B-1) injection 100 mg  100 mg IntraVENous Daily    potassium chloride 20 mEq/50 mL IVPB (Central Line)  20 mEq IntraVENous PRN    Or    potassium chloride 10 mEq/100 mL IVPB (Peripheral Line)  10 mEq IntraVENous PRN    magnesium sulfate 2000 mg in 50 mL IVPB premix  2,000 mg IntraVENous PRN    sodium phosphate 10 mmol in sodium chloride 0.9 % 250 mL IVPB  10 mmol IntraVENous PRN    Or    sodium phosphate 15 mmol in sodium chloride 0.9 % 250 mL IVPB  15 mmol IntraVENous PRN    Or    sodium phosphate 20 mmol in sodium chloride 0.9 % 500 mL IVPB  20 mmol IntraVENous PRN    ciprofloxacin (CIPRO) tablet 250 mg  250 mg Oral 2 times per day    metroNIDAZOLE (FLAGYL) tablet 250 mg  250 mg Oral 3 times per day    diatrizoate meglumine-sodium (GASTROGRAFIN) 66-10 % solution 15 mL  15 mL Oral ONCE PRN    [Held by provider] ferrous sulfate (IRON 325) tablet 325 mg  325 mg Oral BID WC    melatonin tablet 9 mg  9 mg Oral Nightly    traMADol (ULTRAM) tablet 50 mg  50 mg Oral Q6H PRN    Or    traMADol (ULTRAM) tablet 100 mg  100 mg Oral Q6H PRN    insulin lispro (HUMALOG) injection vial 0-4 Units  0-4 Units SubCUTAneous TID WC    insulin lispro (HUMALOG) injection vial 0-4 Units  0-4 Units SubCUTAneous Nightly glucose chewable tablet 16 g  4 tablet Oral PRN    dextrose bolus 10% 125 mL  125 mL IntraVENous PRN    Or    dextrose bolus 10% 250 mL  250 mL IntraVENous PRN    glucagon (rDNA) injection 1 mg  1 mg SubCUTAneous PRN    dextrose 10 % infusion   IntraVENous Continuous PRN    hyoscyamine (LEVSIN/SL) sublingual tablet 125 mcg  125 mcg SubLINGual TID    diphenoxylate-atropine (LOMOTIL) 2.5-0.025 MG per tablet 1 tablet  1 tablet Oral 4x Daily    buPROPion (WELLBUTRIN SR) extended release tablet 150 mg  150 mg Oral QAM    pantoprazole (PROTONIX) tablet 40 mg  40 mg Oral QAM AC    pravastatin (PRAVACHOL) tablet 80 mg  80 mg Oral Nightly    sertraline (ZOLOFT) tablet 100 mg  100 mg Oral Daily    sodium chloride flush 0.9 % injection 5-40 mL  5-40 mL IntraVENous 2 times per day    sodium chloride flush 0.9 % injection 5-40 mL  5-40 mL IntraVENous PRN    0.9 % sodium chloride infusion   IntraVENous PRN    ondansetron (ZOFRAN-ODT) disintegrating tablet 4 mg  4 mg Oral Q8H PRN    Or    ondansetron (ZOFRAN) injection 4 mg  4 mg IntraVENous Q6H PRN    polyethylene glycol (GLYCOLAX) packet 17 g  17 g Oral Daily PRN    acetaminophen (TYLENOL) tablet 650 mg  650 mg Oral Q6H PRN    Or    acetaminophen (TYLENOL) suppository 650 mg  650 mg Rectal Q6H PRN    nystatin (MYCOSTATIN) [id number] UNIT/ML suspension 500,000 Units  5 mL Oral 4x Daily    morphine injection 2 mg  2 mg IntraVENous Q4H PRN    rOPINIRole (REQUIP) tablet 0.5 mg  0.5 mg Oral Nightly       Review of Systems:  ROS was obtained, with pertinent positives as listed above. No chest pain or SOB. Diet:  regular diet    Objective:   Vitals:  /77   Pulse 79   Temp 97.7 °F (36.5 °C) (Oral)   Resp 19   Ht 5' 3\" (1.6 m)   Wt 145 lb 1 oz (65.8 kg)   SpO2 99%   BMI 25.70 kg/m²   Intake/Output:  No intake/output data recorded.   08/16 1901 - 08/18 0700  In: 340 [P.O.:340]  Out: 375   Exam:  General appearance: alert, cooperative, no distress, moon facies noted  Lungs: clear to auscultation bilaterally anteriorly  Heart: regular rate and rhythm  Abdomen: soft, mildly tender periumbilical . Bowel sounds normal. No masses, no organomegaly  Extremities: extremities normal, atraumatic, no cyanosis or edema  Neuro:  alert and oriented    Data Review (Labs):    Recent Labs     08/16/22  0559 08/17/22  0440 08/18/22  0606   HGB  --  7.7*  --    HCT  --  23.3*  --    * 136 136   K 5.5* 4.1 3.8    107 106   CO2 21 24 26   BUN 19 20 29*   CREATININE 1.10* 1.10* 1.00   CALCIUM 7.3* 7.8* 7.8*   MG 1.8 1.7*  --    PHOS 4.8* 2.3  --    GLUCOSE 273* 116* 207*       Assessment:     Principal Problem:    JULIETH (acute kidney injury) ([facility] [address]  Active Problems:    Crohn's disease (HCC)    Hyponatremia    Hypertension    Hyperlipidemia    Depression    Moderate protein-calorie malnutrition (HCC)    Iron deficiency anemia due to chronic blood loss    RLS (restless legs syndrome)    CKD (chronic kidney disease) stage 3, GFR 30-59 ml/min (HCC)    Colitis    Anemia    Essential hypertension, benign  Resolved Problems:    Hyperkalemia    Acute hypokalemia    65 y.o. female with PMH including but not limited to crohns disease (lg and sm bowel), HTN, CKD3, depression, HLP, RLS, who we are following for Crohn's flare with abdominal cramping, nausea, rectal spasm, non-bloody diarrhea, dehydration, and wt loss. She has failed Remicade, Humira, Entyvio, Stelara. Currenlty on [place] but not in remission. Recent Cdiff neg, Calprotectin >1200 and CT evidence of bowel thickening cw CD. Has electrolyte imbalance and JULIETH on admission.      8/10/22:  Renal function improved  Sxs better  Mag and k low  Iron low     8/11/22  Almost ready for discharge  Cont ivf for JULIETH  Switch to prednisone  Tramadol prn     8/12/22  Did not tolerate po switch  Afraid if she goes home she will be right back  Creat better  Does not like to take pain meds     8/13/22  Not any better  Need to consider TPN  There is a stool organism

## 2022-08-18 NOTE — PROGRESS NOTES
Hospitalist Progress Note   Admit Date:  2022  3:17 PM   Name:  Eliot Hinds   Age:  65 y.o. Sex:  female  :  1960   MRN:  [id number]   Room:  2/    Presenting Complaint: Nausea     Reason(s) for Admission: Dehydration [E86.0]  Hypokalemia [E87.6]  JULIETH (acute kidney injury) ([facility] [address] [N17.9]  Diarrhea, unspecified type [R19.7]  Acute renal failure superimposed on chronic kidney disease, unspecified CKD stage, unspecified acute renal failure type ([facility] [address] [N17.9, N18.9]     Hospital Course & Interval History:     Please refer to the admission H&P for details of presentation. In summary, Eliot Hinds is a 65 y.o. female with medical history significant for crohns disease, HTN, CKD3, depression, HLP who was admitted for several weeks of nausea, emesis and abdominal pain. GI was consulted due to concerns for crohns flare up. Patient was started on IV steroids. Patient symptoms improved initially but worsened after transitioning to PO steroids. CT A/P on  was done due to continued abdominal pain. It showed circumferential wall thickening of colon possibly suggestive of colitis, therefore she was started on cipro and flagyl. Patient also had multiple bouts of diarrhea but was unable to be verified by staff. Stool studies including C. difficile has been negative. Patient continues to have abdominal pain frequent episodes of diarrhea/BM although stool amount has decreased. IV steroids has been switched to p.o. steroids improvement in her symptoms. GI planning on doing colonoscopy given ongoing symptoms. Subjective/24 hr Events (22) : Patient is seen and examined at bedside. No acute events reported overnight by nursing staff. Tolerating p.o. diet but continues to have abdominal pain as well as frequent bowel movements. Pain being controlled with PO pain meds. Overall improved but given ongoing symptoms, GI planned for colonoscopy.   Patient denies fever, chills, chest Collection Time: 08/16/22 11:53 AM   Result Value Ref Range    POC Glucose 238 (H) 65 - 100 mg/dL    Performed by: Joseph    POCT Glucose    Collection Time: 08/16/22  4:31 PM   Result Value Ref Range    POC Glucose 214 (H) 65 - 100 mg/dL    Performed by: KristanCT    POCT Glucose    Collection Time: 08/16/22  9:00 PM   Result Value Ref Range    POC Glucose 137 (H) 65 - 100 mg/dL    Performed by: Sophia    Phosphorus    Collection Time: 08/17/22  4:40 AM   Result Value Ref Range    Phosphorus 2.3 2.3 - 3.7 MG/DL   Basic Metabolic Panel w/ Reflex to MG    Collection Time: 08/17/22  4:40 AM   Result Value Ref Range    Sodium 136 136 - 145 mmol/L    Potassium 4.1 3.5 - 5.1 mmol/L    Chloride 107 98 - 107 mmol/L    CO2 24 21 - 32 mmol/L    Anion Gap 5 (L) 7 - 16 mmol/L    Glucose 116 (H) 65 - 100 mg/dL    BUN 20 8 - 23 MG/DL    Creatinine 1.10 (H) 0.6 - 1.0 MG/DL    GFR African American >60 >60 ml/min/1.73m2    GFR Non- 54 (L) >60 ml/min/1.73m2    Calcium 7.8 (L) 8.3 - 10.4 MG/DL   Hemoglobin and Hematocrit    Collection Time: 08/17/22  4:40 AM   Result Value Ref Range    Hemoglobin 7.7 (L) 11.7 - 15.4 g/dL    Hematocrit 23.3 (L) 35.8 - 46.3 %   Magnesium    Collection Time: 08/17/22  4:40 AM   Result Value Ref Range    Magnesium 1.7 (L) 1.8 - 2.4 mg/dL   POCT Glucose    Collection Time: 08/17/22  8:04 AM   Result Value Ref Range    POC Glucose 128 (H) 65 - 100 mg/dL    Performed by: KristanCT    POCT Glucose    Collection Time: 08/17/22 11:18 AM   Result Value Ref Range    POC Glucose 189 (H) 65 - 100 mg/dL    Performed by: Joseph    POCT Glucose    Collection Time: 08/17/22  4:52 PM   Result Value Ref Range    POC Glucose 306 (H) 65 - 100 mg/dL    Performed by: Joseph    POCT Glucose    Collection Time: 08/17/22  8:43 PM   Result Value Ref Range    POC Glucose 242 (H) 65 - 100 mg/dL    Performed by: AcorLCJohnPCT    Basic Metabolic Panel w/ Reflex to MG    Collection Time: 08/18/22  6:06 AM   Result Value Ref Range    Sodium 136 136 - 145 mmol/L    Potassium 3.8 3.5 - 5.1 mmol/L    Chloride 106 98 - 107 mmol/L    CO2 26 21 - 32 mmol/L    Anion Gap 4 (L) 7 - 16 mmol/L    Glucose 207 (H) 65 - 100 mg/dL    BUN 29 (H) 8 - 23 MG/DL    Creatinine 1.00 0.6 - 1.0 MG/DL    GFR African American >60 >60 ml/min/1.73m2    GFR Non- 60 (L) >60 ml/min/1.73m2    Calcium 7.8 (L) 8.3 - 10.4 MG/DL   Magnesium    Collection Time: 08/18/22  6:06 AM   Result Value Ref Range    Magnesium 2.1 1.8 - 2.4 mg/dL   POCT Glucose    Collection Time: 08/18/22  8:12 AM   Result Value Ref Range    POC Glucose 174 (H) 65 - 100 mg/dL    Performed by: Joseph        I have personally reviewed imaging studies showing: Other Studies:  CT ABDOMEN PELVIS W IV CONTRAST Additional Contrast? Oral   Final Result   1. Circumferential wall thickening over a long segment of colon involving the   distal transverse, descending and sigmoid colon. The apparent thickening may be   accentuated by incomplete luminal distention. Colitis could be considered in the   setting of abdominal pain. 2. Hydropic gallbladder, no gallstones or obvious gallbladder wall thickening. CT ABDOMEN PELVIS RENAL STONE Additional Contrast? None   Final Result   1. No acute abdominal or pelvic abnormality noted on this limited examination   without the benefit of intravenous or oral contrast. Specifically, no renal or   ureteral stones present. There is no hydronephrosis or hydroureter. US RETROPERITONEAL COMPLETE   Final Result      Mild right hydronephrosis, of uncertain etiology. Otherwise, unremarkable   ultrasound of the kidneys.                 Current Meds:  Current Facility-Administered Medications   Medication Dose Route Frequency    bisacodyl (DULCOLAX) EC tablet 20 mg  20 mg Oral Once    polyethylene glycol (GLYCOLAX) powder 238 g  238 g Oral Once HYDROmorphone (DILAUDID) tablet 1 mg  1 mg Oral Q4H PRN    PN-Adult Premix 4.25/5 - Peripheral Line   IntraVENous Continuous TPN    predniSONE (DELTASONE) tablet 60 mg  60 mg Oral Daily    thiamine (B-1) injection 100 mg  100 mg IntraVENous Daily    potassium chloride 20 mEq/50 mL IVPB (Central Line)  20 mEq IntraVENous PRN    Or    potassium chloride 10 mEq/100 mL IVPB (Peripheral Line)  10 mEq IntraVENous PRN    magnesium sulfate 2000 mg in 50 mL IVPB premix  2,000 mg IntraVENous PRN    sodium phosphate 10 mmol in sodium chloride 0.9 % 250 mL IVPB  10 mmol IntraVENous PRN    Or    sodium phosphate 15 mmol in sodium chloride 0.9 % 250 mL IVPB  15 mmol IntraVENous PRN    Or    sodium phosphate 20 mmol in sodium chloride 0.9 % 500 mL IVPB  20 mmol IntraVENous PRN    ciprofloxacin (CIPRO) tablet 250 mg  250 mg Oral 2 times per day    metroNIDAZOLE (FLAGYL) tablet 250 mg  250 mg Oral 3 times per day    diatrizoate meglumine-sodium (GASTROGRAFIN) 66-10 % solution 15 mL  15 mL Oral ONCE PRN    [Held by provider] ferrous sulfate (IRON 325) tablet 325 mg  325 mg Oral BID WC    melatonin tablet 9 mg  9 mg Oral Nightly    traMADol (ULTRAM) tablet 50 mg  50 mg Oral Q6H PRN    Or    traMADol (ULTRAM) tablet 100 mg  100 mg Oral Q6H PRN    insulin lispro (HUMALOG) injection vial 0-4 Units  0-4 Units SubCUTAneous TID WC    insulin lispro (HUMALOG) injection vial 0-4 Units  0-4 Units SubCUTAneous Nightly    glucose chewable tablet 16 g  4 tablet Oral PRN    dextrose bolus 10% 125 mL  125 mL IntraVENous PRN    Or    dextrose bolus 10% 250 mL  250 mL IntraVENous PRN    glucagon (rDNA) injection 1 mg  1 mg SubCUTAneous PRN    dextrose 10 % infusion   IntraVENous Continuous PRN    hyoscyamine (LEVSIN/SL) sublingual tablet 125 mcg  125 mcg SubLINGual TID    [Held by provider] diphenoxylate-atropine (LOMOTIL) 2.5-0.025 MG per tablet 1 tablet  1 tablet Oral 4x Daily    buPROPion [facility]) extended release tablet 150 mg  150 mg Oral

## 2022-08-19 ENCOUNTER — ANESTHESIA (OUTPATIENT)
Dept: ENDOSCOPY | Age: 62
DRG: 386 | End: 2022-08-19
Payer: COMMERCIAL

## 2022-08-19 ENCOUNTER — ANESTHESIA EVENT (OUTPATIENT)
Dept: ENDOSCOPY | Age: 62
DRG: 386 | End: 2022-08-19
Payer: COMMERCIAL

## 2022-08-19 LAB
ANION GAP SERPL CALC-SCNC: 6 MMOL/L (ref 7–16)
BASOPHILS # BLD: 0 K/UL (ref 0–0.2)
BASOPHILS NFR BLD: 0 % (ref 0–2)
BUN SERPL-MCNC: 28 MG/DL (ref 8–23)
CALCIUM SERPL-MCNC: 8.1 MG/DL (ref 8.3–10.4)
CHLORIDE SERPL-SCNC: 104 MMOL/L (ref 98–107)
CO2 SERPL-SCNC: 25 MMOL/L (ref 21–32)
CREAT SERPL-MCNC: 1 MG/DL (ref 0.6–1)
DIFFERENTIAL METHOD BLD: ABNORMAL
EOSINOPHIL # BLD: 0 K/UL (ref 0–0.8)
EOSINOPHIL NFR BLD: 0 % (ref 0.5–7.8)
ERYTHROCYTE [DISTWIDTH] IN BLOOD BY AUTOMATED COUNT: 13.8 % (ref 11.9–14.6)
GLUCOSE BLD STRIP.AUTO-MCNC: 173 MG/DL (ref 65–100)
GLUCOSE BLD STRIP.AUTO-MCNC: 299 MG/DL (ref 65–100)
GLUCOSE BLD STRIP.AUTO-MCNC: 352 MG/DL (ref 65–100)
GLUCOSE SERPL-MCNC: 179 MG/DL (ref 65–100)
HCT VFR BLD AUTO: 23.6 % (ref 35.8–46.3)
HGB BLD-MCNC: 7.8 G/DL (ref 11.7–15.4)
IMM GRANULOCYTES # BLD AUTO: 0.2 K/UL (ref 0–0.5)
IMM GRANULOCYTES NFR BLD AUTO: 3 % (ref 0–5)
LYMPHOCYTES # BLD: 0.8 K/UL (ref 0.5–4.6)
LYMPHOCYTES NFR BLD: 9 % (ref 13–44)
MAGNESIUM SERPL-MCNC: 2.2 MG/DL (ref 1.8–2.4)
MCH RBC QN AUTO: 30.4 PG (ref 26.1–32.9)
MCHC RBC AUTO-ENTMCNC: 33.1 G/DL (ref 31.4–35)
MCV RBC AUTO: 91.8 FL (ref 79.6–97.8)
MONOCYTES # BLD: 0.7 K/UL (ref 0.1–1.3)
MONOCYTES NFR BLD: 8 % (ref 4–12)
NEUTS SEG # BLD: 6.5 K/UL (ref 1.7–8.2)
NEUTS SEG NFR BLD: 80 % (ref 43–78)
NRBC # BLD: 0 K/UL (ref 0–0.2)
PHOSPHATE SERPL-MCNC: 2 MG/DL (ref 2.3–3.7)
PLATELET # BLD AUTO: 176 K/UL (ref 150–450)
PMV BLD AUTO: 8.1 FL (ref 9.4–12.3)
POTASSIUM SERPL-SCNC: 3.7 MMOL/L (ref 3.5–5.1)
RBC # BLD AUTO: 2.57 M/UL (ref 4.05–5.2)
SERVICE CMNT-IMP: ABNORMAL
SODIUM SERPL-SCNC: 135 MMOL/L (ref 136–145)
WBC # BLD AUTO: 8.2 K/UL (ref 4.3–11.1)

## 2022-08-19 PROCEDURE — 6370000000 HC RX 637 (ALT 250 FOR IP): Performed by: INTERNAL MEDICINE

## 2022-08-19 PROCEDURE — 80048 BASIC METABOLIC PNL TOTAL CA: CPT

## 2022-08-19 PROCEDURE — 2500000003 HC RX 250 WO HCPCS: Performed by: INTERNAL MEDICINE

## 2022-08-19 PROCEDURE — 7100000011 HC PHASE II RECOVERY - ADDTL 15 MIN: Performed by: STUDENT IN AN ORGANIZED HEALTH CARE EDUCATION/TRAINING PROGRAM

## 2022-08-19 PROCEDURE — 88305 TISSUE EXAM BY PATHOLOGIST: CPT

## 2022-08-19 PROCEDURE — 2500000003 HC RX 250 WO HCPCS

## 2022-08-19 PROCEDURE — 7100000010 HC PHASE II RECOVERY - FIRST 15 MIN: Performed by: STUDENT IN AN ORGANIZED HEALTH CARE EDUCATION/TRAINING PROGRAM

## 2022-08-19 PROCEDURE — 1100000000 HC RM PRIVATE

## 2022-08-19 PROCEDURE — 85025 COMPLETE CBC W/AUTO DIFF WBC: CPT

## 2022-08-19 PROCEDURE — 6370000000 HC RX 637 (ALT 250 FOR IP): Performed by: NURSE PRACTITIONER

## 2022-08-19 PROCEDURE — 84100 ASSAY OF PHOSPHORUS: CPT

## 2022-08-19 PROCEDURE — 82962 GLUCOSE BLOOD TEST: CPT

## 2022-08-19 PROCEDURE — 2580000003 HC RX 258

## 2022-08-19 PROCEDURE — 0DBB8ZX EXCISION OF ILEUM, VIA NATURAL OR ARTIFICIAL OPENING ENDOSCOPIC, DIAGNOSTIC: ICD-10-PCS | Performed by: STUDENT IN AN ORGANIZED HEALTH CARE EDUCATION/TRAINING PROGRAM

## 2022-08-19 PROCEDURE — 3700000000 HC ANESTHESIA ATTENDED CARE: Performed by: STUDENT IN AN ORGANIZED HEALTH CARE EDUCATION/TRAINING PROGRAM

## 2022-08-19 PROCEDURE — 83735 ASSAY OF MAGNESIUM: CPT

## 2022-08-19 PROCEDURE — 3609010300 HC COLONOSCOPY W/BIOPSY SINGLE/MULTIPLE: Performed by: STUDENT IN AN ORGANIZED HEALTH CARE EDUCATION/TRAINING PROGRAM

## 2022-08-19 PROCEDURE — 6360000002 HC RX W HCPCS

## 2022-08-19 PROCEDURE — 3700000001 HC ADD 15 MINUTES (ANESTHESIA): Performed by: STUDENT IN AN ORGANIZED HEALTH CARE EDUCATION/TRAINING PROGRAM

## 2022-08-19 PROCEDURE — 6360000002 HC RX W HCPCS: Performed by: INTERNAL MEDICINE

## 2022-08-19 PROCEDURE — 36591 DRAW BLOOD OFF VENOUS DEVICE: CPT

## 2022-08-19 PROCEDURE — 2580000003 HC RX 258: Performed by: INTERNAL MEDICINE

## 2022-08-19 PROCEDURE — 6370000000 HC RX 637 (ALT 250 FOR IP): Performed by: HOSPITALIST

## 2022-08-19 PROCEDURE — 6360000002 HC RX W HCPCS: Performed by: STUDENT IN AN ORGANIZED HEALTH CARE EDUCATION/TRAINING PROGRAM

## 2022-08-19 PROCEDURE — 83036 HEMOGLOBIN GLYCOSYLATED A1C: CPT

## 2022-08-19 PROCEDURE — 2709999900 HC NON-CHARGEABLE SUPPLY: Performed by: STUDENT IN AN ORGANIZED HEALTH CARE EDUCATION/TRAINING PROGRAM

## 2022-08-19 PROCEDURE — 6370000000 HC RX 637 (ALT 250 FOR IP): Performed by: PHYSICIAN ASSISTANT

## 2022-08-19 RX ORDER — METHYLPREDNISOLONE SODIUM SUCCINATE 40 MG/ML
20 INJECTION, POWDER, LYOPHILIZED, FOR SOLUTION INTRAMUSCULAR; INTRAVENOUS EVERY 8 HOURS
Status: DISCONTINUED | OUTPATIENT
Start: 2022-08-19 | End: 2022-08-24 | Stop reason: HOSPADM

## 2022-08-19 RX ORDER — PROPOFOL 10 MG/ML
INJECTION, EMULSION INTRAVENOUS CONTINUOUS PRN
Status: DISCONTINUED | OUTPATIENT
Start: 2022-08-19 | End: 2022-08-19 | Stop reason: SDUPTHER

## 2022-08-19 RX ORDER — SODIUM CHLORIDE, SODIUM LACTATE, POTASSIUM CHLORIDE, CALCIUM CHLORIDE 600; 310; 30; 20 MG/100ML; MG/100ML; MG/100ML; MG/100ML
INJECTION, SOLUTION INTRAVENOUS CONTINUOUS PRN
Status: DISCONTINUED | OUTPATIENT
Start: 2022-08-19 | End: 2022-08-19 | Stop reason: SDUPTHER

## 2022-08-19 RX ORDER — PROPOFOL 10 MG/ML
INJECTION, EMULSION INTRAVENOUS PRN
Status: DISCONTINUED | OUTPATIENT
Start: 2022-08-19 | End: 2022-08-19 | Stop reason: SDUPTHER

## 2022-08-19 RX ORDER — LIDOCAINE HYDROCHLORIDE 20 MG/ML
INJECTION, SOLUTION EPIDURAL; INFILTRATION; INTRACAUDAL; PERINEURAL PRN
Status: DISCONTINUED | OUTPATIENT
Start: 2022-08-19 | End: 2022-08-19 | Stop reason: SDUPTHER

## 2022-08-19 RX ADMIN — SOYBEAN OIL 250 ML: 20 INJECTION, SOLUTION INTRAVENOUS at 16:58

## 2022-08-19 RX ADMIN — HYOSCYAMINE SULFATE 125 MCG: 0.12 TABLET ORAL at 13:28

## 2022-08-19 RX ADMIN — METHYLPREDNISOLONE SODIUM SUCCINATE 20 MG: 40 INJECTION, POWDER, FOR SOLUTION INTRAMUSCULAR; INTRAVENOUS at 21:11

## 2022-08-19 RX ADMIN — SODIUM CHLORIDE, PRESERVATIVE FREE 5 ML: 5 INJECTION INTRAVENOUS at 21:15

## 2022-08-19 RX ADMIN — LIDOCAINE HYDROCHLORIDE 50 MG: 20 INJECTION, SOLUTION EPIDURAL; INFILTRATION; INTRACAUDAL; PERINEURAL at 11:43

## 2022-08-19 RX ADMIN — HYDROMORPHONE HYDROCHLORIDE 1 MG: 2 TABLET ORAL at 21:10

## 2022-08-19 RX ADMIN — NYSTATIN 500000 UNITS: 100000 SUSPENSION ORAL at 21:11

## 2022-08-19 RX ADMIN — Medication 9 MG: at 21:10

## 2022-08-19 RX ADMIN — NYSTATIN 500000 UNITS: 100000 SUSPENSION ORAL at 16:57

## 2022-08-19 RX ADMIN — PROPOFOL 180 MCG/KG/MIN: 10 INJECTION, EMULSION INTRAVENOUS at 11:43

## 2022-08-19 RX ADMIN — PROPOFOL 20 MG: 10 INJECTION, EMULSION INTRAVENOUS at 11:46

## 2022-08-19 RX ADMIN — SERTRALINE HYDROCHLORIDE 100 MG: 25 TABLET ORAL at 08:19

## 2022-08-19 RX ADMIN — BUPROPION HYDROCHLORIDE 150 MG: 150 TABLET, EXTENDED RELEASE ORAL at 08:19

## 2022-08-19 RX ADMIN — HYDROMORPHONE HYDROCHLORIDE 1 MG: 2 TABLET ORAL at 13:29

## 2022-08-19 RX ADMIN — MAGNESIUM SULFATE HEPTAHYDRATE: 500 INJECTION, SOLUTION INTRAMUSCULAR; INTRAVENOUS at 16:57

## 2022-08-19 RX ADMIN — PROPOFOL 30 MG: 10 INJECTION, EMULSION INTRAVENOUS at 11:44

## 2022-08-19 RX ADMIN — HYOSCYAMINE SULFATE 125 MCG: 0.12 TABLET ORAL at 21:11

## 2022-08-19 RX ADMIN — PROPOFOL 50 MG: 10 INJECTION, EMULSION INTRAVENOUS at 11:43

## 2022-08-19 RX ADMIN — SODIUM CHLORIDE, SODIUM LACTATE, POTASSIUM CHLORIDE, AND CALCIUM CHLORIDE: 600; 310; 30; 20 INJECTION, SOLUTION INTRAVENOUS at 11:03

## 2022-08-19 RX ADMIN — SODIUM PHOSPHATE, MONOBASIC, MONOHYDRATE 15 MMOL: 276; 142 INJECTION, SOLUTION INTRAVENOUS at 16:57

## 2022-08-19 RX ADMIN — SODIUM CHLORIDE, PRESERVATIVE FREE 5 ML: 5 INJECTION INTRAVENOUS at 08:19

## 2022-08-19 RX ADMIN — ROPINIROLE HYDROCHLORIDE 0.5 MG: 0.5 TABLET, FILM COATED ORAL at 21:11

## 2022-08-19 RX ADMIN — HYOSCYAMINE SULFATE 125 MCG: 0.12 TABLET ORAL at 08:19

## 2022-08-19 RX ADMIN — PANTOPRAZOLE SODIUM 40 MG: 40 TABLET, DELAYED RELEASE ORAL at 06:12

## 2022-08-19 RX ADMIN — INSULIN LISPRO 4 UNITS: 100 INJECTION, SOLUTION INTRAVENOUS; SUBCUTANEOUS at 16:58

## 2022-08-19 RX ADMIN — PREDNISONE 60 MG: 20 TABLET ORAL at 08:19

## 2022-08-19 RX ADMIN — METHYLPREDNISOLONE SODIUM SUCCINATE 20 MG: 40 INJECTION, POWDER, FOR SOLUTION INTRAMUSCULAR; INTRAVENOUS at 13:28

## 2022-08-19 RX ADMIN — THIAMINE HYDROCHLORIDE 100 MG: 100 INJECTION, SOLUTION INTRAMUSCULAR; INTRAVENOUS at 08:18

## 2022-08-19 RX ADMIN — PRAVASTATIN SODIUM 80 MG: 80 TABLET ORAL at 21:11

## 2022-08-19 ASSESSMENT — PAIN SCALES - GENERAL
PAINLEVEL_OUTOF10: 0
PAINLEVEL_OUTOF10: 9
PAINLEVEL_OUTOF10: 0
PAINLEVEL_OUTOF10: 9
PAINLEVEL_OUTOF10: 7
PAINLEVEL_OUTOF10: 0
PAINLEVEL_OUTOF10: 0
PAINLEVEL_OUTOF10: 7

## 2022-08-19 ASSESSMENT — PAIN - FUNCTIONAL ASSESSMENT
PAIN_FUNCTIONAL_ASSESSMENT: NONE - DENIES PAIN
PAIN_FUNCTIONAL_ASSESSMENT: 0-10

## 2022-08-19 ASSESSMENT — PAIN DESCRIPTION - DESCRIPTORS
DESCRIPTORS: DULL;ACHING
DESCRIPTORS: ACHING

## 2022-08-19 ASSESSMENT — PAIN DESCRIPTION - LOCATION
LOCATION: ABDOMEN
LOCATION: ABDOMEN

## 2022-08-19 ASSESSMENT — PAIN DESCRIPTION - ORIENTATION: ORIENTATION: LOWER;UPPER

## 2022-08-19 NOTE — INTERVAL H&P NOTE
Update History & Physical    The patient's History and Physical of August 9, 2022 was reviewed with the patient and I examined the patient. There was no change. The surgical site was confirmed by the patient and me. Plan: The risks, benefits, expected outcome, and alternative to the recommended procedure have been discussed with the patient. Patient understands and wants to proceed with the procedure.      Electronically signed by Deborah Haywood MD on 8/19/2022 at 11:04 AM

## 2022-08-19 NOTE — ANESTHESIA POSTPROCEDURE EVALUATION
Department of Anesthesiology  Postprocedure Note    Patient: Meagan Reza  MRN: [id number]  YOB: 1960  Date of evaluation: 8/19/2022      Procedure Summary     Date: 08/19/22 Room / Location: [facility] ENDO 04 / [facility] ENDOSCOPY    Anesthesia Start: 0023 Anesthesia Stop: 0880    Procedure: COLONOSCOPY WITH BIOPSY (Lower GI Region) Diagnosis:       Chronic diarrhea      Crohn's disease of large intestine with complication ([address]      Colitis      (Chronic diarrhea [K52.9])      (Crohn's disease of large intestine with complication ([address] [U92.093])      (Colitis [K52.9])    Surgeons: Judy Cross MD Responsible Provider: Miriam Figueroa MD    Anesthesia Type: TIVA ASA Status: 3          Anesthesia Type: TIVA    Osvaldo Phase I: Osvaldo Score: 10    Osvaldo Phase II: Osvaldo Score: 10      Anesthesia Post Evaluation    Patient location during evaluation: PACU  Patient participation: complete - patient participated  Level of consciousness: awake  Airway patency: patent  Nausea & Vomiting: no nausea  Complications: no  Cardiovascular status: blood pressure returned to baseline and hemodynamically stable  Respiratory status: acceptable  Hydration status: stable  Multimodal analgesia pain management approach

## 2022-08-19 NOTE — PROGRESS NOTES
TRANSFER - IN REPORT:    Verbal report received from [facility] on Jesenia Balk  being received from  [facility] lab for routine progression of patient care      Report consisted of patient's Situation, Background, Assessment and   Recommendations(SBAR). Information from the following report(s) Nurse Handoff Report was reviewed with the receiving nurse. Opportunity for questions and clarification was provided. Assessment completed upon patient's arrival to unit and care assumed.

## 2022-08-19 NOTE — ANESTHESIA PRE PROCEDURE
Department of Anesthesiology  Preprocedure Note       Name:  Charlie Brink   Age:  65 y.o.  :  1960                                          MRN:  [id number]         Date:  2022      Surgeon: Devaughn Santamaria):  Paul Tay MD    Procedure: Procedure(s):  COLORECTAL CANCER SCREENING, NOT HIGH RISK/26/     Medications prior to admission:   Prior to Admission medications    Medication Sig Start Date End Date Taking? Authorizing Provider   buPROPion (WELLBUTRIN XL) 150 MG extended release tablet Take 150 mg by mouth every morning   Yes Historical Provider, MD   irbesartan-hydroCHLOROthiazide (AVALIDE) 150-12.5 MG per tablet Take 1 tablet by mouth every other day   Yes Historical Provider, MD   traMADol (ULTRAM) 50 MG tablet Take 50 mg by mouth every 8 hours as needed for Pain. Yes Historical Provider, MD   dexlansoprazole (DEXILANT) 60 MG CPDR delayed release capsule Take 60 mg by mouth   Yes Ar Automatic Reconciliation   pravastatin (PRAVACHOL) 80 MG tablet Take 80 mg by mouth   Yes Ar Automatic Reconciliation   rOPINIRole (REQUIP) 0.5 MG tablet Take 0.5 mg by mouth   Yes Ar Automatic Reconciliation   sertraline (ZOLOFT) 100 MG tablet Take 100 mg by mouth   Yes Ar Automatic Reconciliation   acetaminophen (TYLENOL) 650 MG extended release tablet Take 650 mg by mouth every 6 hours as needed    Ar Automatic Reconciliation   LORazepam (ATIVAN) 1 MG tablet Take 1 mg by mouth.     Ar Automatic Reconciliation   Probiotic Product (ACIDOPHILUS PROBIOTIC) CAPS capsule Take 1 tablet by mouth    Ar Automatic Reconciliation   valsartan-hydroCHLOROthiazide (DIOVAN-HCT) 160-25 MG per tablet Take 1 tablet by mouth    Ar Automatic Reconciliation   mesalamine (DELZICOL) 400 MG CPDR delayed release capsule Take 400 mg by mouth 2 times daily  Patient not taking: Reported on 2022  Ar Automatic Reconciliation       Current medications:    Current Facility-Administered Medications   Medication Dose Route 7713    insulin lispro (HUMALOG) injection vial 0-4 Units  0-4 Units SubCUTAneous TID WC Juanita Bennett MD   2 Units at 08/18/22 1715    insulin lispro (HUMALOG) injection vial 0-4 Units  0-4 Units SubCUTAneous Nightly Juanita Bennett MD        glucose chewable tablet 16 g  4 tablet Oral PRN Juanita Bennett MD        dextrose bolus 10% 125 mL  125 mL IntraVENous PRN Juanita Bennett MD        Or    dextrose bolus 10% 250 mL  250 mL IntraVENous PRN Juanita Bennett MD        glucagon (rDNA) injection 1 mg  1 mg SubCUTAneous PRN Juanita Bennett MD        dextrose 10 % infusion   IntraVENous Continuous PRN Juanita Bennett MD        hyoscyamine (LEVSIN/SL) sublingual tablet 125 mcg  125 mcg SubLINGual TID Bakari Call   125 mcg at 08/19/22 0819    [Held by provider] diphenoxylate-atropine (LOMOTIL) 2.5-0.025 MG per tablet 1 tablet  1 tablet Oral 4x Daily Bakari Call   1 tablet at 08/18/22 0836    buPROPion [facility]) extended release tablet 150 mg  150 mg Oral CHRISM Melody Lamar MD   150 mg at 08/19/22 0819    pantoprazole (PROTONIX) tablet 40 mg  40 mg Oral QAM AC Melody Lamar MD   40 mg at 08/19/22 0612    pravastatin (PRAVACHOL) tablet 80 mg  80 mg Oral Nightly Melody Lamar MD   80 mg at 08/18/22 2125    sertraline (ZOLOFT) tablet 100 mg  100 mg Oral Daily Melody Lamar MD   100 mg at 08/19/22 0819    sodium chloride flush 0.9 % injection 5-40 mL  5-40 mL IntraVENous 2 times per day Melody Lamar MD   5 mL at 08/19/22 0819    sodium chloride flush 0.9 % injection 5-40 mL  5-40 mL IntraVENous PRN Melody Lamar MD        0.9 % sodium chloride infusion   IntraVENous PRN Melody Lamar MD        ondansetron (ZOFRAN-ODT) disintegrating tablet 4 mg  4 mg Oral Q8H PRN Melody Lamar MD   4 mg at 08/12/22 0859    Or    ondansetron (ZOFRAN) injection 4 mg  4 mg IntraVENous Q6H PRN Eris Paiz MD Bin   4 mg at 08/18/22 2124    polyethylene glycol (GLYCOLAX) packet 17 g  17 g Oral Daily PRN Clayton Ruggiero MD        acetaminophen (TYLENOL) tablet 650 mg  650 mg Oral Q6H PRN Clayton Ruggiero MD        Or    acetaminophen (TYLENOL) suppository 650 mg  650 mg Rectal Q6H PRN Clayton Ruggiero MD        nystatin (MYCOSTATIN) 320234 UNIT/ML suspension 500,000 Units  5 mL Oral 4x Daily Clayton Ruggiero MD   500,000 Units at 08/18/22 2125    morphine injection 2 mg  2 mg IntraVENous Q4H PRN Clayton Ruggiero MD   2 mg at 08/13/22 1316    rOPINIRole (REQUIP) tablet 0.5 mg  0.5 mg Oral Nightly Stacy Tee MD   0.5 mg at 08/18/22 2125       Allergies: Allergies   Allergen Reactions    Codeine Nausea Only       Problem List:    Patient Active Problem List   Diagnosis Code    Ulcerative colitis ([facility] K51.90    RLS (restless legs syndrome) G25.81    CKD (chronic kidney disease) stage 3, GFR 30-59 ml/min (HCC) N18.30    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 A    Moderate protein-calorie malnutrition (HCC) E44.0    Iron deficiency anemia due to chronic blood loss D50.0       Past Medical History:        Diagnosis Date    Depression     managed with medication     Disc prolapse     L4 to L5    DJD (degenerative joint disease)     GERD (gastroesophageal reflux disease)     managed with medication     Hypertension     managed with medication     Obesity (BMI 30-39. 9)     BMI 31.5    Osteoarthritis     Steroid-induced diabetes mellitus ([facility] [address] 6/5/2013    Steroid-induced diabetes mellitus ([facility] [address] 6/5/2013    no present medication needed     Ulcerative colitis ([facility] [address]     managed with medication        Past Surgical History:        Procedure Laterality Date    COLONOSCOPY  2009    showed UC    FLEXIBLE SIGMOIDOSCOPY N/A 5/11/2016    SIGMOIDOSCOPY FLEXIBLE/   BMI 36 performed by Tania Shane MD at [address] [place] [address]    left mastoidectomy    HEENT  1971    tympanoplasty    INNER EAR SURGERY PROC UNLISTED  62,49,43    mastoid cyst +TM repair-hearing loss, left ear    TONSILLECTOMY  1971         VASCULAR SURGERY  06/5/2013 [place]    port placement    VASCULAR SURGERY  2013    port placement for remicade       Social History:    Social History     Tobacco Use    Smoking status: Every Day     Packs/day: 1.00     Types: Cigarettes    Smokeless tobacco: Never   Substance Use Topics    Alcohol use: No                                Ready to quit: Not Answered  Counseling given: Not Answered      Vital Signs (Current):   Vitals:    08/19/22 0252 08/19/22 0600 08/19/22 0730 08/19/22 1036   BP: 131/77  127/72 138/74   Pulse: 80  80 80   Resp: 20  18 16   Temp: 98 °F (36.7 °C)  98.2 °F (36.8 °C) 97.8 °F (36.6 °C)   TempSrc: Oral  Oral    SpO2: 100%  100% 97%   Weight:  145 lb 4.5 oz (65.9 kg)  145 lb (65.8 kg)   Height:    5' 3\" (1.6 m)                                              BP Readings from Last 3 Encounters:   08/19/22 138/74       NPO Status: Time of last liquid consumption: 0800 (Water with meds)                        Time of last solid consumption: 1800                        Date of last liquid consumption: 08/19/22                        Date of last solid food consumption: 08/17/22    BMI:   Wt Readings from Last 3 Encounters:   08/19/22 145 lb (65.8 kg)     Body mass index is 25.69 kg/m².     CBC:   Lab Results   Component Value Date/Time    WBC 8.2 08/19/2022 04:36 AM    RBC 2.57 08/19/2022 04:36 AM    HGB 7.8 08/19/2022 04:36 AM    HCT 23.6 08/19/2022 04:36 AM    MCV 91.8 08/19/2022 04:36 AM    RDW 13.8 08/19/2022 04:36 AM  08/19/2022 04:36 AM       CMP:   Lab Results   Component Value Date/Time     08/19/2022 04:36 AM    K 3.7 08/19/2022 04:36 AM     08/19/2022 04:36 AM    CO2 25 08/19/2022 04:36 AM    BUN 28 08/19/2022 04:36 AM    CREATININE 1.00 08/19/2022 04:36 AM    GFRAA >60 08/19/2022 04:36 AM    LABGLOM 60 08/19/2022 04:36 AM    GLUCOSE 179 08/19/2022 04:36 AM    PROT 6.4 08/09/2022 04:26 AM    CALCIUM 8.1 08/19/2022 04:36 AM    BILITOT 0.3 08/09/2022 04:26 AM    ALKPHOS 75 08/09/2022 04:26 AM    AST 10 08/09/2022 04:26 AM    ALT 12 08/09/2022 04:26 AM       POC Tests:   Recent Labs     08/19/22  0733   POCGLU 173*       Coags: No results found for: PROTIME, INR, APTT    HCG (If Applicable): No results found for: PREGTESTUR, PREGSERUM, HCG, HCGQUANT     ABGs: No results found for: PHART, PO2ART, XDW2EWA, MLO5GMS, BEART, F2BDLODU     Type & Screen (If Applicable):  No results found for: LABABO, LABRH    Drug/Infectious Status (If Applicable):  No results found for: HIV, HEPCAB    COVID-19 Screening (If Applicable): No results found for: COVID19        Anesthesia Evaluation  Patient summary reviewed  Airway: Mallampati: II  TM distance: >3 FB   Neck ROM: limited  Mouth opening: > = 3 FB   Dental: normal exam         Pulmonary: breath sounds clear to auscultation                             Cardiovascular:    (+) hypertension:,                   Neuro/Psych:   (+) depression/anxiety             GI/Hepatic/Renal:   (+) renal disease: CRI,          ROS comment: Ulcerative colitis  Anemia. Endo/Other:    (+) Diabetes (Steroid-induced), . Abdominal:             Vascular: Other Findings:           Anesthesia Plan      TIVA     ASA 3     (Hgb 7.8)        Anesthetic plan and risks discussed with patient.                         Simon Cali MD   8/19/2022

## 2022-08-19 NOTE — PROCEDURES
COLONOSCOPY    DATE of PROCEDURE:   8/19/2022    PRE-OP DIAGNOSIS:  History of Crohn's colitis, has failed multiple modalities and symptoms refractory to IV steroids     POST-OP DIAGNOSIS:  Severe Crohn's colitis extending from hepatic flexure through rectum with perianal disease  Right side and terminal ileum do not appear to be involved    MEDICATION:    MAC per anesthesia     INSTRUMENT:   [id number]    PROCEDURE:   After obtaining informed consent, the patient was placed in the left lateral position and sedated. The endoscope was advanced to the terminal ileum. The appendiceal orifice and ileocecal valve were identified. On withdrawal, the colon was carefully visualized in a 360 degree fashion. Retroflexion was performed in the rectum. The prep was fair but adequate to detect polyps >/= 5 mm. The patient was taken to the recovery area in stable condition. FINDINGS:  Significant perianal disease with apparent old fistulous tracks as well as anal skin tags and external hemorrhoids were noted on digital rectal exam. Severe Crohn's colitis characterized by diffuse erythema, edema, deep ulcerations, obliteration of the vascular pattern, and cobblestoning was noted beginning in the hepatic flexure and extending to and involving the rectum. The terminal ileum, cecum, and ascending colon did not appear to be involved. Biopsies were obtained from the terminal ileum, right colon, transverse colon, left colon.      ESTIMATED BLOOD LOSS:   Minimal.     PLAN:  - She has severe disease that has been refractory to multiple modalities of treatment along with IV steroids  - Change back to IV steroids  - Okay for clear liquid diet today  - Surgical consult for surgical resection  - Will follow      Jesus Harmon MD  [facility], [place]

## 2022-08-19 NOTE — PROGRESS NOTES
Comprehensive Nutrition Assessment    Type and Reason for Visit: Reassess  Malnutrition Screening Tool: Malnutrition Screen  Have you recently lost weight without trying?: 14 to 23 pounds (2 points)  Have you been eating poorly because of a decreased appetite?: Yes (1 point)  Malnutrition Screening Tool Score: 3  TPN consult (Hospitalist)  Nutrition Recommendations/Plan:   Meals and Snacks:  Diet: Continue current order; diet advancement per GI  Continue to encouraged pt to have family/spouse provide preferred meals/food to increase kcal and protein intake during the day  Nutrition Supplement Therapy:  Medical food supplement therapy:  Continue Ensure High Protein to Ensure Clear three times per day (this provides 240 kcal and 8 grams protein per bottle)    Parenteral Nutrition:  Central parenteral nutrition  new bag to begin at 1800 today  Continue: Dex 5%, 4.25% AA 2 L (85ml/hr)   Continue 250 ml  20% lipids daily  To provide: 1180 kcal/d (78% of needs), 85 grams of protein/d (108% of needs), 100 grams of CHO/d and 2250 ml of total volume/d. Lytes/L:   Sodium ( 85 meq NaCl), Potassium (KCl not available for inclusion in TPN at this time secondary to national shortages) Phosphorus ( 14 mmol KPO4), Calcium (4.5 meq), Magnesium 8 meq   Other additives:   MVI Monday Wednesday Friday due to [employer], MTE  Nutrition Related Medication Management: Thiamine  mg x 7 days   Labs:   BMP daily  Mg daily x 3 days then MWF  Phos daily x 3 days then MWF    POC Glucoses/SSI Active     Malnutrition Assessment:  Malnutrition Status:  Moderate malnutrition  Context: Chronic Illness  Findings of clinical characteristics of malnutrition:   Energy Intake:  Mild decrease in energy intake (Comment) (bites at meals x3 weeks)  Weight Loss:  Mild weight loss (specify amount and time period) (20% wt loss since 6/2021)     Body Fat Loss:  Mild body fat loss Triceps, Fat Overlying Ribs   Muscle Mass Loss:  Mild muscle mass loss Clavicles (pectoralis & deltoids), Thigh (quadraceps), Calf (gastrocnemius), Scapula (trapezius), Hand (interosseous)  Fluid Accumulation:  No significant fluid accumulation     Strength:  Not Performed  Nutrition Assessment:  Nutrition History: Pt states she has been having poor po intake x3 weeks PTA consuming bites at meals. She reports taste changes, loss of appetite, and N/V/D. Pt reports ongoing wt loss of the past 2 years weighing 200lbs prior to wt loss. Per EMR, pt weighed 167lbs 6/8/21. Do You Have Any Cultural, Adventism, or Ethnic Food Preferences?: No   Nutrition Background:   Pt with PMH notable for Crohn's disease of large and small bowel, GERD, HTN, OA, and CKD III. Pt presents d/t diarrhea, nausea, abdominal cramping and decreased po intake. Pt admitted with JULIETH and Crohn's flare. GI recommending TPN. Nutrition Interval:  PORT in place - accessed 8/14    Pt gone for colonoscopy at time of visit. Results from colonoscopy states \"severe disease that has been refractory to multiple modalities of treatment along with IV steroids, change back to IV steroids, okay for clear liquid diet, surgery consult for surgical resection\". Informed Dr. Jason Gold continuing with TPN this evening.     Abdominal Status (last documented):   Last BM (including prior to admit): 08/15/22, GI Symptoms: Flatus no stool output documented in past 24 hrs  Pertinent Medications: wellbutrin, lomotil (4 x daily; held), ferrous sulfate (held), SSI (3 units yesterday, none required thus far today), melatonin, solu-medrol Q8H, nystatin, protonix, pravachol, thiamine (to complete 8/21)  Continuous: none  PRN: dilaudid po (last admin 8/18), zofran (last admin 8/17)  Electrolyte Replacement: 8/14: magnesium sulfate 2gm x2, sodium phos 15mmol   Pertinent Labs:   Lab Results   Component Value Date/Time     08/19/2022 04:36 AM    K 3.7 08/19/2022 04:36 AM     08/19/2022 04:36 AM    CO2 25 08/19/2022 04:36 AM    BUN 28 18.5-24. 9)  Estimated Daily Nutrient Needs:  Energy (kcal/day): 2216-4486 (Kcal/kg (25-30) Weight used:   Current (60.7kg)  Protein (g/day): 61-79 (1-1.3 g/kg) Weight Used: (Current) 61 kg (8/10)  Fluid (ml/day):   (1 ml/kcal)    Nutrition Diagnosis:   Inadequate oral intake related to altered GI function, altered taste perception (loss of appetite, food preferences) as evidenced by  (pt reported barriers, po <25% needs, reports significant gi losses)  Moderate malnutrition, In context of chronic illness related to inadequate protein-energy intake as evidenced by Criteria as identified in malnutrition assessment  Nutrition Interventions:   Food and/or Nutrient Delivery: Continue Current Diet, Continue Oral Nutrition Supplement, Modify Parenteral Nutrition     Coordination of Nutrition Care: Continue to monitor while inpatient  Plan of Care discussed with: Dr. Mckenzie Haynes    Goals:   Previous Goal Met: Progressing toward Goal(s)  Active Goal:  (Maintain nutrition needs via TPN until able to meet >50% estimated nutrition needs via po.)       Nutrition Monitoring and Evaluation:      Food/Nutrient Intake Outcomes: Food and Nutrient Intake, Supplement Intake, Parenteral Nutrition Intake/Tolerance  Physical Signs/Symptoms Outcomes: Biochemical Data, GI Status, Fluid Status or Edema, Meal Time Behavior, Weight    Discharge Planning:     Too soon to determine    Audra Gonzalez MS, RDN, LD  Contact: [phone]

## 2022-08-19 NOTE — PROGRESS NOTES
TRANSFER - OUT REPORT:    Verbal report given to [address] [place] [address] Road on Satnam Aiken  being transferred to GI lab for ordered procedure       Report consisted of patient's Situation, Background, Assessment and   Recommendations(SBAR). Information from the following report(s) Nurse Handoff Report was reviewed with the receiving nurse. Lines:   Single Lumen Implantable Port 08/14/22 Left Subclavian (Active)   Port A Cath Status Accessed 08/19/22 0745   Criteria for Appropriate Use Total parenteral nutrition 08/19/22 0745   Site Assessment Clean, dry & intact 08/19/22 0745   Line Care Other (Comment) 08/19/22 0745   Alcohol Cap Used Yes 08/19/22 0745   Date of Last Dressing Change 08/14/22 08/19/22 0745   Dressing Type Transparent; Antimicrobial 08/19/22 0745   Dressing Status Clean, dry & intact 08/19/22 0745   Single Lumen Status Infusing 08/19/22 0745        Opportunity for questions and clarification was provided.       Patient transported with:  [employer]

## 2022-08-19 NOTE — PROGRESS NOTES
TRANSFER - IN REPORT:    [facility] [address]  being received from [phone] for ordered procedure      Report consisted of patient's Situation, Background, Assessment and   Recommendations(SBAR). Information from the following report(s) Nurse Handoff Report was reviewed with the receiving nurse. Opportunity for questions and clarification was provided. Assessment completed upon patient's arrival to unit and care assumed.

## 2022-08-19 NOTE — PROGRESS NOTES
Pt returns to room from GI lab. Pt alert oriented times 3 at this time . Pt complaints of abd pain 9/10 and given Dilaudid 1 mg 1/2 tab po for pain no distress noted at his time. Pt aware of clear liquid diet order. Pt encouraged to call for assistance if needed felicita light in reach, will monitor.

## 2022-08-19 NOTE — CONSULTS
Consult Note  Adonis Dennison  MRN: [id number]  :1960  Age:62 y.o.    HPI: Adonis Dennison is a 65 y.o. female General Surgery was asked to see in consultation by Dr. Tr Holley (GI) to evaluate for possible surgical resection of colon in the setting of severe refractory Crohn's colitis extending from hepatic flexure through rectum with perianal disease. Pt has failed multiple modalities for Crohn's treatment and symptoms refractory to IV steroids. The patient has a PMHx of Crohn's disease (large and small bowel), CKD3, depression, DJD, GERD, HTN, HLP, obesity, OA, and steroid induced DM. Pt initially presented to the ED 22 with a reported 3-4 week hx of generalized abdominal cramping/pain with constant diarrhea (improved with Lomotil at home), nausea and 40 lb weight loss in 6 months. Pt also c/o severe rectal spasms but denied bleeding. She reports having watery foul smelling stool every 30-60min, increased weakness, and dehydration. Pt's IBD has been essentially uncontrolled with Remicade, Humira, Entyvio, stelara and she is currently on [place]. Pt reports she ran out of [place] for 4-5 days prior to ED presentation. She reports she has responded intermittently to budesonide and prednisone. Bentyl has been ineffective but she is unsure about Levsin. Pt was seen by Dr. Thiago Koo (GI) in late July with similar complaints. Labs showed normal CBC, LFTs, and TSH. She had elevated Cr at 1.7, elevated CRP and calprotectin 1296 with neg C diff 22. CT A/P with findings cw IBD. On admission, has JULIETH with Cr 3.8, K 2.8, Na 127, HGB 10.. She was admitted on 2022 for JULIETH.      Colonoscopy 22 12:15  FINDINGS:  Significant perianal disease with apparent old fistulous tracks as well as anal skin tags and external hemorrhoids were noted on digital rectal exam. Severe Crohn's colitis characterized by diffuse erythema, edema, deep ulcerations, obliteration of the vascular pattern, and cobblestoning was noted beginning in the hepatic flexure and extending to and involving the rectum. The terminal ileum, cecum, and ascending colon did not appear to be involved. Biopsies were obtained from the terminal ileum, right colon, transverse colon, left colon. POST-OP DIAGNOSIS:  Severe Crohn's colitis extending from hepatic flexure through rectum with perianal disease  Right side and terminal ileum do not appear to be involved  Other related Crohn's workup:  EGD 3/23/20 : normal  Colonoscopy 3/23/20: IH, colon polyp, crohn's ileitis and colitis. Cdiff negative 8/2/22. Fecal calprotectin 1296. . TSH normal. LFTs normal.  CT A/P 8/5/22: diffusely thickened transverse, descending, and sigmoid colon  cw with IBD, slightly prominent adrenal glands, small renal and hepatic cysts. Previous abdominal surgeries: cholecystectomy 2009  Pt is not taking anticoagulation  Pt with clear liquid diet at time of consult. Currently receiving TPN       Past Medical History:   Diagnosis Date    Depression     managed with medication     Disc prolapse     L4 to L5    DJD (degenerative joint disease)     GERD (gastroesophageal reflux disease)     managed with medication     Hypertension     managed with medication     Obesity (BMI 30-39. 9)     BMI 31.5    Osteoarthritis     Steroid-induced diabetes mellitus ([address] 6/5/2013    Steroid-induced diabetes mellitus ([address] 6/5/2013    no present medication needed     Ulcerative colitis ([address]     managed with medication      Past Surgical History:   Procedure Laterality Date    COLONOSCOPY  2009    showed UC    FLEXIBLE SIGMOIDOSCOPY N/A 5/11/2016    SIGMOIDOSCOPY FLEXIBLE/   BMI 36 performed by Laurie Cantu MD at [facility] 10    left mastoidectomy    HEENT  1971    tympanoplasty    INNER EAR SURGERY PROC UNLISTED  37,72,41    mastoid cyst +TM repair-hearing loss, left ear    TONSILLECTOMY  1971         VASCULAR SURGERY  06/5/2013 [place]    port placement    VASCULAR SURGERY unremarkable except as noted above. Physical Exam:   /78   Pulse 86   Temp 97.9 °F (36.6 °C)   Resp 16   Ht 5' 3\" (1.6 m)   Wt 145 lb (65.8 kg)   SpO2 100%   BMI 25.69 kg/m²   Constitutional: Alert, oriented, cooperative patient in no acute distress; appears stated age    Eyes: Sclera are clear. EOMs intact  ENMT: no external lesions gross hearing normal; no obvious neck masses, no ear or lip lesions, nares normal  CV: RRR. Normal perfusion  Resp: No JVD. Breathing is  non-labored; no audible wheezing. GI: soft and non-distended, non-tender  Musculoskeletal: unremarkable with normal function. No embolic signs or cyanosis.    Neuro:  Oriented; moves all 4; no focal deficits  Psychiatric: normal affect and mood, no memory impairment    Recent vitals (if inpt):  Patient Vitals for the past 24 hrs:   BP Temp Temp src Pulse Resp SpO2 Height Weight   08/19/22 1320 133/78 97.9 °F (36.6 °C) -- 86 16 100 % -- --   08/19/22 1302 -- -- -- 81 -- -- -- --   08/19/22 1259 137/73 -- -- 87 14 100 % -- --   08/19/22 1252 -- -- -- 82 -- -- -- --   08/19/22 1249 139/69 -- -- 84 14 100 % -- --   08/19/22 1239 133/69 -- -- 85 13 99 % -- --   08/19/22 1229 136/69 -- -- 83 -- 100 % -- --   08/19/22 1220 120/77 96.8 °F (36 °C) Skin 79 16 100 % -- --   08/19/22 1036 138/74 97.8 °F (36.6 °C) -- 80 16 97 % 5' 3\" (1.6 m) 145 lb (65.8 kg)   08/19/22 0730 127/72 98.2 °F (36.8 °C) Oral 80 18 100 % -- --   08/19/22 0600 -- -- -- -- -- -- -- 145 lb 4.5 oz (65.9 kg)   08/19/22 0252 131/77 98 °F (36.7 °C) Oral 80 20 100 % -- --   08/18/22 2154 -- -- -- -- 20 -- -- --   08/18/22 2124 -- -- -- -- 20 -- -- --   08/18/22 2024 (!) 145/80 98.1 °F (36.7 °C) Oral 85 18 100 % -- --   08/18/22 1548 127/75 98.6 °F (37 °C) Oral 81 19 98 % -- --       Labs:  Recent Labs     08/19/22  0436   WBC 8.2   HGB 7.8*      *   K 3.7      CO2 25   BUN 28*       Lab Results   Component Value Date/Time    WBC 8.2 08/19/2022 04:36 AM HGB 7.8 08/19/2022 04:36 AM     08/19/2022 04:36 AM     08/19/2022 04:36 AM    K 3.7 08/19/2022 04:36 AM     08/19/2022 04:36 AM    CO2 25 08/19/2022 04:36 AM    BUN 28 08/19/2022 04:36 AM    ALT 12 08/09/2022 04:26 AM     CT OF THE ABDOMEN AND PELVIS WITH CONTRAST. 8/13/22 16:57       CLINICAL INDICATION: Abdominal pain       PROCEDURE: Serial thin section axial images obtained from the lung bases through   the proximal femurs following the administration of 100 cc of Isovue 370   intravenous contrast.  Radiation dose reduction techniques were used for this   study. Our CT scanners use one or all of the following: Automated exposure   control, adjusted of the mA and/or kV according to patient size, iterative   reconstruction       COMPARISON: CT abdomen pelvis without contrast dated 8/10/2022       FINDINGS:        Trace bilateral pleural effusions are present. CT ABDOMEN: The gallbladder is hydropic but maintains thin walls. No gallstones   noted. The stomach is incompletely distended. The spleen is normal. The kidneys   are unremarkable except for a tiny simple right renal cyst. There is no   hydronephrosis. The adrenal glands are normal. The appendix is normal. There is   mild long segment wall thickening along the distal transverse, descending and   sigmoid colon. This may be related to incomplete bowel distention. Colitis is   not excluded. CT PELVIS: The bladder is well-distended with smooth thin walls. The rectum is   normal. The uterus and adnexa are unremarkable. No free fluid accumulating in   the pelvis. There is no inguinal hernia or adenopathy. No aggressive osseous is identified. Impression   1. Circumferential wall thickening over a long segment of colon involving the   distal transverse, descending and sigmoid colon. The apparent thickening may be   accentuated by incomplete luminal distention.  Colitis could be considered in the   setting of abdominal pain.   2. Hydropic gallbladder, no gallstones or obvious gallbladder wall thickening. I reviewed recent labs and recent radiologic studies. ASSESSMENT/PLAN:    Principal Problem:    JULIETH (acute kidney injury) ([address]  Active Problems:    Crohn's disease ([address]    Hypertension    Hyperlipidemia    Depression    Moderate protein-calorie malnutrition (HCC)    Iron deficiency anemia due to chronic blood loss    RLS (restless legs syndrome)    CKD (chronic kidney disease) stage 3, GFR 30-59 ml/min (HCC)    Colitis    Anemia    Essential hypertension, benign  Resolved Problems:    Hyponatremia    Hyperkalemia    Acute hypokalemia       Care Management per Hospitalist  GI Following  --Colonoscopy 8/19/22  --TPN  --IV Solumedrol  Pt with history of Crohn's colitis which has failed multiple modalities and symptoms refractory to IV steroids. Colonoscopy by GI 8/19/22 revealed severe Crohn's colitis from hepatic flexure through rectum with perianal involvement. Due to the biologic refractory Crohn's of both small bowel and colon without rectal sparing with perianal involvement, she wound be better served by a Colorectal Specialist for this complexity. Patient most likely will require a permanent stoma and the best outcome would be obtained by a Colorectal Surgeon especially given her ongoing relationship with Dr. Darin Pizarro (now at [facility] and seen several times this past year as well as having placed her infusion port in 2013). Recommend pt follow up with Dr. Darin Pizarro or Colon and Rectal group at [facility] if unavailable. I have personally performed a face-to-face diagnostic evaluation and management  service on this patient. I have independently seen the patient. I have independently obtained the above history from the patient/family. I have independently examined the patient with above findings. I have independently reviewed data/labs for this patient and developed the above plan of care (MDM).   Signed: Kelly Lorenzo Whit Gonzalez MD, FACS

## 2022-08-19 NOTE — PROGRESS NOTES
Pt resting in bed. Pt awakens when spoken to. Pt reports not tolerating prep well last night. No s/sx of distress noted this morning. Approximately 800 cc of liquid brown stool emptied from [facility]. No formed stool noted. Pt on RA. Pt NPO for procedure. Pt encouraged to call for assistance if needed call light in reach, will monitor.

## 2022-08-19 NOTE — PROGRESS NOTES
Pt in bed resting on RA, PPN  infusing at 85mL/hr, Bowel prep complete at 0400, SBAR given to PRICILA Box.

## 2022-08-19 NOTE — PROGRESS NOTES
Hospitalist Progress Note   Admit Date:  2022  3:17 PM   Name:  Federico Walker   Age:  65 y.o. Sex:  female  :  1960   MRN:  [id number]   Room:  2/    Presenting Complaint: Nausea     Reason(s) for Admission: Dehydration [E86.0]  Hypokalemia [E87.6]  JULIETH (acute kidney injury) ([facility] [address] [N17.9]  Diarrhea, unspecified type [R19.7]  Acute renal failure superimposed on chronic kidney disease, unspecified CKD stage, unspecified acute renal failure type ([address] [N17.9, N18.9]     Hospital Course & Interval History:     Please refer to the admission H&P for details of presentation. In summary, Federico Walker is a 65 y.o. female with medical history significant for crohns disease, HTN, CKD3, depression, HLP who was admitted for several weeks of nausea, emesis and abdominal pain. GI was consulted due to concerns for crohns flare up. Patient was started on IV steroids. Patient symptoms improved initially but worsened after transitioning to PO steroids. CT A/P on  was done due to continued abdominal pain. It showed circumferential wall thickening of colon possibly suggestive of colitis, therefore she was started on cipro and flagyl. Patient also had multiple bouts of diarrhea but was unable to be verified by staff. Stool studies including C. difficile has been negative. Patient continues to have abdominal pain frequent episodes of diarrhea/BM although stool amount has decreased. IV steroids has been switched to p.o. steroids improvement in her symptoms. Underwent colonoscopy this afternoon does severe Crohn colitis characterized by diffuse erythema, edema, deep ulceration obliteration of vascular pattern and cobblestoning beginning the hepatic flexure and extending into and involving the rectum. Subjective/24 hr Events (22) : Patient is seen and examined at bedside. No acute events reported overnight by nursing staff. Seen prior to colonoscopy.   Reports that she has completed bowel prep overnight with minimal abdominal pain. Did notice episodes of bloating during the bowel prep. Has not gotten any p.o. pain medication since midnight. Patient denies fever, chills, chest pains, shortness of breath, n/v.    Review of Systems: 10 point review of systems is otherwise negative with the exception of the elements mentioned above.'        Assessment & Plan:     Crohn's disease with flare  Colitis  Possible crohns flare up. Stool studies have been neg including c.diff. CT A/P on 8/13 with findings suggestive of colitis  08/19/22: EGD today  Underwent colonoscopy this afternoon does severe Crohn colitis characterized by diffuse erythema, edema, deep ulceration obliteration of vascular pattern and cobblestoning beginning the hepatic flexure and extending into and involving the rectum. -GI recommendations appreciated  - back to IV steroid. - continue on CLDs  - surgical consultation for resection given severe disease  - Continue Lomotil  - continue TPN as per nutrition    JULIETH on CKD3  Creatinine peaked at 3.80 on 8/8 08/19/22: Cr stable at 1.00.  - encouraged PO intake. - monitor renal function    HTN  // HLD  08/19/22:  BP continues to be stable while off BP meds. Continue to hold and restart as needed  - continue with home statin    Depression: continue with home wellbutrin and zoloft  RLS: continue with requip    Moderate Protein Calorie Malnutrition  - nutrition on board    Anemia, iron deficiency  - now on PO iron    Discharge Planning:  pending improvement of GI symptoms. Diet:  PN-Adult Premix 4.25/5 - Peripheral Line  ADULT DIET; Clear Liquid;  No red dye  ADULT ORAL NUTRITION SUPPLEMENT; Breakfast, Dinner; Clear Liquid Oral Supplement  PN-Adult Premix 4.25/5 - Peripheral Line  DVT PPx: SCDs  Code status: Full Code      Hospital Problems:  Principal Problem:    JULIETH (acute kidney injury) ([facility] [address]  Active Problems:    Crohn's disease ([address]    Hypertension    Hyperlipidemia Depression    Moderate protein-calorie malnutrition (HCC)    Iron deficiency anemia due to chronic blood loss    RLS (restless legs syndrome)    CKD (chronic kidney disease) stage 3, GFR 30-59 ml/min (HCC)    Colitis    Anemia    Essential hypertension, benign  Resolved Problems:    Hyponatremia    Hyperkalemia    Acute hypokalemia      Objective:   Patient Vitals for the past 24 hrs:   Temp Pulse Resp BP SpO2   08/19/22 1320 97.9 °F (36.6 °C) 86 16 133/78 100 %   08/19/22 1302 -- 81 -- -- --   08/19/22 1259 -- 87 14 137/73 100 %   08/19/22 1252 -- 82 -- -- --   08/19/22 1249 -- 84 14 139/69 100 %   08/19/22 1239 -- 85 13 133/69 99 %   08/19/22 1229 -- 83 -- 136/69 100 %   08/19/22 1220 96.8 °F (36 °C) 79 16 120/77 100 %   08/19/22 1036 97.8 °F (36.6 °C) 80 16 138/74 97 %   08/19/22 0730 98.2 °F (36.8 °C) 80 18 127/72 100 %   08/19/22 0252 98 °F (36.7 °C) 80 20 131/77 100 %   08/18/22 2154 -- -- 20 -- --   08/18/22 2124 -- -- 20 -- --   08/18/22 2024 98.1 °F (36.7 °C) 85 18 (!) 145/80 100 %   08/18/22 1548 98.6 °F (37 °C) 81 19 127/75 98 %       Oxygen Therapy  SpO2: 100 %  Pulse via Oximetry: 87 beats per minute  Pulse Oximeter Device Mode: Continuous  Pulse Oximeter Device Location: Right, Finger  O2 Device: Nasal cannula  Skin Assessment: Clean, dry, & intact  Oxygen Therapy: None (Room air)    Estimated body mass index is 25.69 kg/m² as calculated from the following:    Height as of this encounter: 5' 3\" (1.6 m). Weight as of this encounter: 145 lb (65.8 kg). Intake/Output Summary (Last 24 hours) at 8/19/2022 1401  Last data filed at 8/19/2022 1238  Gross per 24 hour   Intake 740 ml   Output 1250 ml   Net -510 ml         Physical Exam:     Blood pressure 133/78, pulse 86, temperature 97.9 °F (36.6 °C), resp. rate 16, height 5' 3\" (1.6 m), weight 145 lb (65.8 kg), SpO2 100 %. General:    Alert and awake.  Not in apparent distress  Head:  Normocephalic, atraumatic  Eyes:  Sclerae appear normal.  Pupils Performed by: Joseph    POCT Glucose    Collection Time: 08/18/22  9:03 PM   Result Value Ref Range    POC Glucose 259 (H) 65 - 100 mg/dL    Performed by: Darling    CBC with Auto Differential    Collection Time: 08/19/22  4:36 AM   Result Value Ref Range    WBC 8.2 4.3 - 11.1 K/uL    RBC 2.57 (L) 4.05 - 5.2 M/uL    Hemoglobin 7.8 (L) 11.7 - 15.4 g/dL    Hematocrit 23.6 (L) 35.8 - 46.3 %    MCV 91.8 79.6 - 97.8 FL    MCH 30.4 26.1 - 32.9 PG    MCHC 33.1 31.4 - 35.0 g/dL    RDW 13.8 11.9 - 14.6 %    Platelets 432 629 - 397 K/uL    MPV 8.1 (L) 9.4 - 12.3 FL    nRBC 0.00 0.0 - 0.2 K/uL    Differential Type AUTOMATED      Seg Neutrophils 80 (H) 43 - 78 %    Lymphocytes 9 (L) 13 - 44 %    Monocytes 8 4.0 - 12.0 %    Eosinophils % 0 (L) 0.5 - 7.8 %    Basophils 0 0.0 - 2.0 %    Immature Granulocytes 3 0.0 - 5.0 %    Segs Absolute 6.5 1.7 - 8.2 K/UL    Absolute Lymph # 0.8 0.5 - 4.6 K/UL    Absolute Mono # 0.7 0.1 - 1.3 K/UL    Absolute Eos # 0.0 0.0 - 0.8 K/UL    Basophils Absolute 0.0 0.0 - 0.2 K/UL    Absolute Immature Granulocyte 0.2 0.0 - 0.5 K/UL   Basic Metabolic Panel w/ Reflex to MG    Collection Time: 08/19/22  4:36 AM   Result Value Ref Range    Sodium 135 (L) 136 - 145 mmol/L    Potassium 3.7 3.5 - 5.1 mmol/L    Chloride 104 98 - 107 mmol/L    CO2 25 21 - 32 mmol/L    Anion Gap 6 (L) 7 - 16 mmol/L    Glucose 179 (H) 65 - 100 mg/dL    BUN 28 (H) 8 - 23 MG/DL    Creatinine 1.00 0.6 - 1.0 MG/DL    GFR African American >60 >60 ml/min/1.73m2    GFR Non- 60 (L) >60 ml/min/1.73m2    Calcium 8.1 (L) 8.3 - 10.4 MG/DL   Magnesium    Collection Time: 08/19/22  4:36 AM   Result Value Ref Range    Magnesium 2.2 1.8 - 2.4 mg/dL   Phosphorus    Collection Time: 08/19/22  4:36 AM   Result Value Ref Range    Phosphorus 2.0 (L) 2.3 - 3.7 MG/DL   POCT Glucose    Collection Time: 08/19/22  7:33 AM   Result Value Ref Range    POC Glucose 173 (H) 65 - 100 mg/dL    Performed by: Merced Pavon I have personally reviewed imaging studies showing: Other Studies:  CT ABDOMEN PELVIS W IV CONTRAST Additional Contrast? Oral   Final Result   1. Circumferential wall thickening over a long segment of colon involving the   distal transverse, descending and sigmoid colon. The apparent thickening may be   accentuated by incomplete luminal distention. Colitis could be considered in the   setting of abdominal pain. 2. Hydropic gallbladder, no gallstones or obvious gallbladder wall thickening. CT ABDOMEN PELVIS RENAL STONE Additional Contrast? None   Final Result   1. No acute abdominal or pelvic abnormality noted on this limited examination   without the benefit of intravenous or oral contrast. Specifically, no renal or   ureteral stones present. There is no hydronephrosis or hydroureter. US RETROPERITONEAL COMPLETE   Final Result      Mild right hydronephrosis, of uncertain etiology. Otherwise, unremarkable   ultrasound of the kidneys.                 Current Meds:  Current Facility-Administered Medications   Medication Dose Route Frequency    methylPREDNISolone sodium (SOLU-MEDROL) injection 20 mg  20 mg IntraVENous Q8H    PN-Adult Premix 4.25/5 - Peripheral Line   IntraVENous Continuous TPN    PN-Adult Premix 4.25/5 - Peripheral Line   IntraVENous Continuous TPN    fat emulsion 20 % infusion 250 mL  250 mL IntraVENous Daily    HYDROmorphone (DILAUDID) tablet 1 mg  1 mg Oral Q4H PRN    thiamine (B-1) injection 100 mg  100 mg IntraVENous Daily    potassium chloride 20 mEq/50 mL IVPB (Central Line)  20 mEq IntraVENous PRN    Or    potassium chloride 10 mEq/100 mL IVPB (Peripheral Line)  10 mEq IntraVENous PRN    magnesium sulfate 2000 mg in 50 mL IVPB premix  2,000 mg IntraVENous PRN    sodium phosphate 10 mmol in sodium chloride 0.9 % 250 mL IVPB  10 mmol IntraVENous PRN    Or    sodium phosphate 15 mmol in sodium chloride 0.9 % 250 mL IVPB  15 mmol IntraVENous PRN    Or    sodium phosphate 20 mmol in sodium chloride 0.9 % 500 mL IVPB  20 mmol IntraVENous PRN    diatrizoate meglumine-sodium (GASTROGRAFIN) 66-10 % solution 15 mL  15 mL Oral ONCE PRN    [Held by provider] ferrous sulfate (IRON 325) tablet 325 mg  325 mg Oral BID WC    melatonin tablet 9 mg  9 mg Oral Nightly    traMADol (ULTRAM) tablet 50 mg  50 mg Oral Q6H PRN    Or    traMADol (ULTRAM) tablet 100 mg  100 mg Oral Q6H PRN    insulin lispro (HUMALOG) injection vial 0-4 Units  0-4 Units SubCUTAneous TID WC    insulin lispro (HUMALOG) injection vial 0-4 Units  0-4 Units SubCUTAneous Nightly    glucose chewable tablet 16 g  4 tablet Oral PRN    dextrose bolus 10% 125 mL  125 mL IntraVENous PRN    Or    dextrose bolus 10% 250 mL  250 mL IntraVENous PRN    glucagon (rDNA) injection 1 mg  1 mg SubCUTAneous PRN    dextrose 10 % infusion   IntraVENous Continuous PRN    hyoscyamine (LEVSIN/SL) sublingual tablet 125 mcg  125 mcg SubLINGual TID    [Held by provider] diphenoxylate-atropine (LOMOTIL) 2.5-0.025 MG per tablet 1 tablet  1 tablet Oral 4x Daily    buPROPion (WELLBUTRIN SR) extended release tablet 150 mg  150 mg Oral QAM    pantoprazole (PROTONIX) tablet 40 mg  40 mg Oral QAM AC    pravastatin (PRAVACHOL) tablet 80 mg  80 mg Oral Nightly    sertraline (ZOLOFT) tablet 100 mg  100 mg Oral Daily    sodium chloride flush 0.9 % injection 5-40 mL  5-40 mL IntraVENous 2 times per day    sodium chloride flush 0.9 % injection 5-40 mL  5-40 mL IntraVENous PRN    0.9 % sodium chloride infusion   IntraVENous PRN    ondansetron (ZOFRAN-ODT) disintegrating tablet 4 mg  4 mg Oral Q8H PRN    Or    ondansetron (ZOFRAN) injection 4 mg  4 mg IntraVENous Q6H PRN    polyethylene glycol (GLYCOLAX) packet 17 g  17 g Oral Daily PRN    acetaminophen (TYLENOL) tablet 650 mg  650 mg Oral Q6H PRN    Or    acetaminophen (TYLENOL) suppository 650 mg  650 mg Rectal Q6H PRN    nystatin (MYCOSTATIN) 780631 UNIT/ML suspension 500,000 Units  5 mL Oral 4x Daily    morphine

## 2022-08-19 NOTE — CARE COORDINATION
VALERIO CM:  patient to have colonoscopy today with possible discharge tomorrow. Patient has not discharge needs at this time. Case Management will continue to follow.

## 2022-08-19 NOTE — PROGRESS NOTES
TRANSFER - OUT REPORT:    Verbal report given to tia rn on Lauren Bowen  being transferred to [address] [place] [address] for routine post-op       Report consisted of patient's Situation, Background, Assessment and   Recommendations(SBAR). Information from the following report(s) Nurse Handoff Report was reviewed with the receiving nurse. Lines:   Single Lumen Implantable Port 08/14/22 Left Subclavian (Active)   Port A Cath Status Accessed 08/19/22 0745   Criteria for Appropriate Use Total parenteral nutrition 08/19/22 0745   Site Assessment Clean, dry & intact 08/19/22 0745   Line Care Other (Comment) 08/19/22 0745   Alcohol Cap Used Yes 08/19/22 0745   Date of Last Dressing Change 08/14/22 08/19/22 0745   Dressing Type Transparent; Antimicrobial 08/19/22 0745   Dressing Status Clean, dry & intact 08/19/22 0745   Single Lumen Status Infusing 08/19/22 0745        Opportunity for questions and clarification was provided.       Patient transported with:  [facility]

## 2022-08-20 PROBLEM — R73.9 HYPERGLYCEMIA: Status: ACTIVE | Noted: 2022-08-20

## 2022-08-20 LAB
ALBUMIN SERPL-MCNC: 1.9 G/DL (ref 3.2–4.6)
ANION GAP SERPL CALC-SCNC: 5 MMOL/L (ref 7–16)
BUN SERPL-MCNC: 27 MG/DL (ref 8–23)
CALCIUM SERPL-MCNC: 7.4 MG/DL (ref 8.3–10.4)
CHLORIDE SERPL-SCNC: 106 MMOL/L (ref 98–107)
CO2 SERPL-SCNC: 25 MMOL/L (ref 21–32)
CREAT SERPL-MCNC: 1 MG/DL (ref 0.6–1)
EST. AVERAGE GLUCOSE BLD GHB EST-MCNC: 128 MG/DL
GLUCOSE BLD STRIP.AUTO-MCNC: 198 MG/DL (ref 65–100)
GLUCOSE BLD STRIP.AUTO-MCNC: 238 MG/DL (ref 65–100)
GLUCOSE BLD STRIP.AUTO-MCNC: 261 MG/DL (ref 65–100)
GLUCOSE BLD STRIP.AUTO-MCNC: 272 MG/DL (ref 65–100)
GLUCOSE SERPL-MCNC: 251 MG/DL (ref 65–100)
HBA1C MFR BLD: 6.1 % (ref 4.8–5.6)
MAGNESIUM SERPL-MCNC: 2.1 MG/DL (ref 1.8–2.4)
POTASSIUM SERPL-SCNC: 3.5 MMOL/L (ref 3.5–5.1)
SERVICE CMNT-IMP: ABNORMAL
SODIUM SERPL-SCNC: 136 MMOL/L (ref 136–145)

## 2022-08-20 PROCEDURE — 6370000000 HC RX 637 (ALT 250 FOR IP): Performed by: INTERNAL MEDICINE

## 2022-08-20 PROCEDURE — 6370000000 HC RX 637 (ALT 250 FOR IP): Performed by: HOSPITALIST

## 2022-08-20 PROCEDURE — 2580000003 HC RX 258: Performed by: INTERNAL MEDICINE

## 2022-08-20 PROCEDURE — 83735 ASSAY OF MAGNESIUM: CPT

## 2022-08-20 PROCEDURE — 1100000000 HC RM PRIVATE

## 2022-08-20 PROCEDURE — 6370000000 HC RX 637 (ALT 250 FOR IP): Performed by: PHYSICIAN ASSISTANT

## 2022-08-20 PROCEDURE — 6360000002 HC RX W HCPCS: Performed by: INTERNAL MEDICINE

## 2022-08-20 PROCEDURE — 6360000002 HC RX W HCPCS: Performed by: STUDENT IN AN ORGANIZED HEALTH CARE EDUCATION/TRAINING PROGRAM

## 2022-08-20 PROCEDURE — 80048 BASIC METABOLIC PNL TOTAL CA: CPT

## 2022-08-20 PROCEDURE — 82962 GLUCOSE BLOOD TEST: CPT

## 2022-08-20 PROCEDURE — 82040 ASSAY OF SERUM ALBUMIN: CPT

## 2022-08-20 PROCEDURE — 2500000003 HC RX 250 WO HCPCS: Performed by: INTERNAL MEDICINE

## 2022-08-20 PROCEDURE — 36591 DRAW BLOOD OFF VENOUS DEVICE: CPT

## 2022-08-20 RX ORDER — INSULIN GLARGINE 100 [IU]/ML
4 INJECTION, SOLUTION SUBCUTANEOUS DAILY
Status: DISCONTINUED | OUTPATIENT
Start: 2022-08-20 | End: 2022-08-21

## 2022-08-20 RX ADMIN — CALCIUM GLUCONATE: 98 INJECTION, SOLUTION INTRAVENOUS at 17:42

## 2022-08-20 RX ADMIN — HYOSCYAMINE SULFATE 125 MCG: 0.12 TABLET ORAL at 20:58

## 2022-08-20 RX ADMIN — NYSTATIN 500000 UNITS: 100000 SUSPENSION ORAL at 16:55

## 2022-08-20 RX ADMIN — HYOSCYAMINE SULFATE 125 MCG: 0.12 TABLET ORAL at 14:47

## 2022-08-20 RX ADMIN — METHYLPREDNISOLONE SODIUM SUCCINATE 20 MG: 40 INJECTION, POWDER, FOR SOLUTION INTRAMUSCULAR; INTRAVENOUS at 20:58

## 2022-08-20 RX ADMIN — NYSTATIN 500000 UNITS: 100000 SUSPENSION ORAL at 08:21

## 2022-08-20 RX ADMIN — SODIUM CHLORIDE, PRESERVATIVE FREE 10 ML: 5 INJECTION INTRAVENOUS at 08:36

## 2022-08-20 RX ADMIN — HYOSCYAMINE SULFATE 125 MCG: 0.12 TABLET ORAL at 08:21

## 2022-08-20 RX ADMIN — INSULIN GLARGINE 4 UNITS: 100 INJECTION, SOLUTION SUBCUTANEOUS at 09:29

## 2022-08-20 RX ADMIN — HYDROMORPHONE HYDROCHLORIDE 1 MG: 2 TABLET ORAL at 05:08

## 2022-08-20 RX ADMIN — METHYLPREDNISOLONE SODIUM SUCCINATE 20 MG: 40 INJECTION, POWDER, FOR SOLUTION INTRAMUSCULAR; INTRAVENOUS at 12:00

## 2022-08-20 RX ADMIN — SERTRALINE HYDROCHLORIDE 100 MG: 25 TABLET ORAL at 08:21

## 2022-08-20 RX ADMIN — Medication 9 MG: at 20:58

## 2022-08-20 RX ADMIN — ROPINIROLE HYDROCHLORIDE 0.5 MG: 0.5 TABLET, FILM COATED ORAL at 20:58

## 2022-08-20 RX ADMIN — METHYLPREDNISOLONE SODIUM SUCCINATE 20 MG: 40 INJECTION, POWDER, FOR SOLUTION INTRAMUSCULAR; INTRAVENOUS at 05:08

## 2022-08-20 RX ADMIN — HYDROMORPHONE HYDROCHLORIDE 1 MG: 2 TABLET ORAL at 14:51

## 2022-08-20 RX ADMIN — SODIUM CHLORIDE, PRESERVATIVE FREE 10 ML: 5 INJECTION INTRAVENOUS at 20:59

## 2022-08-20 RX ADMIN — NYSTATIN 500000 UNITS: 100000 SUSPENSION ORAL at 12:00

## 2022-08-20 RX ADMIN — BUPROPION HYDROCHLORIDE 150 MG: 150 TABLET, EXTENDED RELEASE ORAL at 08:22

## 2022-08-20 RX ADMIN — INSULIN LISPRO 2 UNITS: 100 INJECTION, SOLUTION INTRAVENOUS; SUBCUTANEOUS at 16:55

## 2022-08-20 RX ADMIN — INSULIN LISPRO 1 UNITS: 100 INJECTION, SOLUTION INTRAVENOUS; SUBCUTANEOUS at 12:00

## 2022-08-20 RX ADMIN — THIAMINE HYDROCHLORIDE 100 MG: 100 INJECTION, SOLUTION INTRAMUSCULAR; INTRAVENOUS at 08:22

## 2022-08-20 RX ADMIN — NYSTATIN 500000 UNITS: 100000 SUSPENSION ORAL at 20:58

## 2022-08-20 RX ADMIN — SOYBEAN OIL 250 ML: 20 INJECTION, SOLUTION INTRAVENOUS at 17:42

## 2022-08-20 RX ADMIN — HYDROMORPHONE HYDROCHLORIDE 1 MG: 2 TABLET ORAL at 20:57

## 2022-08-20 RX ADMIN — HYDROMORPHONE HYDROCHLORIDE 1 MG: 2 TABLET ORAL at 10:07

## 2022-08-20 RX ADMIN — PRAVASTATIN SODIUM 80 MG: 80 TABLET ORAL at 20:58

## 2022-08-20 RX ADMIN — INSULIN LISPRO 2 UNITS: 100 INJECTION, SOLUTION INTRAVENOUS; SUBCUTANEOUS at 08:21

## 2022-08-20 RX ADMIN — PANTOPRAZOLE SODIUM 40 MG: 40 TABLET, DELAYED RELEASE ORAL at 06:14

## 2022-08-20 ASSESSMENT — PAIN DESCRIPTION - LOCATION
LOCATION: ABDOMEN

## 2022-08-20 ASSESSMENT — PAIN SCALES - GENERAL
PAINLEVEL_OUTOF10: 6
PAINLEVEL_OUTOF10: 0
PAINLEVEL_OUTOF10: 8
PAINLEVEL_OUTOF10: 9

## 2022-08-20 ASSESSMENT — PAIN DESCRIPTION - DESCRIPTORS
DESCRIPTORS: ACHING
DESCRIPTORS: DULL
DESCRIPTORS: ACHING

## 2022-08-20 ASSESSMENT — PAIN DESCRIPTION - ORIENTATION
ORIENTATION: LOWER;UPPER
ORIENTATION: MID;UPPER
ORIENTATION: UPPER;LOWER
ORIENTATION: LOWER;UPPER

## 2022-08-20 ASSESSMENT — PAIN - FUNCTIONAL ASSESSMENT
PAIN_FUNCTIONAL_ASSESSMENT: ACTIVITIES ARE NOT PREVENTED
PAIN_FUNCTIONAL_ASSESSMENT: ACTIVITIES ARE NOT PREVENTED

## 2022-08-20 NOTE — PROGRESS NOTES
Pt resting in bed awake. Alert and oriented times 3 at this time. On RA. No s/sx of distress noted. Reports abd pain but states is tolerable and just recently took pain meds. PPN infusing at 85ml/hr. Encouraged to call for assistance as needed. Call light within reach. Will continue to monitor.

## 2022-08-20 NOTE — PROGRESS NOTES
Hospitalist Progress Note   Admit Date:  2022  3:17 PM   Name:  Federico Walker   Age:  65 y.o. Sex:  female  :  1960   MRN:  [id number]   Room:  2/    Presenting Complaint: Nausea     Reason(s) for Admission: Dehydration [E86.0]  Hypokalemia [E87.6]  JULIETH (acute kidney injury) ([facility] [address] [N17.9]  Diarrhea, unspecified type [R19.7]  Acute renal failure superimposed on chronic kidney disease, unspecified CKD stage, unspecified acute renal failure type ([address] [N17.9, N18.9]     Hospital Course & Interval History:     Please refer to the admission H&P for details of presentation. In summary, Federico Walker is a 65 y.o. female with medical history significant for crohns disease, HTN, CKD3, depression, HLP who was admitted for several weeks of nausea, emesis and abdominal pain. GI was consulted due to concerns for crohns flare up. Patient was started on IV steroids. Patient symptoms improved initially but worsened after transitioning to PO steroids. CT A/P on  was done due to continued abdominal pain. It showed circumferential wall thickening of colon possibly suggestive of colitis, therefore she was started on cipro and flagyl. Patient also had multiple bouts of diarrhea but was unable to be verified by staff. Stool studies including C. difficile has been negative. Patient continues to have abdominal pain frequent episodes of diarrhea/BM although stool amount has decreased. IV steroids has been switched to p.o. steroids improvement in her symptoms. Underwent colonoscopy on  which showed severe Crohn colitis characterized by diffuse erythema, edema, deep ulceration obliteration of vascular pattern and cobblestoning beginning the hepatic flexure and extending into and involving the rectum. Surgery has been consulted for resection. Subjective/24 hr Events (22) : Patient is seen and examined at bedside. No acute events reported overnight by nursing staff.   Patient is alert, awake and sitting in bed. Reporting improvement in her abdominal pain. She tolerated clear liquid diet last night and this morning. Discussed Colonoscopy findings as well as surgical consult with possible colectomy. She expressed understanding and accepting of possible colectomy. Patient denies fever, chills, chest pains, shortness of breath, n/v.    Review of Systems: 10 point review of systems is otherwise negative with the exception of the elements mentioned above.'        Assessment & Plan:     Crohn's disease with flare  Severe crohn's disease. Stool studies have been neg including c.diff. CT A/P on 8/13 with findings suggestive of colitis. Colonoscopy on 8/19 which showed severe Crohn colitis characterized by diffuse erythema, edema, deep ulceration obliteration of vascular pattern and cobblestoning beginning the hepatic flexure and extending into and involving the rectum. 08/20/22: pain persists but controlled with pain meds. Surgery consulted given lack of improvement on IV steroids and Colonoscopy findings. -GI recommendations appreciated  -Surgery has been consulted for colectomy.  -Back on IV steroids  -On TPN n.p.o. clear liquids diet    Hyperglycemia  08/20/22: FS continues to be high. Still not meeting adequate nutrition needs. - start lantus 4U qAM and titirate  - insulin sliding scale  - monitor FS 3 times daily prior to meals and before bedtime. - check A1c    JULIETH on CKD3  Creatinine peaked at 3.80 on 8/8 08/20/22: Cr stable at 1.00.  - encouraged PO intake. - monitor renal function    HTN  // HLD  08/20/22:  Not on BP meds since admission and BP has been stable.   -Continue to hold and restart as needed  -Continue with home statin    Depression: continue with home wellbutrin and zoloft  RLS: continue with requip    Moderate Protein Calorie Malnutrition  - nutrition on board    Anemia, iron deficiency  - now on PO iron    Discharge Planning:  pending Surgical eval for colectomy given severe crohn's disease    Diet:  ADULT DIET; Clear Liquid; No red dye  ADULT ORAL NUTRITION SUPPLEMENT; Breakfast, Dinner; Clear Liquid Oral Supplement  PN-Adult Premix 4.25/5 - Peripheral Line  PN-Adult Premix 4.25/5 - Peripheral Line  DVT PPx: SCDs  Code status: Full Code      I have personally reviewed and ordered clinical lab tests and independently visualized images, tracing. I spent 36 minutes of time caring for this patient at bedside or nearby, and more than 50 percent of which was spent on coordination of care and/or patient/family counseling regarding the disease process, status , and treatment options/plan of care.   Discussed with Hoang Childs Problems:  Principal Problem:    JULIETH (acute kidney injury) ([facility] [address]  Active Problems:    Crohn's disease ([facility] [address]    Hypertension    Hyperlipidemia    Depression    Moderate protein-calorie malnutrition ([facility] [address]    Iron deficiency anemia due to chronic blood loss    RLS (restless legs syndrome)    CKD (chronic kidney disease) stage 3, GFR 30-59 ml/min ([facility])    Colitis    Anemia    Essential hypertension, benign  Resolved Problems:    Hyponatremia    Hyperkalemia    Acute hypokalemia      Objective:   Patient Vitals for the past 24 hrs:   Temp Pulse Resp BP SpO2   08/20/22 1127 99 °F (37.2 °C) 88 -- 134/85 97 %   08/20/22 0715 99 °F (37.2 °C) 82 16 137/78 94 %   08/20/22 0538 -- -- 18 -- --   08/20/22 0508 -- -- 16 -- --   08/20/22 0312 97.3 °F (36.3 °C) 76 16 133/78 98 %   08/19/22 2310 97.5 °F (36.4 °C) 75 16 125/73 96 %   08/19/22 2140 -- -- 20 -- --   08/19/22 2110 -- -- 20 -- --   08/19/22 1925 98.2 °F (36.8 °C) 77 16 121/64 97 %   08/19/22 1530 98.4 °F (36.9 °C) 98 -- 122/74 98 %   08/19/22 1320 97.9 °F (36.6 °C) 86 16 133/78 100 %   08/19/22 1302 -- 81 -- -- --   08/19/22 1259 -- 87 14 137/73 100 %   08/19/22 1252 -- 82 -- -- --   08/19/22 1249 -- 84 14 139/69 100 %   08/19/22 1239 -- 85 13 133/69 99 %   08/19/22 1229 -- 83 -- 136/69 100 %   08/19/22 1220 96.8 °F (36 °C) 79 16 120/77 100 %       Oxygen Therapy  SpO2: 97 %  Pulse via Oximetry: 87 beats per minute  Pulse Oximeter Device Mode: Continuous  Pulse Oximeter Device Location: Right, Finger  O2 Device: None (Room air)  Skin Assessment: Clean, dry, & intact  Oxygen Therapy: None (Room air)    Estimated body mass index is 25.81 kg/m² as calculated from the following:    Height as of this encounter: 5' 3\" (1.6 m). Weight as of this encounter: 145 lb 11.6 oz (66.1 kg). Intake/Output Summary (Last 24 hours) at 8/20/2022 1139  Last data filed at 8/20/2022 0920  Gross per 24 hour   Intake 740 ml   Output 450 ml   Net 290 ml         Physical Exam:     Blood pressure 134/85, pulse 88, temperature 99 °F (37.2 °C), resp. rate 16, height 5' 3\" (1.6 m), weight 145 lb 11.6 oz (66.1 kg), SpO2 97 %. General:    Alert and awake. Not in apparent distress  Head:  Normocephalic, atraumatic  Eyes:  Sclerae appear normal.  Pupils equally round. ENT:  Nares appear normal, no drainage. Moist oral mucosa  Neck:  No restricted ROM. Trachea midline   CV:   RRR. No m/r/g. No jugular venous distension. Lungs:   CTAB. No wheezing. Symmetric expansion. Abdomen: Bowel sounds present. Soft, nontender, nondistended. Extremities: No cyanosis or clubbing. No edema  Skin:     No rashes and normal coloration. Warm and dry. Neuro:  CN II-XII grossly intact. A&Ox3  Psych:  Normal mood and affect.       I have personally reviewed labs and tests showing:  Recent Labs:  Recent Results (from the past 48 hour(s))   POCT Glucose    Collection Time: 08/18/22  4:20 PM   Result Value Ref Range    POC Glucose 295 (H) 65 - 100 mg/dL    Performed by: Joseph    POCT Glucose    Collection Time: 08/18/22  9:03 PM   Result Value Ref Range    POC Glucose 259 (H) 65 - 100 mg/dL    Performed by: Darling    CBC with Auto Differential    Collection Time: 08/19/22  4:36 AM   Result Value Ref Range    WBC 8.2 4.3 - 11.1 K/uL    RBC 2.57 (L) 4.05 - 5.2 M/uL    Hemoglobin 7.8 (L) 11.7 - 15.4 g/dL    Hematocrit 23.6 (L) 35.8 - 46.3 %    MCV 91.8 79.6 - 97.8 FL    MCH 30.4 26.1 - 32.9 PG    MCHC 33.1 31.4 - 35.0 g/dL    RDW 13.8 11.9 - 14.6 %    Platelets 127 852 - 909 K/uL    MPV 8.1 (L) 9.4 - 12.3 FL    nRBC 0.00 0.0 - 0.2 K/uL    Differential Type AUTOMATED      Seg Neutrophils 80 (H) 43 - 78 %    Lymphocytes 9 (L) 13 - 44 %    Monocytes 8 4.0 - 12.0 %    Eosinophils % 0 (L) 0.5 - 7.8 %    Basophils 0 0.0 - 2.0 %    Immature Granulocytes 3 0.0 - 5.0 %    Segs Absolute 6.5 1.7 - 8.2 K/UL    Absolute Lymph # 0.8 0.5 - 4.6 K/UL    Absolute Mono # 0.7 0.1 - 1.3 K/UL    Absolute Eos # 0.0 0.0 - 0.8 K/UL    Basophils Absolute 0.0 0.0 - 0.2 K/UL    Absolute Immature Granulocyte 0.2 0.0 - 0.5 K/UL   Basic Metabolic Panel w/ Reflex to MG    Collection Time: 08/19/22  4:36 AM   Result Value Ref Range    Sodium 135 (L) 136 - 145 mmol/L    Potassium 3.7 3.5 - 5.1 mmol/L    Chloride 104 98 - 107 mmol/L    CO2 25 21 - 32 mmol/L    Anion Gap 6 (L) 7 - 16 mmol/L    Glucose 179 (H) 65 - 100 mg/dL    BUN 28 (H) 8 - 23 MG/DL    Creatinine 1.00 0.6 - 1.0 MG/DL    GFR African American >60 >60 ml/min/1.73m2    GFR Non- 60 (L) >60 ml/min/1.73m2    Calcium 8.1 (L) 8.3 - 10.4 MG/DL   Magnesium    Collection Time: 08/19/22  4:36 AM   Result Value Ref Range    Magnesium 2.2 1.8 - 2.4 mg/dL   Phosphorus    Collection Time: 08/19/22  4:36 AM   Result Value Ref Range    Phosphorus 2.0 (L) 2.3 - 3.7 MG/DL   POCT Glucose    Collection Time: 08/19/22  7:33 AM   Result Value Ref Range    POC Glucose 173 (H) 65 - 100 mg/dL    Performed by: Adrien    POCT Glucose    Collection Time: 08/19/22  4:41 PM   Result Value Ref Range    POC Glucose 352 (H) 65 - 100 mg/dL    Performed by: Adrien    POCT Glucose    Collection Time: 08/19/22  8:46 PM   Result Value Ref Range    POC Glucose 299 (H) 65 - 100 mg/dL Performed by: Titi    Basic Metabolic Panel w/ Reflex to MG    Collection Time: 08/20/22  5:02 AM   Result Value Ref Range    Sodium 136 136 - 145 mmol/L    Potassium 3.5 3.5 - 5.1 mmol/L    Chloride 106 98 - 107 mmol/L    CO2 25 21 - 32 mmol/L    Anion Gap 5 (L) 7 - 16 mmol/L    Glucose 251 (H) 65 - 100 mg/dL    BUN 27 (H) 8 - 23 MG/DL    Creatinine 1.00 0.6 - 1.0 MG/DL    GFR African American >60 >60 ml/min/1.73m2    GFR Non- 60 (L) >60 ml/min/1.73m2    Calcium 7.4 (L) 8.3 - 10.4 MG/DL   Magnesium    Collection Time: 08/20/22  5:02 AM   Result Value Ref Range    Magnesium 2.1 1.8 - 2.4 mg/dL   Albumin    Collection Time: 08/20/22  5:02 AM   Result Value Ref Range    Albumin 1.9 (L) 3.2 - 4.6 g/dL   POCT Glucose    Collection Time: 08/20/22  7:38 AM   Result Value Ref Range    POC Glucose 261 (H) 65 - 100 mg/dL    Performed by: Zane Guzman        I have personally reviewed imaging studies showing: Other Studies:  CT ABDOMEN PELVIS W IV CONTRAST Additional Contrast? Oral   Final Result   1. Circumferential wall thickening over a long segment of colon involving the   distal transverse, descending and sigmoid colon. The apparent thickening may be   accentuated by incomplete luminal distention. Colitis could be considered in the   setting of abdominal pain. 2. Hydropic gallbladder, no gallstones or obvious gallbladder wall thickening. CT ABDOMEN PELVIS RENAL STONE Additional Contrast? None   Final Result   1. No acute abdominal or pelvic abnormality noted on this limited examination   without the benefit of intravenous or oral contrast. Specifically, no renal or   ureteral stones present. There is no hydronephrosis or hydroureter. US RETROPERITONEAL COMPLETE   Final Result      Mild right hydronephrosis, of uncertain etiology. Otherwise, unremarkable   ultrasound of the kidneys.                 Current Meds:  Current Facility-Administered Medications   Medication Dose Route Frequency    insulin glargine (LANTUS) injection vial 4 Units  4 Units SubCUTAneous Daily    PN-Adult Premix 4.25/5 - Peripheral Line   IntraVENous Continuous TPN    methylPREDNISolone sodium (SOLU-MEDROL) injection 20 mg  20 mg IntraVENous Q8H    PN-Adult Premix 4.25/5 - Peripheral Line   IntraVENous Continuous TPN    fat emulsion 20 % infusion 250 mL  250 mL IntraVENous Daily    HYDROmorphone (DILAUDID) tablet 1 mg  1 mg Oral Q4H PRN    potassium chloride 20 mEq/50 mL IVPB (Central Line)  20 mEq IntraVENous PRN    Or    potassium chloride 10 mEq/100 mL IVPB (Peripheral Line)  10 mEq IntraVENous PRN    magnesium sulfate 2000 mg in 50 mL IVPB premix  2,000 mg IntraVENous PRN    sodium phosphate 10 mmol in sodium chloride 0.9 % 250 mL IVPB  10 mmol IntraVENous PRN    Or    sodium phosphate 15 mmol in sodium chloride 0.9 % 250 mL IVPB  15 mmol IntraVENous PRN    Or    sodium phosphate 20 mmol in sodium chloride 0.9 % 500 mL IVPB  20 mmol IntraVENous PRN    diatrizoate meglumine-sodium (GASTROGRAFIN) 66-10 % solution 15 mL  15 mL Oral ONCE PRN    [Held by provider] ferrous sulfate (IRON 325) tablet 325 mg  325 mg Oral BID WC    melatonin tablet 9 mg  9 mg Oral Nightly    traMADol (ULTRAM) tablet 50 mg  50 mg Oral Q6H PRN    Or    traMADol (ULTRAM) tablet 100 mg  100 mg Oral Q6H PRN    insulin lispro (HUMALOG) injection vial 0-4 Units  0-4 Units SubCUTAneous TID WC    insulin lispro (HUMALOG) injection vial 0-4 Units  0-4 Units SubCUTAneous Nightly    glucose chewable tablet 16 g  4 tablet Oral PRN    dextrose bolus 10% 125 mL  125 mL IntraVENous PRN    Or    dextrose bolus 10% 250 mL  250 mL IntraVENous PRN    glucagon (rDNA) injection 1 mg  1 mg SubCUTAneous PRN    dextrose 10 % infusion   IntraVENous Continuous PRN    hyoscyamine (LEVSIN/SL) sublingual tablet 125 mcg  125 mcg SubLINGual TID    [Held by provider] diphenoxylate-atropine (LOMOTIL) 2.5-0.025 MG per tablet 1 tablet  1 tablet Oral 4x Daily buPROPion [facility] SR) extended release tablet 150 mg  150 mg Oral QAM    pantoprazole (PROTONIX) tablet 40 mg  40 mg Oral QAM AC    pravastatin (PRAVACHOL) tablet 80 mg  80 mg Oral Nightly    sertraline (ZOLOFT) tablet 100 mg  100 mg Oral Daily    sodium chloride flush 0.9 % injection 5-40 mL  5-40 mL IntraVENous 2 times per day    sodium chloride flush 0.9 % injection 5-40 mL  5-40 mL IntraVENous PRN    0.9 % sodium chloride infusion   IntraVENous PRN    ondansetron (ZOFRAN-ODT) disintegrating tablet 4 mg  4 mg Oral Q8H PRN    Or    ondansetron (ZOFRAN) injection 4 mg  4 mg IntraVENous Q6H PRN    polyethylene glycol (GLYCOLAX) packet 17 g  17 g Oral Daily PRN    acetaminophen (TYLENOL) tablet 650 mg  650 mg Oral Q6H PRN    Or    acetaminophen (TYLENOL) suppository 650 mg  650 mg Rectal Q6H PRN    nystatin (MYCOSTATIN) 634787 UNIT/ML suspension 500,000 Units  5 mL Oral 4x Daily    morphine injection 2 mg  2 mg IntraVENous Q4H PRN    rOPINIRole (REQUIP) tablet 0.5 mg  0.5 mg Oral Nightly       Signed:  Bita Coates MD    Part of this note may have been written by using a voice dictation software. The note has been proof read but may still contain some grammatical/other typographical errors.

## 2022-08-20 NOTE — PROGRESS NOTES
Comprehensive Nutrition Assessment    Type and Reason for Visit: Reassess  Malnutrition Screening Tool: Malnutrition Screen  Have you recently lost weight without trying?: 14 to 23 pounds (2 points)  Have you been eating poorly because of a decreased appetite?: Yes (1 point)  Malnutrition Screening Tool Score: 3  TPN consult (Hospitalist)  Nutrition Recommendations/Plan:   Meals and Snacks:  Diet: Continue current order; diet advancement per GI  Continue to encouraged pt to have family/spouse provide preferred meals/food to increase kcal and protein intake during the day  Nutrition Supplement Therapy:  Medical food supplement therapy:  Continue Ensure Clear twice per day (this provides 240 kcal and 8 grams protein per bottle)    Parenteral Nutrition:  Central parenteral nutrition  new bag to begin at 1800 today  Continue: Dex 5%, 4.25% AA 2 L (85ml/hr)   Continue 250 ml  20% lipids daily  Lytes/L:   Sodium ( 85 meq NaCl), Potassium (KCl not available for inclusion in TPN at this time secondary to national shortages) Phosphorus ( 17 mmol KPO4), Calcium (4.5 meq), Magnesium 8 meq   Other additives:   MVI Monday Wednesday Friday due to [employer], MTE  Nutrition Related Medication Management: Thiamine  mg x 7 days (completed today)  Labs:   BMP daily  Mg daily x 3 days then MWF  Phos daily x 3 days then MWF    POC Glucoses/SSI Active     Malnutrition Assessment:  Malnutrition Status:  Moderate malnutrition  Context: Chronic Illness  Findings of clinical characteristics of malnutrition:   Energy Intake:  Mild decrease in energy intake (Comment) (bites at meals x3 weeks)  Weight Loss:  Mild weight loss (specify amount and time period) (20% wt loss since 6/2021)     Body Fat Loss:  Mild body fat loss Triceps, Fat Overlying Ribs   Muscle Mass Loss:  Mild muscle mass loss Clavicles (pectoralis & deltoids), Thigh (quadraceps), Calf (gastrocnemius), Scapula (trapezius), Hand (interosseous)  Fluid Accumulation: POCGLU 261 08/20/2022 07:38 AM    POCGLU 299 08/19/2022 08:46 PM    POCGLU 352 08/19/2022 04:41 PM    POCGLU 173 08/19/2022 07:33 AM    POCGLU 259 08/18/2022 09:03 PM    POCGLU 295 08/18/2022 04:20 PM       Labs reviewed and remarkable for slight increase in Na. K continues to downtrend. Increasing K in TPN this evening. Glucose ranging from 251-352 since IV steroids started. Discussed pt with Dr. Kim Hodgkins via [employer]. Lantus started today. Deferring increase CHO in TPN at this time until glucose is better controlled. Current Nutrition Therapies:  ADULT DIET; Clear Liquid; No red dye  ADULT ORAL NUTRITION SUPPLEMENT; Breakfast, Dinner; Clear Liquid Oral Supplement  PN-Adult Premix 4.25/5 - Peripheral Line  Current Parenteral Nutrition Orders:  Type and Formula: Premix Central (Dex 5%; 4.25% AA)   Lipids: 250ml, Daily  Duration: Continuous  Rate/Volume: 85ml/hr (2L)  Current PN Order Provides: infusing per order  Goal PN Orders Provides: 1180 kcal/d (78% of needs), 85 grams of protein/d (108% of needs), 100 grams of CHO/d and 2250 ml of total volume/d    Current Intake:   Average Meal Intake: % (CLQ) Average Supplements Intake: 26-50%      Anthropometric Measures:  Height: 5' 3\" (160 cm)  Current Body Wt: 145 lb 11.6 oz (66.1 kg) (8/20), Weight source: Bed Scale  BMI: 25.8, Normal Weight (BMI 18.5-24. 9)  Admission Body Weight: 133 lb 13.1 oz (60.7 kg) (8/9; bed scale)  Ideal Body Weight (Kg) (Calculated): 52 kg (115 lbs), 116.4 %  Usual Body Wt: 167 lb (75.8 kg) (6/8/21; MD office visit), Percent weight change: -19.9       Edema:Peripheral Vascular  Peripheral Vascular (WDL): Within Defined Limits   BMI Category Normal Weight (BMI 18.5-24. 9)  Estimated Daily Nutrient Needs:  Energy (kcal/day): 6454-5718 (Kcal/kg (25-30) Weight used:   Current (60.7kg)  Protein (g/day): 61-79 (1-1.3 g/kg) Weight Used: (Current) 61 kg (8/10)  Fluid (ml/day):   (1 ml/kcal)    Nutrition Diagnosis:   Inadequate oral intake related to altered GI function, altered taste perception (loss of appetite, food preferences) as evidenced by  (pt reported barriers, po <25% needs, reports significant gi losses)  Moderate malnutrition, In context of chronic illness related to inadequate protein-energy intake as evidenced by Criteria as identified in malnutrition assessment  Nutrition Interventions:   Food and/or Nutrient Delivery: Continue Current Diet, Modify Parenteral Nutrition     Coordination of Nutrition Care: Continue to monitor while inpatient  Plan of Care discussed with: Christie RN, Dr. Pooja Calderon via [employer]    Goals:   Previous Goal Met: Progressing toward Goal(s)  Active Goal:  (TPN at goal rate in 3 days)       Nutrition Monitoring and Evaluation:      Food/Nutrient Intake Outcomes: Food and Nutrient Intake, Parenteral Nutrition Intake/Tolerance  Physical Signs/Symptoms Outcomes: Biochemical Data, GI Status, Fluid Status or Edema, Meal Time Behavior, Weight    Discharge Planning:     Too soon to determine    Lillian Rucker MS, RDN, LD  Contact: [phone]

## 2022-08-20 NOTE — PROGRESS NOTES
Pt resting in bed eating dinner. On RA. No s/sx of distress noted. Denies any pain at this time. PPN infusing at 85ml/hr. Call light within reach.

## 2022-08-20 NOTE — PROGRESS NOTES
Pt in bed resting on RA, bed in lowest, locked position, call light in reach, PPN infusing at 85 mL/hr, SBAR given to [employer], RN.

## 2022-08-20 NOTE — PROGRESS NOTES
[facility] Progress Note         Admit Date:  8/8/2022    Today's Date:  8/20/2022    CC:  Crohn's disease, abdominal pain, diarrhea    Subjective: Tolerating CLD. No BM since colonoscopy yesterday. Abd pain improved, per pt.     Medications:   Current Facility-Administered Medications   Medication Dose Route Frequency    methylPREDNISolone sodium (SOLU-MEDROL) injection 20 mg  20 mg IntraVENous Q8H    PN-Adult Premix 4.25/5 - Peripheral Line   IntraVENous Continuous TPN    fat emulsion 20 % infusion 250 mL  250 mL IntraVENous Daily    HYDROmorphone (DILAUDID) tablet 1 mg  1 mg Oral Q4H PRN    thiamine (B-1) injection 100 mg  100 mg IntraVENous Daily    potassium chloride 20 mEq/50 mL IVPB (Central Line)  20 mEq IntraVENous PRN    Or    potassium chloride 10 mEq/100 mL IVPB (Peripheral Line)  10 mEq IntraVENous PRN    magnesium sulfate 2000 mg in 50 mL IVPB premix  2,000 mg IntraVENous PRN    sodium phosphate 10 mmol in sodium chloride 0.9 % 250 mL IVPB  10 mmol IntraVENous PRN    Or    sodium phosphate 15 mmol in sodium chloride 0.9 % 250 mL IVPB  15 mmol IntraVENous PRN    Or    sodium phosphate 20 mmol in sodium chloride 0.9 % 500 mL IVPB  20 mmol IntraVENous PRN    diatrizoate meglumine-sodium (GASTROGRAFIN) 66-10 % solution 15 mL  15 mL Oral ONCE PRN    [Held by provider] ferrous sulfate (IRON 325) tablet 325 mg  325 mg Oral BID WC    melatonin tablet 9 mg  9 mg Oral Nightly    traMADol (ULTRAM) tablet 50 mg  50 mg Oral Q6H PRN    Or    traMADol (ULTRAM) tablet 100 mg  100 mg Oral Q6H PRN    insulin lispro (HUMALOG) injection vial 0-4 Units  0-4 Units SubCUTAneous TID WC    insulin lispro (HUMALOG) injection vial 0-4 Units  0-4 Units SubCUTAneous Nightly    glucose chewable tablet 16 g  4 tablet Oral PRN    dextrose bolus 10% 125 mL  125 mL IntraVENous PRN    Or    dextrose bolus 10% 250 mL  250 mL IntraVENous PRN    glucagon (rDNA) injection 1 mg  1 mg SubCUTAneous PRN    dextrose 10 % infusion   IntraVENous Continuous PRN    hyoscyamine (LEVSIN/SL) sublingual tablet 125 mcg  125 mcg SubLINGual TID    [Held by provider] diphenoxylate-atropine (LOMOTIL) 2.5-0.025 MG per tablet 1 tablet  1 tablet Oral 4x Daily    buPROPion [facility]) extended release tablet 150 mg  150 mg Oral QAM    pantoprazole (PROTONIX) tablet 40 mg  40 mg Oral QAM AC    pravastatin (PRAVACHOL) tablet 80 mg  80 mg Oral Nightly    sertraline (ZOLOFT) tablet 100 mg  100 mg Oral Daily    sodium chloride flush 0.9 % injection 5-40 mL  5-40 mL IntraVENous 2 times per day    sodium chloride flush 0.9 % injection 5-40 mL  5-40 mL IntraVENous PRN    0.9 % sodium chloride infusion   IntraVENous PRN    ondansetron (ZOFRAN-ODT) disintegrating tablet 4 mg  4 mg Oral Q8H PRN    Or    ondansetron (ZOFRAN) injection 4 mg  4 mg IntraVENous Q6H PRN    polyethylene glycol (GLYCOLAX) packet 17 g  17 g Oral Daily PRN    acetaminophen (TYLENOL) tablet 650 mg  650 mg Oral Q6H PRN    Or    acetaminophen (TYLENOL) suppository 650 mg  650 mg Rectal Q6H PRN    nystatin (MYCOSTATIN) 226712 UNIT/ML suspension 500,000 Units  5 mL Oral 4x Daily    morphine injection 2 mg  2 mg IntraVENous Q4H PRN    rOPINIRole (REQUIP) tablet 0.5 mg  0.5 mg Oral Nightly       Review of Systems:  ROS was obtained, with pertinent positives as listed above. No chest pain or SOB. Diet:  regular diet    Objective:   Vitals:  /78   Pulse 82   Temp 99 °F (37.2 °C) (Oral)   Resp 16   Ht 5' 3\" (1.6 m)   Wt 145 lb 11.6 oz (66.1 kg)   SpO2 94%   BMI 25.81 kg/m²   Intake/Output:  No intake/output data recorded.   08/18 1901 - 08/20 0700  In: 500 [I.V.:500]  Out: 1250 [Urine:450]  Exam:  General appearance: alert, cooperative, no distress, moon facies noted  Lungs: clear to auscultation bilaterally anteriorly  Heart: regular rate and rhythm  Abdomen: soft, mildly tender periumbilical . Bowel sounds normal. No masses, no organomegaly  Extremities: extremities normal, atraumatic, no cyanosis or edema  Neuro:  alert and oriented    Data Review (Labs):    Recent Labs     08/18/22  0606 08/19/22  0436 08/20/22  0502   WBC  --  8.2  --    HGB  --  7.8*  --    HCT  --  23.6*  --    PLT  --  176  --    MCV  --  91.8  --     135* 136   K 3.8 3.7 3.5    104 106   CO2 26 25 25   BUN 29* 28* 27*   CREATININE 1.00 1.00 1.00   CALCIUM 7.8* 8.1* 7.4*   MG 2.1 2.2 2.1   PHOS  --  2.0*  --    GLUCOSE 207* 179* 251*     COLONOSCOPY 8/19/2022  PRE-OP DIAGNOSIS:  History of Crohn's colitis, has failed multiple modalities and symptoms refractory to IV steroids  POST-OP DIAGNOSIS:  Severe Crohn's colitis extending from hepatic flexure through rectum with perianal disease  Right side and terminal ileum do not appear to be involved  FINDINGS:  Significant perianal disease with apparent old fistulous tracks as well as anal skin tags and external hemorrhoids were noted on digital rectal exam. Severe Crohn's colitis characterized by diffuse erythema, edema, deep ulcerations, obliteration of the vascular pattern, and cobblestoning was noted beginning in the hepatic flexure and extending to and involving the rectum. The terminal ileum, cecum, and ascending colon did not appear to be involved. Biopsies were obtained from the terminal ileum, right colon, transverse colon, left colon.    PLAN:  - She has severe disease that has been refractory to multiple modalities of treatment along with IV steroids  - Change back to IV steroids  - Okay for clear liquid diet today  - Surgical consult for surgical resection  - Will follow  Magda Cali MD  [facility]    Assessment:     Principal Problem:    JULIETH (acute kidney injury) [facility])  Active Problems:    Crohn's disease ([facility] [address]    Hypertension    Hyperlipidemia    Depression    Moderate protein-calorie malnutrition ([facility] [address]    Iron deficiency anemia due to chronic blood loss    RLS (restless legs syndrome)    CKD (chronic kidney disease) stage 3, GFR 30-59 ml/min (HCC)    Colitis    Anemia    Essential hypertension, benign  Resolved Problems:    Hyponatremia    Hyperkalemia    Acute hypokalemia    65 y.o. female with PMH including but not limited to crohns disease (lg and sm bowel), HTN, CKD3, depression, HLP, RLS, who we are following for Crohn's flare with abdominal cramping, nausea, rectal spasm, non-bloody diarrhea, dehydration, and wt loss. She has failed Remicade, Humira, Entyvio, Stelara. Currenlty on Harika Avelino but not in remission. Recent Cdiff neg, Calprotectin >1200 and CT evidence of bowel thickening cw CD. Has electrolyte imbalance and JULIETH on admission. 8/10/22:  Renal function improved  Sxs better  Mag and k low  Iron low     8/11/22  Almost ready for discharge  Cont ivf for JULIETH  Switch to prednisone  Tramadol prn     8/12/22  Did not tolerate po switch  Afraid if she goes home she will be right back  Creat better  Does not like to take pain meds     8/13/22  Not any better  Need to consider TPN  There is a stool organism being evaluated  Last CT w/o contrast  Alb 2.0     8/14/22:  she is better with iv steroids but having some side effects such as shaking  There is still a pending stool culture hidden under the results tab  On cipro / flagyl/ and steroids  If she does not respond she may need a colectomy and this was discussed with her  Hx of SB disease also so may need to get a SBFT    8/15:  \"Feeling much better\" this AM.  One small stool reported this AM by RN, none throughout the night, although she reports having \"a dozen\" episodes of diarrhea in the past 24 hours. Abd pain persists, improves with dilaudid. Poor appetite, although tolerating PO without significant n/v. Apprehensive to transition to PO steroids. 8/16/22:  Abdominal pain and diarrhea improving. Tolerating some po intake, but remains on TPN. Stool culture final result negative.      8/17/22:  Diarrhea increased overnight - patient reports 8-10 episodes, ~200 cc documented overall. Also c/o increased pain. Last night reported \"oral steroids aren't working,\" although she had not received an oral steroids and this would have been before her IV steroids would have been scheduled. Tolerating regular diet, ate this AM.    8/18:  Counted 12 small stools in last 24 hours she had logged in a notebook at bedside. 170 cc documented. Still c/o abd pain, responds to dilaudid. Plan for colon tomorrow. 8/19:  Colonoscopy with severe Crohn's colitis from hepatic flexure through rectum with perianal involvement, right side and TI was normal.  Surgery consulted. Plan:     - F/u pathology  - She has severe disease that has been refractory to multiple modalities of treatment along with IV steroids  - Continue IV solumedrol, TPN  - Surgery consulted for resection, formal recommendations pending  - Will follow      Avril Frausto M.D. [facility]

## 2022-08-20 NOTE — PROGRESS NOTES
Pt resting in bed watching tv. No s/sx of distress noted. 1mg PO dilaudid given for 8/10 abd pain. Call light within reach. Will continue to monitor.

## 2022-08-21 LAB
ANION GAP SERPL CALC-SCNC: 7 MMOL/L (ref 7–16)
BUN SERPL-MCNC: 30 MG/DL (ref 8–23)
CALCIUM SERPL-MCNC: 7.6 MG/DL (ref 8.3–10.4)
CHLORIDE SERPL-SCNC: 105 MMOL/L (ref 98–107)
CO2 SERPL-SCNC: 25 MMOL/L (ref 21–32)
CREAT SERPL-MCNC: 1 MG/DL (ref 0.6–1)
GLUCOSE BLD STRIP.AUTO-MCNC: 267 MG/DL (ref 65–100)
GLUCOSE BLD STRIP.AUTO-MCNC: 275 MG/DL (ref 65–100)
GLUCOSE BLD STRIP.AUTO-MCNC: 285 MG/DL (ref 65–100)
GLUCOSE BLD STRIP.AUTO-MCNC: 325 MG/DL (ref 65–100)
GLUCOSE SERPL-MCNC: 241 MG/DL (ref 65–100)
POTASSIUM SERPL-SCNC: 3.7 MMOL/L (ref 3.5–5.1)
SERVICE CMNT-IMP: ABNORMAL
SODIUM SERPL-SCNC: 137 MMOL/L (ref 136–145)

## 2022-08-21 PROCEDURE — 6360000002 HC RX W HCPCS: Performed by: STUDENT IN AN ORGANIZED HEALTH CARE EDUCATION/TRAINING PROGRAM

## 2022-08-21 PROCEDURE — 6370000000 HC RX 637 (ALT 250 FOR IP): Performed by: INTERNAL MEDICINE

## 2022-08-21 PROCEDURE — 2580000003 HC RX 258: Performed by: INTERNAL MEDICINE

## 2022-08-21 PROCEDURE — 80048 BASIC METABOLIC PNL TOTAL CA: CPT

## 2022-08-21 PROCEDURE — 6370000000 HC RX 637 (ALT 250 FOR IP): Performed by: HOSPITALIST

## 2022-08-21 PROCEDURE — 1100000000 HC RM PRIVATE

## 2022-08-21 PROCEDURE — 82962 GLUCOSE BLOOD TEST: CPT

## 2022-08-21 PROCEDURE — 6370000000 HC RX 637 (ALT 250 FOR IP): Performed by: PHYSICIAN ASSISTANT

## 2022-08-21 RX ORDER — INSULIN GLARGINE 100 [IU]/ML
6 INJECTION, SOLUTION SUBCUTANEOUS DAILY
Status: DISCONTINUED | OUTPATIENT
Start: 2022-08-21 | End: 2022-08-22

## 2022-08-21 RX ADMIN — NYSTATIN 500000 UNITS: 100000 SUSPENSION ORAL at 12:26

## 2022-08-21 RX ADMIN — INSULIN LISPRO 2 UNITS: 100 INJECTION, SOLUTION INTRAVENOUS; SUBCUTANEOUS at 12:26

## 2022-08-21 RX ADMIN — PANTOPRAZOLE SODIUM 40 MG: 40 TABLET, DELAYED RELEASE ORAL at 06:14

## 2022-08-21 RX ADMIN — NYSTATIN 500000 UNITS: 100000 SUSPENSION ORAL at 21:39

## 2022-08-21 RX ADMIN — BUPROPION HYDROCHLORIDE 150 MG: 150 TABLET, EXTENDED RELEASE ORAL at 08:49

## 2022-08-21 RX ADMIN — SODIUM CHLORIDE, PRESERVATIVE FREE 10 ML: 5 INJECTION INTRAVENOUS at 08:50

## 2022-08-21 RX ADMIN — HYOSCYAMINE SULFATE 125 MCG: 0.12 TABLET ORAL at 08:49

## 2022-08-21 RX ADMIN — METHYLPREDNISOLONE SODIUM SUCCINATE 20 MG: 40 INJECTION, POWDER, FOR SOLUTION INTRAMUSCULAR; INTRAVENOUS at 21:38

## 2022-08-21 RX ADMIN — NYSTATIN 500000 UNITS: 100000 SUSPENSION ORAL at 16:57

## 2022-08-21 RX ADMIN — HYDROMORPHONE HYDROCHLORIDE 1 MG: 2 TABLET ORAL at 10:38

## 2022-08-21 RX ADMIN — HYDROMORPHONE HYDROCHLORIDE 1 MG: 2 TABLET ORAL at 05:29

## 2022-08-21 RX ADMIN — ROPINIROLE HYDROCHLORIDE 0.5 MG: 0.5 TABLET, FILM COATED ORAL at 21:38

## 2022-08-21 RX ADMIN — INSULIN LISPRO 2 UNITS: 100 INJECTION, SOLUTION INTRAVENOUS; SUBCUTANEOUS at 08:49

## 2022-08-21 RX ADMIN — SERTRALINE HYDROCHLORIDE 100 MG: 25 TABLET ORAL at 08:49

## 2022-08-21 RX ADMIN — HYDROMORPHONE HYDROCHLORIDE 1 MG: 2 TABLET ORAL at 21:36

## 2022-08-21 RX ADMIN — HYOSCYAMINE SULFATE 125 MCG: 0.12 TABLET ORAL at 14:03

## 2022-08-21 RX ADMIN — Medication 9 MG: at 21:38

## 2022-08-21 RX ADMIN — INSULIN GLARGINE 6 UNITS: 100 INJECTION, SOLUTION SUBCUTANEOUS at 08:49

## 2022-08-21 RX ADMIN — SODIUM CHLORIDE, PRESERVATIVE FREE 10 ML: 5 INJECTION INTRAVENOUS at 21:48

## 2022-08-21 RX ADMIN — INSULIN LISPRO 4 UNITS: 100 INJECTION, SOLUTION INTRAVENOUS; SUBCUTANEOUS at 21:41

## 2022-08-21 RX ADMIN — METHYLPREDNISOLONE SODIUM SUCCINATE 20 MG: 40 INJECTION, POWDER, FOR SOLUTION INTRAMUSCULAR; INTRAVENOUS at 05:13

## 2022-08-21 RX ADMIN — HYOSCYAMINE SULFATE 125 MCG: 0.12 TABLET ORAL at 21:39

## 2022-08-21 RX ADMIN — METHYLPREDNISOLONE SODIUM SUCCINATE 20 MG: 40 INJECTION, POWDER, FOR SOLUTION INTRAMUSCULAR; INTRAVENOUS at 12:26

## 2022-08-21 RX ADMIN — NYSTATIN 500000 UNITS: 100000 SUSPENSION ORAL at 08:49

## 2022-08-21 RX ADMIN — INSULIN LISPRO 2 UNITS: 100 INJECTION, SOLUTION INTRAVENOUS; SUBCUTANEOUS at 16:56

## 2022-08-21 RX ADMIN — HYDROMORPHONE HYDROCHLORIDE 1 MG: 2 TABLET ORAL at 14:06

## 2022-08-21 RX ADMIN — PRAVASTATIN SODIUM 80 MG: 80 TABLET ORAL at 21:38

## 2022-08-21 ASSESSMENT — PAIN SCALES - GENERAL
PAINLEVEL_OUTOF10: 8
PAINLEVEL_OUTOF10: 9
PAINLEVEL_OUTOF10: 8
PAINLEVEL_OUTOF10: 0
PAINLEVEL_OUTOF10: 2
PAINLEVEL_OUTOF10: 6
PAINLEVEL_OUTOF10: 6
PAINLEVEL_OUTOF10: 8

## 2022-08-21 ASSESSMENT — PAIN DESCRIPTION - LOCATION
LOCATION: ABDOMEN

## 2022-08-21 ASSESSMENT — PAIN DESCRIPTION - ORIENTATION
ORIENTATION: UPPER
ORIENTATION: RIGHT
ORIENTATION: UPPER
ORIENTATION: UPPER

## 2022-08-21 ASSESSMENT — PAIN DESCRIPTION - DESCRIPTORS
DESCRIPTORS: ACHING
DESCRIPTORS: ACHING
DESCRIPTORS: CRAMPING

## 2022-08-21 NOTE — PROGRESS NOTES
Pt resting in bed awake. Alert and oriented times 3 at this time. On RA. No s/sx of distress noted. Denies any pain at this time. Encouraged to call for assistance as needed. Ronald light within reach. Will continue to monitor.

## 2022-08-21 NOTE — PROGRESS NOTES
Comprehensive Nutrition Assessment    Type and Reason for Visit: Reassess  Malnutrition Screening Tool: Malnutrition Screen  Have you recently lost weight without trying?: 14 to 23 pounds (2 points)  Have you been eating poorly because of a decreased appetite?: Yes (1 point)  Malnutrition Screening Tool Score: 3  TPN consult (Hospitalist)  Nutrition Recommendations/Plan:   Meals and Snacks:  Diet: Continue current order  Continue to encouraged pt to have family/spouse provide preferred meals/food to increase kcal and protein intake during the day  Nutrition Supplement Therapy:  Medical food supplement therapy:  Change Ensure Clear to Ensure Enlive three times per day (this provides 350 kcal and 20 grams protein per bottle)    Parenteral Nutrition:  Wean TPN: decrease rate to 42ml/hr at 1700 and stop TPN at 1800  D/C lipids  Labs:   Discontinue TPN labs    Lomotil held currently per GI, consider resuming if diarrhea reoccurs. Malnutrition Assessment:  Malnutrition Status: Moderate malnutrition  Context: Chronic Illness  Findings of clinical characteristics of malnutrition:   Energy Intake:  Mild decrease in energy intake (Comment) (bites at meals x3 weeks)  Weight Loss:  Mild weight loss (specify amount and time period) (20% wt loss since 6/2021)     Body Fat Loss:  Mild body fat loss Triceps, Fat Overlying Ribs   Muscle Mass Loss:  Mild muscle mass loss Clavicles (pectoralis & deltoids), Thigh (quadraceps), Calf (gastrocnemius), Scapula (trapezius), Hand (interosseous)  Fluid Accumulation:  No significant fluid accumulation     Strength:  Not Performed  Nutrition Assessment:  Nutrition History: Pt states she has been having poor po intake x3 weeks PTA consuming bites at meals. She reports taste changes, loss of appetite, and N/V/D. Pt reports ongoing wt loss of the past 2 years weighing 200lbs prior to wt loss. Per EMR, pt weighed 167lbs 6/8/21.      Do You Have Any Cultural, Scientology, or Ethnic Food 05:18 AM    PHOS 2.0 08/19/2022 04:36 AM    MG 2.1 08/20/2022 05:02 AM      Lab Results   Component Value Date/Time    POCGLU 285 08/21/2022 08:25 AM    POCGLU 198 08/20/2022 08:47 PM    POCGLU 272 08/20/2022 04:15 PM    POCGLU 238 08/20/2022 11:29 AM    POCGLU 261 08/20/2022 07:38 AM    POCGLU 299 08/19/2022 08:46 PM       Labs reviewed and remarkable for slight increase in Na. K improving,Glucose ranging from 198-285 since start of 4 units of Lantus. Current Nutrition Therapies:  PN-Adult Premix 4.25/5 - Peripheral Line  ADULT DIET; Regular; GI Pine (GERD/Peptic Ulcer); Low Fiber; No red dye  ADULT ORAL NUTRITION SUPPLEMENT; Breakfast, Dinner; Standard High Calorie/High Protein Oral Supplement  Current Parenteral Nutrition Orders:  Type and Formula: Premix Central (Dex 5%; 4.25% AA)   Lipids: 250ml, Daily  Duration: Continuous  Rate/Volume: 85ml/hr (2L)  Current PN Order Provides: infusing per order  Goal PN Orders Provides: 1180 kcal/d (78% of needs), 85 grams of protein/d (108% of needs), 100 grams of CHO/d and 2250 ml of total volume/d    Current Intake:   Average Meal Intake: 26-50% Average Supplements Intake: 26-50%      Anthropometric Measures:  Height: 5' 3\" (160 cm)  Current Body Wt: 145 lb 11.6 oz (66.1 kg) (8/20), Weight source: Bed Scale  BMI: 25.8, Normal Weight (BMI 18.5-24. 9)  Admission Body Weight: 133 lb 13.1 oz (60.7 kg) (8/9; bed scale)  Ideal Body Weight (Kg) (Calculated): 52 kg (115 lbs), 116.4 %  Usual Body Wt: 167 lb (75.8 kg) (6/8/21; MD office visit), Percent weight change: -19.9       Edema:    BMI Category Normal Weight (BMI 18.5-24. 9)  Estimated Daily Nutrient Needs:  Energy (kcal/day): 7970-0292 (Kcal/kg (25-30) Weight used:   Current (60.7kg)  Protein (g/day): 61-79 (1-1.3 g/kg) Weight Used: (Current) 61 kg (8/10)  Fluid (ml/day):   (1 ml/kcal)    Nutrition Diagnosis:   Inadequate oral intake related to altered GI function, early satiety (food preferences) as evidenced by  (pt

## 2022-08-21 NOTE — PROGRESS NOTES
[facility] Progress Note         Admit Date:  8/8/2022    Today's Date:  8/21/2022    CC:  Crohn's disease, abdominal pain, diarrhea    Subjective: Only 1 BM since colonoscopy. Abd pain improved and tolerating diet.     Medications:   Current Facility-Administered Medications   Medication Dose Route Frequency    insulin glargine (LANTUS) injection vial 6 Units  6 Units SubCUTAneous Daily    PN-Adult Premix 4.25/5 - Peripheral Line   IntraVENous Continuous TPN    methylPREDNISolone sodium (SOLU-MEDROL) injection 20 mg  20 mg IntraVENous Q8H    fat emulsion 20 % infusion 250 mL  250 mL IntraVENous Daily    HYDROmorphone (DILAUDID) tablet 1 mg  1 mg Oral Q4H PRN    potassium chloride 20 mEq/50 mL IVPB (Central Line)  20 mEq IntraVENous PRN    Or    potassium chloride 10 mEq/100 mL IVPB (Peripheral Line)  10 mEq IntraVENous PRN    magnesium sulfate 2000 mg in 50 mL IVPB premix  2,000 mg IntraVENous PRN    sodium phosphate 10 mmol in sodium chloride 0.9 % 250 mL IVPB  10 mmol IntraVENous PRN    Or    sodium phosphate 15 mmol in sodium chloride 0.9 % 250 mL IVPB  15 mmol IntraVENous PRN    Or    sodium phosphate 20 mmol in sodium chloride 0.9 % 500 mL IVPB  20 mmol IntraVENous PRN    diatrizoate meglumine-sodium (GASTROGRAFIN) 66-10 % solution 15 mL  15 mL Oral ONCE PRN    [Held by provider] ferrous sulfate (IRON 325) tablet 325 mg  325 mg Oral BID WC    melatonin tablet 9 mg  9 mg Oral Nightly    traMADol (ULTRAM) tablet 50 mg  50 mg Oral Q6H PRN    Or    traMADol (ULTRAM) tablet 100 mg  100 mg Oral Q6H PRN    insulin lispro (HUMALOG) injection vial 0-4 Units  0-4 Units SubCUTAneous TID WC    insulin lispro (HUMALOG) injection vial 0-4 Units  0-4 Units SubCUTAneous Nightly    glucose chewable tablet 16 g  4 tablet Oral PRN    dextrose bolus 10% 125 mL  125 mL IntraVENous PRN    Or    dextrose bolus 10% 250 mL  250 mL IntraVENous PRN    glucagon (rDNA) injection 1 mg  1 mg SubCUTAneous PRN dextrose 10 % infusion   IntraVENous Continuous PRN    hyoscyamine (LEVSIN/SL) sublingual tablet 125 mcg  125 mcg SubLINGual TID    [Held by provider] diphenoxylate-atropine (LOMOTIL) 2.5-0.025 MG per tablet 1 tablet  1 tablet Oral 4x Daily    buPROPion [facility]) extended release tablet 150 mg  150 mg Oral QAM    pantoprazole (PROTONIX) tablet 40 mg  40 mg Oral QAM AC    pravastatin (PRAVACHOL) tablet 80 mg  80 mg Oral Nightly    sertraline (ZOLOFT) tablet 100 mg  100 mg Oral Daily    sodium chloride flush 0.9 % injection 5-40 mL  5-40 mL IntraVENous 2 times per day    sodium chloride flush 0.9 % injection 5-40 mL  5-40 mL IntraVENous PRN    0.9 % sodium chloride infusion   IntraVENous PRN    ondansetron (ZOFRAN-ODT) disintegrating tablet 4 mg  4 mg Oral Q8H PRN    Or    ondansetron (ZOFRAN) injection 4 mg  4 mg IntraVENous Q6H PRN    polyethylene glycol (GLYCOLAX) packet 17 g  17 g Oral Daily PRN    acetaminophen (TYLENOL) tablet 650 mg  650 mg Oral Q6H PRN    Or    acetaminophen (TYLENOL) suppository 650 mg  650 mg Rectal Q6H PRN    nystatin (MYCOSTATIN) 367235 UNIT/ML suspension 500,000 Units  5 mL Oral 4x Daily    morphine injection 2 mg  2 mg IntraVENous Q4H PRN    rOPINIRole (REQUIP) tablet 0.5 mg  0.5 mg Oral Nightly       Review of Systems:  ROS was obtained, with pertinent positives as listed above. No chest pain or SOB. Diet:  regular diet    Objective:   Vitals:  /89   Pulse 79   Temp 97.9 °F (36.6 °C) (Oral)   Resp 19   Ht 5' 3\" (1.6 m)   Wt 145 lb 11.6 oz (66.1 kg)   SpO2 96%   BMI 25.81 kg/m²   Intake/Output:  No intake/output data recorded.   08/19 1901 - 08/21 0700  In: 12 [P.O.:960]  Out: -   Exam:  General appearance: alert, cooperative, no distress, moon facies noted  Lungs: clear to auscultation bilaterally anteriorly  Heart: regular rate and rhythm  Abdomen: soft, mildly tender periumbilical . Bowel sounds normal. No masses, no organomegaly  Extremities: extremities normal, atraumatic, no cyanosis or edema  Neuro:  alert and oriented    Data Review (Labs):    Recent Labs     08/19/22  0436 08/20/22  0502 08/21/22  0518   WBC 8.2  --   --    HGB 7.8*  --   --    HCT 23.6*  --   --      --   --    MCV 91.8  --   --    * 136 137   K 3.7 3.5 3.7    106 105   CO2 25 25 25   BUN 28* 27* 30*   CREATININE 1.00 1.00 1.00   CALCIUM 8.1* 7.4* 7.6*   MG 2.2 2.1  --    PHOS 2.0*  --   --    GLUCOSE 179* 251* 241*     COLONOSCOPY 8/19/2022  PRE-OP DIAGNOSIS:  History of Crohn's colitis, has failed multiple modalities and symptoms refractory to IV steroids  POST-OP DIAGNOSIS:  Severe Crohn's colitis extending from hepatic flexure through rectum with perianal disease  Right side and terminal ileum do not appear to be involved  FINDINGS:  Significant perianal disease with apparent old fistulous tracks as well as anal skin tags and external hemorrhoids were noted on digital rectal exam. Severe Crohn's colitis characterized by diffuse erythema, edema, deep ulcerations, obliteration of the vascular pattern, and cobblestoning was noted beginning in the hepatic flexure and extending to and involving the rectum. The terminal ileum, cecum, and ascending colon did not appear to be involved. Biopsies were obtained from the terminal ileum, right colon, transverse colon, left colon.    PLAN:  - She has severe disease that has been refractory to multiple modalities of treatment along with IV steroids  - Change back to IV steroids  - Okay for clear liquid diet today  - Surgical consult for surgical resection  - Will follow  Avril Frausto MD  [facility]    Assessment:     Principal Problem:    JULIETH (acute kidney injury) [facility])  Active Problems:    Crohn's disease ([facility] [address]    Hypertension    Hyperlipidemia    Depression    Moderate protein-calorie malnutrition ([facility] [address]    Iron deficiency anemia due to chronic blood loss    Hyperglycemia    RLS (restless legs syndrome)    CKD (chronic kidney disease) stage 3, GFR 30-59 ml/min (HCC)    Colitis    Anemia    Essential hypertension, benign  Resolved Problems:    Hyponatremia    Hyperkalemia    Acute hypokalemia    65 y.o. female with PMH including but not limited to crohns disease (lg and sm bowel), HTN, CKD3, depression, HLP, RLS, who we are following for Crohn's flare with abdominal cramping, nausea, rectal spasm, non-bloody diarrhea, dehydration, and wt loss. She has failed Remicade, Humira, Entyvio, Stelara. Currenlty on Lark Gamma but not in remission. Recent Cdiff neg, Calprotectin >1200 and CT evidence of bowel thickening cw CD. Has electrolyte imbalance and JULIETH on admission. 8/10/22:  Renal function improved  Sxs better  Mag and k low  Iron low     8/11/22  Almost ready for discharge  Cont ivf for JULIETH  Switch to prednisone  Tramadol prn     8/12/22  Did not tolerate po switch  Afraid if she goes home she will be right back  Creat better  Does not like to take pain meds     8/13/22  Not any better  Need to consider TPN  There is a stool organism being evaluated  Last CT w/o contrast  Alb 2.0     8/14/22:  she is better with iv steroids but having some side effects such as shaking  There is still a pending stool culture hidden under the results tab  On cipro / flagyl/ and steroids  If she does not respond she may need a colectomy and this was discussed with her  Hx of SB disease also so may need to get a SBFT    8/15:  \"Feeling much better\" this AM.  One small stool reported this AM by RN, none throughout the night, although she reports having \"a dozen\" episodes of diarrhea in the past 24 hours. Abd pain persists, improves with dilaudid. Poor appetite, although tolerating PO without significant n/v. Apprehensive to transition to PO steroids. 8/16/22:  Abdominal pain and diarrhea improving. Tolerating some po intake, but remains on TPN. Stool culture final result negative.      8/17/22:  Diarrhea increased overnight - patient reports 8-10 episodes, ~200 cc documented overall. Also c/o increased pain. Last night reported \"oral steroids aren't working,\" although she had not received an oral steroids and this would have been before her IV steroids would have been scheduled. Tolerating regular diet, ate this AM.    8/18/22:  Counted 12 small stools in last 24 hours she had logged in a notebook at bedside. 170 cc documented. Still c/o abd pain, responds to dilaudid. Plan for colon tomorrow. 8/19/22:  Colonoscopy with severe Crohn's colitis from hepatic flexure through rectum with perianal involvement, right side and TI was normal.  Surgery consulted. 8/20/22: Tolerating CLD. No BM since colonoscopy yesterday. Abd pain improved, per pt. Plan:     - F/u pathology  - She has severe disease that has been refractory to multiple modalities of treatment along with IV steroids  - Continue IV solumedrol, TPN  - Surgery recommended eval by dedicated CRS (Dr. Pamela Lopez or [facility])  - I have messaged Dr. Pamela Lopez as he has seen her within the past year; may consider transfer to [facility] for CRS eval by them if no response  - Advance diet  - Will follow      Gaurav Cobb M.D. [facility]

## 2022-08-21 NOTE — PROGRESS NOTES
Pt in bed resting on RA, bed in lowest, locked position, call light in reach, SBAR given to [employer], RN.

## 2022-08-21 NOTE — PROGRESS NOTES
Pt resting in bed eating dinner. On RA. No s/sx of distress noted. Denies any pain. Call light within reach. Will continue to monitor.

## 2022-08-21 NOTE — PROGRESS NOTES
Hospitalist Progress Note   Admit Date:  2022  3:17 PM   Name:  Enriqueta Palma   Age:  65 y.o. Sex:  female  :  1960   MRN:  [id number]   Room:  2/    Presenting Complaint: Nausea     Reason(s) for Admission: Dehydration [E86.0]  Hypokalemia [E87.6]  JULIETH (acute kidney injury) ([facility] [address] [N17.9]  Diarrhea, unspecified type [R19.7]  Acute renal failure superimposed on chronic kidney disease, unspecified CKD stage, unspecified acute renal failure type ([address] [N17.9, N18.9]     Hospital Course & Interval History:     Please refer to the admission H&P for details of presentation. In summary, Enriqueta Palma is a 65 y.o. female with medical history significant for crohns disease, HTN, CKD3, depression, HLP who was admitted for several weeks of nausea, emesis and abdominal pain. GI was consulted due to concerns for crohns flare up. Patient was started on IV steroids. Patient symptoms improved initially but worsened after transitioning to PO steroids. CT A/P on  was done due to continued abdominal pain. It showed circumferential wall thickening of colon possibly suggestive of colitis, therefore she was started on cipro and flagyl. Patient also had multiple bouts of diarrhea but was unable to be verified by staff. Stool studies including C. difficile has been negative. Patient continues to have abdominal pain frequent episodes of diarrhea/BM although stool amount has decreased. IV steroids has been switched to p.o. steroids improvement in her symptoms. Underwent colonoscopy on  which showed severe Crohn colitis characterized by diffuse erythema, edema, deep ulceration obliteration of vascular pattern and cobblestoning beginning the hepatic flexure and extending into and involving the rectum. Surgery has been consulted for resection. Subjective/24 hr Events (22) : Patient is seen and examined at bedside. No acute events reported overnight by nursing staff.   Reports abdominal pain has been controlled with p.o. pain medication. Had 1 episode of bowel movement overnight. Has been on IV steroids and seems to have been controlling abdominal pain and bowel movements. Diet has been advanced to regular today. Per GI and Surgery, due to her extensive disease, will need colorectal specialist.  Patient denies fever, chills, chest pains, shortness of breath, n/v.    Review of Systems: 10 point review of systems is otherwise negative with the exception of the elements mentioned above.'        Assessment & Plan:     Crohn's disease with flare  Severe crohn's disease. Stool studies have been neg including c.diff. CT A/P on 8/13 with findings suggestive of colitis. Colonoscopy on 8/19 which showed severe Crohn colitis characterized by diffuse erythema, edema, deep ulceration obliteration of vascular pattern and cobblestoning beginning the hepatic flexure and extending into and involving the rectum. 08/21/22: symptoms appeared to be stable with po pain meds and IV steroids, however, she will need Colorectal Specialist given her extensive disease   -GI recommendations appreciated  -General Surgery recommending Colorectal specialist. Delma Black of GI to message Freda Rodriguez.   -continue on IV steroids  -TPN to be weaned off today. Now advanced to regular diet    Hyperglycemia  08/21/22: FS continues to he elevated. A1c of 6.1  - increase lantus to 6U qAM and titirate  - insulin sliding scale  - monitor FS 3 times daily prior to meals and before bedtime. JULIETH on CKD3  Creatinine peaked at 3.80 on 8/8 08/21/22: Cr stable at 1.00.  - encouraged PO intake.   - monitor renal function    HTN  // HLD  08/21/22:  BP stable off anti-HTN meds  -Continue to hold and restart as needed  -Continue with home statin    Depression: continue with home wellbutrin and zoloft  RLS: continue with requip    Moderate Protein Calorie Malnutrition  - nutrition on board    Anemia, iron deficiency  - now on PO iron    Discharge Planning:  pending Surgical eval for colectomy given severe crohn's disease    Diet:  PN-Adult Premix 4.25/5 - Peripheral Line  ADULT DIET; Regular; GI [place] (GERD/Peptic Ulcer); Low Fiber; No red dye  ADULT ORAL NUTRITION SUPPLEMENT; Breakfast, Dinner, Lunch; Standard High Calorie/High Protein Oral Supplement  DVT PPx: SCDs  Code status: Full Code      I have personally reviewed and ordered clinical lab tests and independently visualized images, tracing. I spent 35 minutes of time caring for this patient at bedside or nearby, and more than 50 percent of which was spent on coordination of care and/or patient/family counseling regarding the disease process, status , and treatment options/plan of care.         Hospital Problems:  Principal Problem:    JULIETH (acute kidney injury) ([facility] [address]  Active Problems:    Crohn's disease ([facility] [address]    Hypertension    Hyperlipidemia    Depression    Moderate protein-calorie malnutrition ([facility] [address]    Iron deficiency anemia due to chronic blood loss    Hyperglycemia    RLS (restless legs syndrome)    CKD (chronic kidney disease) stage 3, GFR 30-59 ml/min ([facility])    Colitis    Anemia    Essential hypertension, benign  Resolved Problems:    Hyponatremia    Hyperkalemia    Acute hypokalemia      Objective:   Patient Vitals for the past 24 hrs:   Temp Pulse Resp BP SpO2   08/21/22 0736 97.9 °F (36.6 °C) 79 19 132/89 96 %   08/21/22 0559 -- -- 20 -- --   08/21/22 0529 -- -- 20 -- --   08/21/22 0419 98.4 °F (36.9 °C) 80 18 136/72 98 %   08/20/22 2337 98.2 °F (36.8 °C) 77 18 134/74 96 %   08/20/22 2127 -- -- 16 -- --   08/20/22 2057 -- -- 20 -- --   08/20/22 2018 97.8 °F (36.6 °C) 90 16 126/71 99 %   08/20/22 1613 98.1 °F (36.7 °C) 84 -- (!) 141/81 99 %   08/20/22 1521 -- -- 16 -- --   08/20/22 1127 99 °F (37.2 °C) 88 16 134/85 97 %       Oxygen Therapy  SpO2: 96 %  Pulse via Oximetry: 87 beats per minute  Pulse Oximeter Device Mode: Continuous  Pulse Oximeter Device Location: Right, 100 mg/dL    BUN 27 (H) 8 - 23 MG/DL    Creatinine 1.00 0.6 - 1.0 MG/DL    GFR African American >60 >60 ml/min/1.73m2    GFR Non- 60 (L) >60 ml/min/1.73m2    Calcium 7.4 (L) 8.3 - 10.4 MG/DL   Magnesium    Collection Time: 08/20/22  5:02 AM   Result Value Ref Range    Magnesium 2.1 1.8 - 2.4 mg/dL   Albumin    Collection Time: 08/20/22  5:02 AM   Result Value Ref Range    Albumin 1.9 (L) 3.2 - 4.6 g/dL   POCT Glucose    Collection Time: 08/20/22  7:38 AM   Result Value Ref Range    POC Glucose 261 (H) 65 - 100 mg/dL    Performed by: Yadira    POCT Glucose    Collection Time: 08/20/22 11:29 AM   Result Value Ref Range    POC Glucose 238 (H) 65 - 100 mg/dL    Performed by: Lucy    POCT Glucose    Collection Time: 08/20/22  4:15 PM   Result Value Ref Range    POC Glucose 272 (H) 65 - 100 mg/dL    Performed by: Halima    POCT Glucose    Collection Time: 08/20/22  8:47 PM   Result Value Ref Range    POC Glucose 198 (H) 65 - 100 mg/dL    Performed by: Stefany    Basic Metabolic Panel    Collection Time: 08/21/22  5:18 AM   Result Value Ref Range    Sodium 137 136 - 145 mmol/L    Potassium 3.7 3.5 - 5.1 mmol/L    Chloride 105 98 - 107 mmol/L    CO2 25 21 - 32 mmol/L    Anion Gap 7 7 - 16 mmol/L    Glucose 241 (H) 65 - 100 mg/dL    BUN 30 (H) 8 - 23 MG/DL    Creatinine 1.00 0.6 - 1.0 MG/DL    GFR African American >60 >60 ml/min/1.73m2    GFR Non- 60 (L) >60 ml/min/1.73m2    Calcium 7.6 (L) 8.3 - 10.4 MG/DL   POCT Glucose    Collection Time: 08/21/22  8:25 AM   Result Value Ref Range    POC Glucose 285 (H) 65 - 100 mg/dL    Performed by: WinninghamOliviaPCT        I have personally reviewed imaging studies showing: Other Studies:  CT ABDOMEN PELVIS W IV CONTRAST Additional Contrast? Oral   Final Result   1. Circumferential wall thickening over a long segment of colon involving the   distal transverse, descending and sigmoid colon.  The apparent thickening may be   accentuated by incomplete luminal distention. Colitis could be considered in the   setting of abdominal pain. 2. Hydropic gallbladder, no gallstones or obvious gallbladder wall thickening. CT ABDOMEN PELVIS RENAL STONE Additional Contrast? None   Final Result   1. No acute abdominal or pelvic abnormality noted on this limited examination   without the benefit of intravenous or oral contrast. Specifically, no renal or   ureteral stones present. There is no hydronephrosis or hydroureter. US RETROPERITONEAL COMPLETE   Final Result      Mild right hydronephrosis, of uncertain etiology. Otherwise, unremarkable   ultrasound of the kidneys.                 Current Meds:  Current Facility-Administered Medications   Medication Dose Route Frequency    insulin glargine (LANTUS) injection vial 6 Units  6 Units SubCUTAneous Daily    PN-Adult Premix 4.25/5 - Peripheral Line   IntraVENous Continuous TPN    methylPREDNISolone sodium (SOLU-MEDROL) injection 20 mg  20 mg IntraVENous Q8H    HYDROmorphone (DILAUDID) tablet 1 mg  1 mg Oral Q4H PRN    potassium chloride 20 mEq/50 mL IVPB (Central Line)  20 mEq IntraVENous PRN    Or    potassium chloride 10 mEq/100 mL IVPB (Peripheral Line)  10 mEq IntraVENous PRN    magnesium sulfate 2000 mg in 50 mL IVPB premix  2,000 mg IntraVENous PRN    sodium phosphate 10 mmol in sodium chloride 0.9 % 250 mL IVPB  10 mmol IntraVENous PRN    Or    sodium phosphate 15 mmol in sodium chloride 0.9 % 250 mL IVPB  15 mmol IntraVENous PRN    Or    sodium phosphate 20 mmol in sodium chloride 0.9 % 500 mL IVPB  20 mmol IntraVENous PRN    diatrizoate meglumine-sodium (GASTROGRAFIN) 66-10 % solution 15 mL  15 mL Oral ONCE PRN    [Held by provider] ferrous sulfate (IRON 325) tablet 325 mg  325 mg Oral BID WC    melatonin tablet 9 mg  9 mg Oral Nightly    traMADol (ULTRAM) tablet 50 mg  50 mg Oral Q6H PRN    Or    traMADol (ULTRAM) tablet 100 mg  100 mg Oral Q6H PRN insulin lispro (HUMALOG) injection vial 0-4 Units  0-4 Units SubCUTAneous TID WC    insulin lispro (HUMALOG) injection vial 0-4 Units  0-4 Units SubCUTAneous Nightly    glucose chewable tablet 16 g  4 tablet Oral PRN    dextrose bolus 10% 125 mL  125 mL IntraVENous PRN    Or    dextrose bolus 10% 250 mL  250 mL IntraVENous PRN    glucagon (rDNA) injection 1 mg  1 mg SubCUTAneous PRN    dextrose 10 % infusion   IntraVENous Continuous PRN    hyoscyamine (LEVSIN/SL) sublingual tablet 125 mcg  125 mcg SubLINGual TID    [Held by provider] diphenoxylate-atropine (LOMOTIL) 2.5-0.025 MG per tablet 1 tablet  1 tablet Oral 4x Daily    buPROPion (WELLBUTRIN SR) extended release tablet 150 mg  150 mg Oral QAM    pantoprazole (PROTONIX) tablet 40 mg  40 mg Oral QAM AC    pravastatin (PRAVACHOL) tablet 80 mg  80 mg Oral Nightly    sertraline (ZOLOFT) tablet 100 mg  100 mg Oral Daily    sodium chloride flush 0.9 % injection 5-40 mL  5-40 mL IntraVENous 2 times per day    sodium chloride flush 0.9 % injection 5-40 mL  5-40 mL IntraVENous PRN    0.9 % sodium chloride infusion   IntraVENous PRN    ondansetron (ZOFRAN-ODT) disintegrating tablet 4 mg  4 mg Oral Q8H PRN    Or    ondansetron (ZOFRAN) injection 4 mg  4 mg IntraVENous Q6H PRN    polyethylene glycol (GLYCOLAX) packet 17 g  17 g Oral Daily PRN    acetaminophen (TYLENOL) tablet 650 mg  650 mg Oral Q6H PRN    Or    acetaminophen (TYLENOL) suppository 650 mg  650 mg Rectal Q6H PRN    nystatin (MYCOSTATIN) 398439 UNIT/ML suspension 500,000 Units  5 mL Oral 4x Daily    morphine injection 2 mg  2 mg IntraVENous Q4H PRN    rOPINIRole (REQUIP) tablet 0.5 mg  0.5 mg Oral Nightly       Signed:  Sandra Sierra MD    Part of this note may have been written by using a voice dictation software. The note has been proof read but may still contain some grammatical/other typographical errors.

## 2022-08-22 LAB
ANION GAP SERPL CALC-SCNC: 6 MMOL/L (ref 7–16)
BUN SERPL-MCNC: 33 MG/DL (ref 8–23)
CALCIUM SERPL-MCNC: 7.7 MG/DL (ref 8.3–10.4)
CHLORIDE SERPL-SCNC: 105 MMOL/L (ref 98–107)
CO2 SERPL-SCNC: 26 MMOL/L (ref 21–32)
CREAT SERPL-MCNC: 1 MG/DL (ref 0.6–1)
GLUCOSE BLD STRIP.AUTO-MCNC: 166 MG/DL (ref 65–100)
GLUCOSE BLD STRIP.AUTO-MCNC: 210 MG/DL (ref 65–100)
GLUCOSE BLD STRIP.AUTO-MCNC: 236 MG/DL (ref 65–100)
GLUCOSE BLD STRIP.AUTO-MCNC: 279 MG/DL (ref 65–100)
GLUCOSE SERPL-MCNC: 151 MG/DL (ref 65–100)
POTASSIUM SERPL-SCNC: 4 MMOL/L (ref 3.5–5.1)
SERVICE CMNT-IMP: ABNORMAL
SODIUM SERPL-SCNC: 137 MMOL/L (ref 136–145)

## 2022-08-22 PROCEDURE — 6370000000 HC RX 637 (ALT 250 FOR IP): Performed by: INTERNAL MEDICINE

## 2022-08-22 PROCEDURE — 80048 BASIC METABOLIC PNL TOTAL CA: CPT

## 2022-08-22 PROCEDURE — 36591 DRAW BLOOD OFF VENOUS DEVICE: CPT

## 2022-08-22 PROCEDURE — 6360000002 HC RX W HCPCS: Performed by: STUDENT IN AN ORGANIZED HEALTH CARE EDUCATION/TRAINING PROGRAM

## 2022-08-22 PROCEDURE — 82962 GLUCOSE BLOOD TEST: CPT

## 2022-08-22 PROCEDURE — 99222 1ST HOSP IP/OBS MODERATE 55: CPT | Performed by: SURGERY

## 2022-08-22 PROCEDURE — 6370000000 HC RX 637 (ALT 250 FOR IP): Performed by: PHYSICIAN ASSISTANT

## 2022-08-22 PROCEDURE — 6370000000 HC RX 637 (ALT 250 FOR IP): Performed by: HOSPITALIST

## 2022-08-22 PROCEDURE — 2580000003 HC RX 258: Performed by: INTERNAL MEDICINE

## 2022-08-22 PROCEDURE — 94760 N-INVAS EAR/PLS OXIMETRY 1: CPT

## 2022-08-22 PROCEDURE — 1100000000 HC RM PRIVATE

## 2022-08-22 RX ORDER — INSULIN GLARGINE 100 [IU]/ML
9 INJECTION, SOLUTION SUBCUTANEOUS DAILY
Status: DISCONTINUED | OUTPATIENT
Start: 2022-08-23 | End: 2022-08-24

## 2022-08-22 RX ADMIN — HYDROMORPHONE HYDROCHLORIDE 1 MG: 2 TABLET ORAL at 04:32

## 2022-08-22 RX ADMIN — METHYLPREDNISOLONE SODIUM SUCCINATE 20 MG: 40 INJECTION, POWDER, FOR SOLUTION INTRAMUSCULAR; INTRAVENOUS at 12:02

## 2022-08-22 RX ADMIN — NYSTATIN 500000 UNITS: 100000 SUSPENSION ORAL at 08:24

## 2022-08-22 RX ADMIN — PRAVASTATIN SODIUM 80 MG: 80 TABLET ORAL at 20:20

## 2022-08-22 RX ADMIN — HYDROMORPHONE HYDROCHLORIDE 1 MG: 2 TABLET ORAL at 09:40

## 2022-08-22 RX ADMIN — SERTRALINE HYDROCHLORIDE 100 MG: 25 TABLET ORAL at 08:24

## 2022-08-22 RX ADMIN — NYSTATIN 500000 UNITS: 100000 SUSPENSION ORAL at 12:03

## 2022-08-22 RX ADMIN — SODIUM CHLORIDE, PRESERVATIVE FREE 10 ML: 5 INJECTION INTRAVENOUS at 08:27

## 2022-08-22 RX ADMIN — HYOSCYAMINE SULFATE 125 MCG: 0.12 TABLET ORAL at 20:22

## 2022-08-22 RX ADMIN — INSULIN LISPRO 2 UNITS: 100 INJECTION, SOLUTION INTRAVENOUS; SUBCUTANEOUS at 16:38

## 2022-08-22 RX ADMIN — ROPINIROLE HYDROCHLORIDE 0.5 MG: 0.5 TABLET, FILM COATED ORAL at 20:23

## 2022-08-22 RX ADMIN — BUPROPION HYDROCHLORIDE 150 MG: 150 TABLET, EXTENDED RELEASE ORAL at 08:24

## 2022-08-22 RX ADMIN — SODIUM CHLORIDE, PRESERVATIVE FREE 10 ML: 5 INJECTION INTRAVENOUS at 20:37

## 2022-08-22 RX ADMIN — HYDROMORPHONE HYDROCHLORIDE 1 MG: 2 TABLET ORAL at 15:40

## 2022-08-22 RX ADMIN — NYSTATIN 500000 UNITS: 100000 SUSPENSION ORAL at 20:22

## 2022-08-22 RX ADMIN — NYSTATIN 500000 UNITS: 100000 SUSPENSION ORAL at 16:39

## 2022-08-22 RX ADMIN — HYOSCYAMINE SULFATE 125 MCG: 0.12 TABLET ORAL at 08:24

## 2022-08-22 RX ADMIN — HYOSCYAMINE SULFATE 125 MCG: 0.12 TABLET ORAL at 15:36

## 2022-08-22 RX ADMIN — INSULIN GLARGINE 6 UNITS: 100 INJECTION, SOLUTION SUBCUTANEOUS at 08:20

## 2022-08-22 RX ADMIN — INSULIN LISPRO 1 UNITS: 100 INJECTION, SOLUTION INTRAVENOUS; SUBCUTANEOUS at 12:02

## 2022-08-22 RX ADMIN — Medication 9 MG: at 20:21

## 2022-08-22 RX ADMIN — HYDROMORPHONE HYDROCHLORIDE 1 MG: 2 TABLET ORAL at 20:22

## 2022-08-22 RX ADMIN — METHYLPREDNISOLONE SODIUM SUCCINATE 20 MG: 40 INJECTION, POWDER, FOR SOLUTION INTRAMUSCULAR; INTRAVENOUS at 04:32

## 2022-08-22 RX ADMIN — PANTOPRAZOLE SODIUM 40 MG: 40 TABLET, DELAYED RELEASE ORAL at 06:05

## 2022-08-22 RX ADMIN — METHYLPREDNISOLONE SODIUM SUCCINATE 20 MG: 40 INJECTION, POWDER, FOR SOLUTION INTRAMUSCULAR; INTRAVENOUS at 20:31

## 2022-08-22 ASSESSMENT — PAIN SCALES - GENERAL
PAINLEVEL_OUTOF10: 6
PAINLEVEL_OUTOF10: 0
PAINLEVEL_OUTOF10: 6
PAINLEVEL_OUTOF10: 5
PAINLEVEL_OUTOF10: 9
PAINLEVEL_OUTOF10: 8

## 2022-08-22 ASSESSMENT — PAIN DESCRIPTION - LOCATION
LOCATION: ABDOMEN
LOCATION: GENERALIZED;ABDOMEN
LOCATION: ABDOMEN
LOCATION: ABDOMEN

## 2022-08-22 ASSESSMENT — PAIN - FUNCTIONAL ASSESSMENT
PAIN_FUNCTIONAL_ASSESSMENT: ACTIVITIES ARE NOT PREVENTED

## 2022-08-22 ASSESSMENT — PAIN DESCRIPTION - ORIENTATION
ORIENTATION: OTHER (COMMENT)
ORIENTATION: UPPER;LOWER
ORIENTATION: UPPER
ORIENTATION: OTHER (COMMENT)

## 2022-08-22 ASSESSMENT — PAIN DESCRIPTION - DESCRIPTORS
DESCRIPTORS: ACHING;DISCOMFORT
DESCRIPTORS: ACHING;DISCOMFORT
DESCRIPTORS: ACHING;CRAMPING;DISCOMFORT

## 2022-08-22 NOTE — PROGRESS NOTES
Consult Note  Meena Parham  MRN: [id number]  :1960  Age:62 y.o.    HPI: Meena Parham is a 65 y.o. female General Surgery was asked to see in consultation by Dr. Elías Ureña (GI) to evaluate for possible surgical resection of colon in the setting of severe refractory Crohn's colitis extending from hepatic flexure through rectum with perianal disease. Pt has failed multiple modalities for Crohn's treatment and symptoms refractory to IV steroids. The patient has a PMHx of Crohn's disease (large and small bowel), CKD3, depression, DJD, GERD, HTN, HLP, obesity, OA, and steroid induced DM. Pt initially presented to the ED 22 with a reported 3-4 week hx of generalized abdominal cramping/pain with constant diarrhea (improved with Lomotil at home), nausea and 40 lb weight loss in 6 months. Pt also c/o severe rectal spasms but denied bleeding. She reports having watery foul smelling stool every 30-60min, increased weakness, and dehydration. Pt's IBD has been essentially uncontrolled with Remicade, Humira, Entyvio, stelara and she is currently on Vichy Awilda. Pt reports she ran out of Vichy Awilda for 4-5 days prior to ED presentation. She reports she has responded intermittently to budesonide and prednisone. Bentyl has been ineffective but she is unsure about Levsin. Pt was seen by Dr. Franny Rowan (GI) in late July with similar complaints. Labs showed normal CBC, LFTs, and TSH. She had elevated Cr at 1.7, elevated CRP and calprotectin 1296 with neg C diff 22. CT A/P with findings cw IBD. On admission, has JULIETH with Cr 3.8, K 2.8, Na 127, HGB 10.. She was admitted on 2022 for JULIETH.      Colonoscopy 22 12:15  FINDINGS:  Significant perianal disease with apparent old fistulous tracks as well as anal skin tags and external hemorrhoids were noted on digital rectal exam. Severe Crohn's colitis characterized by diffuse erythema, edema, deep ulcerations, obliteration of the vascular pattern, and cobblestoning was noted beginning in the hepatic flexure and extending to and involving the rectum. The terminal ileum, cecum, and ascending colon did not appear to be involved. Biopsies were obtained from the terminal ileum, right colon, transverse colon, left colon. POST-OP DIAGNOSIS:  Severe Crohn's colitis extending from hepatic flexure through rectum with perianal disease  Right side and terminal ileum do not appear to be involved  Other related Crohn's workup:  EGD 3/23/20 : normal  Colonoscopy 3/23/20: IH, colon polyp, crohn's ileitis and colitis. Cdiff negative 8/2/22. Fecal calprotectin 1296. . TSH normal. LFTs normal.  CT A/P 8/5/22: diffusely thickened transverse, descending, and sigmoid colon  cw with IBD, slightly prominent adrenal glands, small renal and hepatic cysts. Previous abdominal surgeries: cholecystectomy 2009  Pt is not taking anticoagulation  Pt with clear liquid diet at time of consult. Currently receiving TPN     8/22/2022 Comfortable in bed. Clarified recommendation for discussion with Dr. Randi Rincon for complex IBD expertise and current relationship with him as well as in past.  Responding with symptom improvement with IV steroids. Past Medical History:   Diagnosis Date    Depression     managed with medication     Disc prolapse     L4 to L5    DJD (degenerative joint disease)     GERD (gastroesophageal reflux disease)     managed with medication     Hypertension     managed with medication     Obesity (BMI 30-39. 9)     BMI 31.5    Osteoarthritis     Steroid-induced diabetes mellitus ([address] 6/5/2013    Steroid-induced diabetes mellitus ([address] 6/5/2013    no present medication needed     Ulcerative colitis ([address]     managed with medication      Past Surgical History:   Procedure Laterality Date    COLONOSCOPY  2009    showed UC    COLONOSCOPY N/A 8/19/2022    COLONOSCOPY WITH BIOPSY performed by Deborah Haywood MD at [address] N/A 5/11/2016    SIGMOIDOSCOPY FLEXIBLE/   BMI 36 performed by Jolie Suarez MD at [place] [id number]    left mastoidectomy    HEENT  1971    tympanoplasty    INNER EAR SURGERY PROC UNLISTED  94,48,71    mastoid cyst +TM repair-hearing loss, left ear    TONSILLECTOMY  1971         VASCULAR SURGERY  06/5/2013 [place]    port placement    VASCULAR SURGERY  2013    port placement for remicade     Current Facility-Administered Medications   Medication Dose Route Frequency    [START ON 8/23/2022] insulin glargine (LANTUS) injection vial 9 Units  9 Units SubCUTAneous Daily    methylPREDNISolone sodium (SOLU-MEDROL) injection 20 mg  20 mg IntraVENous Q8H    HYDROmorphone (DILAUDID) tablet 1 mg  1 mg Oral Q4H PRN    potassium chloride 20 mEq/50 mL IVPB (Central Line)  20 mEq IntraVENous PRN    Or    potassium chloride 10 mEq/100 mL IVPB (Peripheral Line)  10 mEq IntraVENous PRN    magnesium sulfate 2000 mg in 50 mL IVPB premix  2,000 mg IntraVENous PRN    sodium phosphate 10 mmol in sodium chloride 0.9 % 250 mL IVPB  10 mmol IntraVENous PRN    Or    sodium phosphate 15 mmol in sodium chloride 0.9 % 250 mL IVPB  15 mmol IntraVENous PRN    Or    sodium phosphate 20 mmol in sodium chloride 0.9 % 500 mL IVPB  20 mmol IntraVENous PRN    diatrizoate meglumine-sodium (GASTROGRAFIN) 66-10 % solution 15 mL  15 mL Oral ONCE PRN    [Held by provider] ferrous sulfate (IRON 325) tablet 325 mg  325 mg Oral BID WC    melatonin tablet 9 mg  9 mg Oral Nightly    traMADol (ULTRAM) tablet 50 mg  50 mg Oral Q6H PRN    Or    traMADol (ULTRAM) tablet 100 mg  100 mg Oral Q6H PRN    insulin lispro (HUMALOG) injection vial 0-4 Units  0-4 Units SubCUTAneous TID WC    insulin lispro (HUMALOG) injection vial 0-4 Units  0-4 Units SubCUTAneous Nightly    glucose chewable tablet 16 g  4 tablet Oral PRN    dextrose bolus 10% 125 mL  125 mL IntraVENous PRN    Or    dextrose bolus 10% 250 mL  250 mL IntraVENous PRN    glucagon (rDNA) injection 1 mg  1 mg SubCUTAneous PRN dextrose 10 % infusion   IntraVENous Continuous PRN    hyoscyamine (LEVSIN/SL) sublingual tablet 125 mcg  125 mcg SubLINGual TID    [Held by provider] diphenoxylate-atropine (LOMOTIL) 2.5-0.025 MG per tablet 1 tablet  1 tablet Oral 4x Daily    buPROPion (WELLBUTRIN SR) extended release tablet 150 mg  150 mg Oral QAM    pantoprazole (PROTONIX) tablet 40 mg  40 mg Oral QAM AC    pravastatin (PRAVACHOL) tablet 80 mg  80 mg Oral Nightly    sertraline (ZOLOFT) tablet 100 mg  100 mg Oral Daily    sodium chloride flush 0.9 % injection 5-40 mL  5-40 mL IntraVENous 2 times per day    sodium chloride flush 0.9 % injection 5-40 mL  5-40 mL IntraVENous PRN    0.9 % sodium chloride infusion   IntraVENous PRN    ondansetron (ZOFRAN-ODT) disintegrating tablet 4 mg  4 mg Oral Q8H PRN    Or    ondansetron (ZOFRAN) injection 4 mg  4 mg IntraVENous Q6H PRN    polyethylene glycol (GLYCOLAX) packet 17 g  17 g Oral Daily PRN    acetaminophen (TYLENOL) tablet 650 mg  650 mg Oral Q6H PRN    Or    acetaminophen (TYLENOL) suppository 650 mg  650 mg Rectal Q6H PRN    nystatin (MYCOSTATIN) [id number] UNIT/ML suspension 500,000 Units  5 mL Oral 4x Daily    morphine injection 2 mg  2 mg IntraVENous Q4H PRN    rOPINIRole (REQUIP) tablet 0.5 mg  0.5 mg Oral Nightly     Codeine  Social History     Socioeconomic History    Marital status:    Tobacco Use    Smoking status: Every Day     Packs/day: 1.00     Types: Cigarettes    Smokeless tobacco: Never   Substance and Sexual Activity    Alcohol use: No    Drug use: No   Social History Narrative    PATIENT IS , NOT WORKING OUTSIDE OF HOME. NO CHILDREN. PATIENT IS AN ONLY CHILD.      Social History     Tobacco Use   Smoking Status Every Day    Packs/day: 1.00    Types: Cigarettes   Smokeless Tobacco Never     Family History   Problem Relation Age of Onset    Osteoarthritis Brother     Heart Disease Father     Hypertension Father     Breast Cancer Neg Hx     Diabetes Mother         type I ROS: The patient has no difficulty with chest pain or shortness of breath. No fever or chills. Comprehensive review of systems was otherwise unremarkable except as noted above. Physical Exam:   /76   Pulse 80   Temp 98.1 °F (36.7 °C)   Resp 17   Ht 5' 3\" (1.6 m)   Wt 145 lb 11.6 oz (66.1 kg)   SpO2 96%   BMI 25.81 kg/m²   Constitutional: Alert, oriented, cooperative patient in no acute distress; appears stated age    Eyes: Sclera are clear. EOMs intact  ENMT: no external lesions gross hearing normal; no obvious neck masses, no ear or lip lesions, nares normal  CV: RRR. Normal perfusion  Resp: No JVD. Breathing is  non-labored; no audible wheezing. GI: soft and non-distended, non-tender  Musculoskeletal: unremarkable with normal function. No embolic signs or cyanosis.    Neuro:  Oriented; moves all 4; no focal deficits  Psychiatric: normal affect and mood, no memory impairment    Recent vitals (if inpt):  Patient Vitals for the past 24 hrs:   BP Temp Temp src Pulse Resp SpO2   08/22/22 1549 139/76 98.1 °F (36.7 °C) -- 80 17 96 %   08/22/22 1540 -- -- -- -- 16 --   08/22/22 1016 134/76 97.9 °F (36.6 °C) -- 80 17 98 %   08/22/22 0940 -- -- -- -- 16 --   08/22/22 0737 135/76 97.9 °F (36.6 °C) -- 73 17 98 %   08/22/22 0432 -- -- -- -- 17 --   08/22/22 0340 136/75 97.9 °F (36.6 °C) -- 81 18 98 %   08/21/22 2339 127/68 97.7 °F (36.5 °C) Oral 73 18 97 %   08/21/22 2206 -- -- -- -- 17 --   08/21/22 2136 -- -- -- -- 17 --   08/21/22 2009 121/66 98.4 °F (36.9 °C) Oral 83 18 97 %       Labs:  Recent Labs     08/22/22  0414      K 4.0      CO2 26   BUN 33*       Lab Results   Component Value Date/Time    WBC 8.2 08/19/2022 04:36 AM    HGB 7.8 08/19/2022 04:36 AM     08/19/2022 04:36 AM     08/22/2022 04:14 AM    K 4.0 08/22/2022 04:14 AM     08/22/2022 04:14 AM    CO2 26 08/22/2022 04:14 AM    BUN 33 08/22/2022 04:14 AM    ALT 12 08/09/2022 04:26 AM     CT OF THE ABDOMEN AND PELVIS WITH CONTRAST. 8/13/22 16:57       CLINICAL INDICATION: Abdominal pain       PROCEDURE: Serial thin section axial images obtained from the lung bases through   the proximal femurs following the administration of 100 cc of Isovue 370   intravenous contrast.  Radiation dose reduction techniques were used for this   study. Our CT scanners use one or all of the following: Automated exposure   control, adjusted of the mA and/or kV according to patient size, iterative   reconstruction       COMPARISON: CT abdomen pelvis without contrast dated 8/10/2022       FINDINGS:        Trace bilateral pleural effusions are present. CT ABDOMEN: The gallbladder is hydropic but maintains thin walls. No gallstones   noted. The stomach is incompletely distended. The spleen is normal. The kidneys   are unremarkable except for a tiny simple right renal cyst. There is no   hydronephrosis. The adrenal glands are normal. The appendix is normal. There is   mild long segment wall thickening along the distal transverse, descending and   sigmoid colon. This may be related to incomplete bowel distention. Colitis is   not excluded. CT PELVIS: The bladder is well-distended with smooth thin walls. The rectum is   normal. The uterus and adnexa are unremarkable. No free fluid accumulating in   the pelvis. There is no inguinal hernia or adenopathy. No aggressive osseous is identified. Impression   1. Circumferential wall thickening over a long segment of colon involving the   distal transverse, descending and sigmoid colon. The apparent thickening may be   accentuated by incomplete luminal distention. Colitis could be considered in the   setting of abdominal pain. 2. Hydropic gallbladder, no gallstones or obvious gallbladder wall thickening. I reviewed recent labs and recent radiologic studies.         ASSESSMENT/PLAN:    Principal Problem:    JULIETH (acute kidney injury) ([address]  Active Problems:    Crohn's

## 2022-08-22 NOTE — PROGRESS NOTES
Patient awake resting in bed. Respirations present. On room air. No signs of distress. AxO x4. No needs expressed. Bed low and locked. Call light within reach. Preparing to give bedside report to oncoming RN.

## 2022-08-22 NOTE — PROGRESS NOTES
Patient resting in bed at this time. Alert and oriented x 4. Respirations present and unlabored on room air. Call light within reach. No signs of distress at this time. Preparing to give report to oncoming RN.

## 2022-08-22 NOTE — PROGRESS NOTES
[facility] Progress Note         Admit Date:  8/8/2022    Today's Date:  8/22/2022    CC:  Crohn's disease, abdominal pain, diarrhea    Subjective:     Patient reports improvement in generalized abdominal pain but this is still present, she confirms early satiety. She denies nausea, vomiting, or rectal bleeding.     Medications:   Current Facility-Administered Medications   Medication Dose Route Frequency    insulin glargine (LANTUS) injection vial 6 Units  6 Units SubCUTAneous Daily    methylPREDNISolone sodium (SOLU-MEDROL) injection 20 mg  20 mg IntraVENous Q8H    HYDROmorphone (DILAUDID) tablet 1 mg  1 mg Oral Q4H PRN    potassium chloride 20 mEq/50 mL IVPB (Central Line)  20 mEq IntraVENous PRN    Or    potassium chloride 10 mEq/100 mL IVPB (Peripheral Line)  10 mEq IntraVENous PRN    magnesium sulfate 2000 mg in 50 mL IVPB premix  2,000 mg IntraVENous PRN    sodium phosphate 10 mmol in sodium chloride 0.9 % 250 mL IVPB  10 mmol IntraVENous PRN    Or    sodium phosphate 15 mmol in sodium chloride 0.9 % 250 mL IVPB  15 mmol IntraVENous PRN    Or    sodium phosphate 20 mmol in sodium chloride 0.9 % 500 mL IVPB  20 mmol IntraVENous PRN    diatrizoate meglumine-sodium (GASTROGRAFIN) 66-10 % solution 15 mL  15 mL Oral ONCE PRN    [Held by provider] ferrous sulfate (IRON 325) tablet 325 mg  325 mg Oral BID WC    melatonin tablet 9 mg  9 mg Oral Nightly    traMADol (ULTRAM) tablet 50 mg  50 mg Oral Q6H PRN    Or    traMADol (ULTRAM) tablet 100 mg  100 mg Oral Q6H PRN    insulin lispro (HUMALOG) injection vial 0-4 Units  0-4 Units SubCUTAneous TID WC    insulin lispro (HUMALOG) injection vial 0-4 Units  0-4 Units SubCUTAneous Nightly    glucose chewable tablet 16 g  4 tablet Oral PRN    dextrose bolus 10% 125 mL  125 mL IntraVENous PRN    Or    dextrose bolus 10% 250 mL  250 mL IntraVENous PRN    glucagon (rDNA) injection 1 mg  1 mg SubCUTAneous PRN    dextrose 10 % infusion   IntraVENous Continuous PRN hyoscyamine (LEVSIN/SL) sublingual tablet 125 mcg  125 mcg SubLINGual TID    [Held by provider] diphenoxylate-atropine (LOMOTIL) 2.5-0.025 MG per tablet 1 tablet  1 tablet Oral 4x Daily    buPROPion [facility] SR) extended release tablet 150 mg  150 mg Oral QAM    pantoprazole (PROTONIX) tablet 40 mg  40 mg Oral QAM AC    pravastatin (PRAVACHOL) tablet 80 mg  80 mg Oral Nightly    sertraline (ZOLOFT) tablet 100 mg  100 mg Oral Daily    sodium chloride flush 0.9 % injection 5-40 mL  5-40 mL IntraVENous 2 times per day    sodium chloride flush 0.9 % injection 5-40 mL  5-40 mL IntraVENous PRN    0.9 % sodium chloride infusion   IntraVENous PRN    ondansetron (ZOFRAN-ODT) disintegrating tablet 4 mg  4 mg Oral Q8H PRN    Or    ondansetron (ZOFRAN) injection 4 mg  4 mg IntraVENous Q6H PRN    polyethylene glycol (GLYCOLAX) packet 17 g  17 g Oral Daily PRN    acetaminophen (TYLENOL) tablet 650 mg  650 mg Oral Q6H PRN    Or    acetaminophen (TYLENOL) suppository 650 mg  650 mg Rectal Q6H PRN    nystatin (MYCOSTATIN) 250901 UNIT/ML suspension 500,000 Units  5 mL Oral 4x Daily    morphine injection 2 mg  2 mg IntraVENous Q4H PRN    rOPINIRole (REQUIP) tablet 0.5 mg  0.5 mg Oral Nightly       Review of Systems:  ROS was obtained, with pertinent positives as listed above. No chest pain or SOB. Diet:  Regular, GI bland, Low fiber     Objective:   Vitals:  /76   Pulse 80   Temp 97.9 °F (36.6 °C)   Resp 17   Ht 5' 3\" (1.6 m)   Wt 145 lb 11.6 oz (66.1 kg)   SpO2 98%   BMI 25.81 kg/m²   Intake/Output:  No intake/output data recorded. 08/20 1901 - 08/22 0700  In: 1000 [P.O.:1000]  Out: -   Exam:  General appearance: alert, cooperative, no distress, lying in bed, left chest PAC  Lungs: clear to auscultation bilaterally anteriorly  Heart: regular rate and rhythm  Abdomen: soft, mild diffuse TTP.  Bowel sounds normal. No masses, no organomegaly  Extremities: extremities normal, atraumatic, no cyanosis or edema  Neuro:  alert and oriented    Data Review (Labs):    Recent Labs     08/20/22  0502 08/21/22  0518 08/22/22  0414    137 137   K 3.5 3.7 4.0    105 105   CO2 25 25 26   BUN 27* 30* 33*   MG 2.1  --   --        Assessment:     Principal Problem:    JULIETH (acute kidney injury) ([facility] [address]  Active Problems:    Crohn's disease (HCC)    Hypertension    Hyperlipidemia    Depression    Moderate protein-calorie malnutrition (HCC)    Iron deficiency anemia due to chronic blood loss    Hyperglycemia    RLS (restless legs syndrome)    CKD (chronic kidney disease) stage 3, GFR 30-59 ml/min (HCC)    Colitis    Anemia    Essential hypertension, benign  Resolved Problems:    Hyponatremia    Hyperkalemia    Acute hypokalemia    65 y.o. female with PMH including but not limited to crohns disease (lg and sm bowel), HTN, CKD3, depression, HLP, RLS, who we are following for Crohn's flare with abdominal cramping, nausea, rectal spasm, non-bloody diarrhea, dehydration, and wt loss. She has failed Remicade, Humira, Entyvio, Stelara. Currenlty on Alois Juan but not in remission. Recent Cdiff neg, Calprotectin >1200 and CT evidence of bowel thickening cw CD. Has electrolyte imbalance and JULIETH on admission. 8/19/22:  Colonoscopy with severe Crohn's colitis from hepatic flexure through rectum with perianal involvement, right side and TI was normal.  Surgery consulted. Plan:     - F/u pathology  - She has severe disease that has been refractory to multiple modalities of treatment along with IV steroids  - Continue IV Solumedrol  - Surgery recommended eval by dedicated CRS (Dr. Emmanuelle Rodriguez or [facility])  - Dr. Emmanuelle Rodriguez notified by Dr. Tamara Oliveros, as he has seen her within the past year  - Dr. Emmanuelle Rodriguez will discuss further with Dr. Tamara Oliveros   -May consider transfer to [facility] for CRS eval by them if no response.   - Following      LAURA Pal  [facility]     Patient is seen and examined in collaboration with Dr. Francisca Garrido

## 2022-08-22 NOTE — PROGRESS NOTES
Received report from [facility]. Patient awake resting in bed. Respirations present. On room air. No signs of distress. AxO x4. No needs expressed. Bed low and locked. Call light within reach. Will continue to monitor.

## 2022-08-22 NOTE — PROGRESS NOTES
Patient in bed resting. Alert and oriented x 4. Respirations present and unlabored on room air. Pt denies any needs at this time. No signs of distress.  Report received from Prudencio Hickman

## 2022-08-22 NOTE — PROGRESS NOTES
Hospitalist Progress Note   Admit Date:  2022  3:17 PM   Name:  Polina Payne   Age:  65 y.o. Sex:  female  :  1960   MRN:  [id number]   Room:  2/    Presenting Complaint: Nausea     Reason(s) for Admission: Dehydration [E86.0]  Hypokalemia [E87.6]  JULIETH (acute kidney injury) ([facility] [address] [N17.9]  Diarrhea, unspecified type [R19.7]  Acute renal failure superimposed on chronic kidney disease, unspecified CKD stage, unspecified acute renal failure type ([address] [N17.9, N18.9]     Hospital Course & Interval History:     Please refer to the admission H&P for details of presentation. In summary, Polina Payne is a 65 y.o. female with medical history significant for crohns disease, HTN, CKD3, depression, HLP who was admitted for several weeks of nausea, emesis and abdominal pain. GI was consulted due to concerns for crohns flare up. Patient was started on IV steroids. Patient symptoms improved initially but worsened after transitioning to PO steroids. CT A/P on  was done due to continued abdominal pain. It showed circumferential wall thickening of colon possibly suggestive of colitis, therefore she was started on cipro and flagyl. Patient also had multiple bouts of diarrhea but was unable to be verified by staff. Stool studies including C. difficile has been negative. Patient continues to have abdominal pain frequent episodes of diarrhea/BM although stool amount has decreased. IV steroids has been switched to p.o. steroids improvement in her symptoms. Underwent colonoscopy on  which showed severe Crohn colitis characterized by diffuse erythema, edema, deep ulceration obliteration of vascular pattern and cobblestoning beginning the hepatic flexure and extending into and involving the rectum. Surgery has been consulted for resection and they are recommending colorectal specialist due to extend of the disease. Subjective/24 hr Events (22) :   Patient is seen and examined at bedside. No acute events reported overnight by nursing staff. Continues to have abdominal pain but controlled with pain regimen. Tolerated regular diet but only few bites at a time due to getting filled easily. Patient denies fever, chills, chest pains, shortness of breath, n/v.    Review of Systems: 10 point review of systems is otherwise negative with the exception of the elements mentioned above.'        Assessment & Plan:     Crohn's disease with flare  Severe crohn's disease. Stool studies have been neg including c.diff. CT A/P on 8/13 with findings suggestive of colitis. Colonoscopy on 8/19 which showed severe Crohn colitis characterized by diffuse erythema, edema, deep ulceration obliteration of vascular pattern and cobblestoning beginning the hepatic flexure and extending into and involving the rectum. 08/22/22: stable with abdominal pain on IV steroids and pain regimen. she will need Colorectal Specialist given her extensive disease   -GI recommendations appreciated.  to discuss with [facility]  -General Surgery recommending Colorectal specialist.   -continue on IV steroids  -Now advanced to regular diet    Hyperglycemia  08/22/22: FS continues to he elevated. A1c of 6.1  - increase lantus to 9U qAM and titirate  - insulin sliding scale  - monitor FS 3 times daily prior to meals and before bedtime. JULIETH on CKD3  Creatinine peaked at 3.80 on 8/8 08/22/22: Cr stable at 1.00.  - encouraged PO intake. - monitor renal function    HTN  // HLD  08/22/22:  BP stable off anti-HTN meds  -Continue to hold and restart as needed  -Continue with home statin    Depression: continue with home wellbutrin and zoloft  RLS: continue with requip    Moderate Protein Calorie Malnutrition  - nutrition on board    Anemia, iron deficiency  - now on PO iron    Discharge Planning:  pending Surgical eval for colectomy given severe crohn's disease    Diet:  ADULT DIET;  Regular; GI Maxwell (GERD/Peptic Ulcer); Low Fiber; No red dye  ADULT ORAL NUTRITION SUPPLEMENT; Breakfast, Dinner, Lunch; Standard High Calorie/High Protein Oral Supplement  DVT PPx: SCDs  Code status: Full Code      I have personally reviewed and ordered clinical lab tests and independently visualized images, tracing. Hospital Problems:  Principal Problem:    JULIETH (acute kidney injury) ([facility] [address]  Active Problems:    Crohn's disease ([facility] [address]    Hypertension    Hyperlipidemia    Depression    Moderate protein-calorie malnutrition ([facility] [address]    Iron deficiency anemia due to chronic blood loss    Hyperglycemia    RLS (restless legs syndrome)    CKD (chronic kidney disease) stage 3, GFR 30-59 ml/min ([facility])    Colitis    Anemia    Essential hypertension, benign  Resolved Problems:    Hyponatremia    Hyperkalemia    Acute hypokalemia      Objective:   Patient Vitals for the past 24 hrs:   Temp Pulse Resp BP SpO2   08/22/22 1016 97.9 °F (36.6 °C) 80 17 134/76 98 %   08/22/22 0940 -- -- 16 -- --   08/22/22 0737 97.9 °F (36.6 °C) 73 17 135/76 98 %   08/22/22 0432 -- -- 17 -- --   08/22/22 0340 97.9 °F (36.6 °C) 81 18 136/75 98 %   08/21/22 2339 97.7 °F (36.5 °C) 73 18 127/68 97 %   08/21/22 2206 -- -- 17 -- --   08/21/22 2136 -- -- 17 -- --   08/21/22 2009 98.4 °F (36.9 °C) 83 18 121/66 97 %   08/21/22 1507 98.6 °F (37 °C) 79 19 126/75 99 %   08/21/22 1436 -- -- 17 -- --       Oxygen Therapy  SpO2: 98 %  Pulse via Oximetry: 87 beats per minute  Pulse Oximeter Device Mode: Continuous  Pulse Oximeter Device Location: Right, Finger  O2 Device: None (Room air)  Skin Assessment: Clean, dry, & intact  Oxygen Therapy: None (Room air)    Estimated body mass index is 25.81 kg/m² as calculated from the following:    Height as of this encounter: 5' 3\" (1.6 m). Weight as of this encounter: 145 lb 11.6 oz (66.1 kg).     Intake/Output Summary (Last 24 hours) at 8/22/2022 1422  Last data filed at 8/22/2022 0900  Gross per 24 hour   Intake 600 ml   Output --   Net 600 ml Physical Exam:     Blood pressure 134/76, pulse 80, temperature 97.9 °F (36.6 °C), resp. rate 17, height 5' 3\" (1.6 m), weight 145 lb 11.6 oz (66.1 kg), SpO2 98 %. General:    Alert and awake. Not in apparent distress  Head:  Normocephalic, atraumatic  Eyes:  Sclerae appear normal.  Pupils equally round. ENT:  Nares appear normal, no drainage. Moist oral mucosa  Neck:  No restricted ROM. Trachea midline   CV:   RRR. No m/r/g. No jugular venous distension. Lungs:   CTAB. No wheezing. Symmetric expansion. Abdomen: Bowel sounds present. Soft, nontender, nondistended. Extremities: No cyanosis or clubbing. No edema  Skin:     No rashes and normal coloration. Warm and dry. Neuro:  CN II-XII grossly intact. A&Ox3  Psych:  Normal mood and affect.       I have personally reviewed labs and tests showing:  Recent Labs:  Recent Results (from the past 48 hour(s))   POCT Glucose    Collection Time: 08/20/22  4:15 PM   Result Value Ref Range    POC Glucose 272 (H) 65 - 100 mg/dL    Performed by: Halima    POCT Glucose    Collection Time: 08/20/22  8:47 PM   Result Value Ref Range    POC Glucose 198 (H) 65 - 100 mg/dL    Performed by: [facility]    Basic Metabolic Panel    Collection Time: 08/21/22  5:18 AM   Result Value Ref Range    Sodium 137 136 - 145 mmol/L    Potassium 3.7 3.5 - 5.1 mmol/L    Chloride 105 98 - 107 mmol/L    CO2 25 21 - 32 mmol/L    Anion Gap 7 7 - 16 mmol/L    Glucose 241 (H) 65 - 100 mg/dL    BUN 30 (H) 8 - 23 MG/DL    Creatinine 1.00 0.6 - 1.0 MG/DL    GFR African American >60 >60 ml/min/1.73m2    GFR Non- 60 (L) >60 ml/min/1.73m2    Calcium 7.6 (L) 8.3 - 10.4 MG/DL   POCT Glucose    Collection Time: 08/21/22  8:25 AM   Result Value Ref Range    POC Glucose 285 (H) 65 - 100 mg/dL    Performed by: Joseph    POCT Glucose    Collection Time: 08/21/22 11:40 AM   Result Value Ref Range    POC Glucose 275 (H) 65 - 100 mg/dL    Performed by: Joseph    POCT Glucose    Collection Time: 08/21/22  4:27 PM   Result Value Ref Range    POC Glucose 267 (H) 65 - 100 mg/dL    Performed by: IvonneaPCT    POCT Glucose    Collection Time: 08/21/22  9:12 PM   Result Value Ref Range    POC Glucose 325 (H) 65 - 100 mg/dL    Performed by: [facility]    Basic Metabolic Panel    Collection Time: 08/22/22  4:14 AM   Result Value Ref Range    Sodium 137 136 - 145 mmol/L    Potassium 4.0 3.5 - 5.1 mmol/L    Chloride 105 98 - 107 mmol/L    CO2 26 21 - 32 mmol/L    Anion Gap 6 (L) 7 - 16 mmol/L    Glucose 151 (H) 65 - 100 mg/dL    BUN 33 (H) 8 - 23 MG/DL    Creatinine 1.00 0.6 - 1.0 MG/DL    GFR African American >60 >60 ml/min/1.73m2    GFR Non- 60 (L) >60 ml/min/1.73m2    Calcium 7.7 (L) 8.3 - 10.4 MG/DL   POCT Glucose    Collection Time: 08/22/22  7:35 AM   Result Value Ref Range    POC Glucose 166 (H) 65 - 100 mg/dL    Performed by: Rocio Mena    POCT Glucose    Collection Time: 08/22/22 11:55 AM   Result Value Ref Range    POC Glucose 236 (H) 65 - 100 mg/dL    Performed by: Rocio Mena        I have personally reviewed imaging studies showing: Other Studies:  CT ABDOMEN PELVIS W IV CONTRAST Additional Contrast? Oral   Final Result   1. Circumferential wall thickening over a long segment of colon involving the   distal transverse, descending and sigmoid colon. The apparent thickening may be   accentuated by incomplete luminal distention. Colitis could be considered in the   setting of abdominal pain. 2. Hydropic gallbladder, no gallstones or obvious gallbladder wall thickening. CT ABDOMEN PELVIS RENAL STONE Additional Contrast? None   Final Result   1. No acute abdominal or pelvic abnormality noted on this limited examination   without the benefit of intravenous or oral contrast. Specifically, no renal or   ureteral stones present. There is no hydronephrosis or hydroureter.           [address] [facility] RETROPERITONEAL COMPLETE   Final Result      Mild right hydronephrosis, of uncertain etiology. Otherwise, unremarkable   ultrasound of the kidneys.                 Current Meds:  Current Facility-Administered Medications   Medication Dose Route Frequency    insulin glargine (LANTUS) injection vial 6 Units  6 Units SubCUTAneous Daily    methylPREDNISolone sodium (SOLU-MEDROL) injection 20 mg  20 mg IntraVENous Q8H    HYDROmorphone (DILAUDID) tablet 1 mg  1 mg Oral Q4H PRN    potassium chloride 20 mEq/50 mL IVPB (Central Line)  20 mEq IntraVENous PRN    Or    potassium chloride 10 mEq/100 mL IVPB (Peripheral Line)  10 mEq IntraVENous PRN    magnesium sulfate 2000 mg in 50 mL IVPB premix  2,000 mg IntraVENous PRN    sodium phosphate 10 mmol in sodium chloride 0.9 % 250 mL IVPB  10 mmol IntraVENous PRN    Or    sodium phosphate 15 mmol in sodium chloride 0.9 % 250 mL IVPB  15 mmol IntraVENous PRN    Or    sodium phosphate 20 mmol in sodium chloride 0.9 % 500 mL IVPB  20 mmol IntraVENous PRN    diatrizoate meglumine-sodium (GASTROGRAFIN) 66-10 % solution 15 mL  15 mL Oral ONCE PRN    [Held by provider] ferrous sulfate (IRON 325) tablet 325 mg  325 mg Oral BID WC    melatonin tablet 9 mg  9 mg Oral Nightly    traMADol (ULTRAM) tablet 50 mg  50 mg Oral Q6H PRN    Or    traMADol (ULTRAM) tablet 100 mg  100 mg Oral Q6H PRN    insulin lispro (HUMALOG) injection vial 0-4 Units  0-4 Units SubCUTAneous TID WC    insulin lispro (HUMALOG) injection vial 0-4 Units  0-4 Units SubCUTAneous Nightly    glucose chewable tablet 16 g  4 tablet Oral PRN    dextrose bolus 10% 125 mL  125 mL IntraVENous PRN    Or    dextrose bolus 10% 250 mL  250 mL IntraVENous PRN    glucagon (rDNA) injection 1 mg  1 mg SubCUTAneous PRN    dextrose 10 % infusion   IntraVENous Continuous PRN    hyoscyamine (LEVSIN/SL) sublingual tablet 125 mcg  125 mcg SubLINGual TID    [Held by provider] diphenoxylate-atropine (LOMOTIL) 2.5-0.025 MG per tablet 1 tablet  1 tablet Oral 4x Daily    buPROPion [facility] SR) extended release tablet 150 mg  150 mg Oral QAM    pantoprazole (PROTONIX) tablet 40 mg  40 mg Oral QAM AC    pravastatin (PRAVACHOL) tablet 80 mg  80 mg Oral Nightly    sertraline (ZOLOFT) tablet 100 mg  100 mg Oral Daily    sodium chloride flush 0.9 % injection 5-40 mL  5-40 mL IntraVENous 2 times per day    sodium chloride flush 0.9 % injection 5-40 mL  5-40 mL IntraVENous PRN    0.9 % sodium chloride infusion   IntraVENous PRN    ondansetron (ZOFRAN-ODT) disintegrating tablet 4 mg  4 mg Oral Q8H PRN    Or    ondansetron (ZOFRAN) injection 4 mg  4 mg IntraVENous Q6H PRN    polyethylene glycol (GLYCOLAX) packet 17 g  17 g Oral Daily PRN    acetaminophen (TYLENOL) tablet 650 mg  650 mg Oral Q6H PRN    Or    acetaminophen (TYLENOL) suppository 650 mg  650 mg Rectal Q6H PRN    nystatin (MYCOSTATIN) 073675 UNIT/ML suspension 500,000 Units  5 mL Oral 4x Daily    morphine injection 2 mg  2 mg IntraVENous Q4H PRN    rOPINIRole (REQUIP) tablet 0.5 mg  0.5 mg Oral Nightly       Signed:  Palak Jansen MD    Part of this note may have been written by using a voice dictation software. The note has been proof read but may still contain some grammatical/other typographical errors.

## 2022-08-22 NOTE — CARE COORDINATION
MSN CM:  patient's discharge is still unclear at this time. Patient may be transferred to [facility] for surgery - undetermined. Case Management will continue to follow.

## 2022-08-23 LAB
ANION GAP SERPL CALC-SCNC: ABNORMAL MMOL/L (ref 7–16)
BUN SERPL-MCNC: 30 MG/DL (ref 8–23)
CALCIUM SERPL-MCNC: 8.1 MG/DL (ref 8.3–10.4)
CHLORIDE SERPL-SCNC: 105 MMOL/L (ref 98–107)
CO2 SERPL-SCNC: 29 MMOL/L (ref 21–32)
CREAT SERPL-MCNC: 1 MG/DL (ref 0.6–1)
GLUCOSE BLD STRIP.AUTO-MCNC: 179 MG/DL (ref 65–100)
GLUCOSE BLD STRIP.AUTO-MCNC: 191 MG/DL (ref 65–100)
GLUCOSE BLD STRIP.AUTO-MCNC: 309 MG/DL (ref 65–100)
GLUCOSE BLD STRIP.AUTO-MCNC: 357 MG/DL (ref 65–100)
GLUCOSE SERPL-MCNC: 152 MG/DL (ref 65–100)
POTASSIUM SERPL-SCNC: 4.2 MMOL/L (ref 3.5–5.1)
SERVICE CMNT-IMP: ABNORMAL
SODIUM SERPL-SCNC: 133 MMOL/L (ref 136–145)

## 2022-08-23 PROCEDURE — 6370000000 HC RX 637 (ALT 250 FOR IP): Performed by: INTERNAL MEDICINE

## 2022-08-23 PROCEDURE — 82962 GLUCOSE BLOOD TEST: CPT

## 2022-08-23 PROCEDURE — 2580000003 HC RX 258: Performed by: INTERNAL MEDICINE

## 2022-08-23 PROCEDURE — 6370000000 HC RX 637 (ALT 250 FOR IP): Performed by: HOSPITALIST

## 2022-08-23 PROCEDURE — 1100000000 HC RM PRIVATE

## 2022-08-23 PROCEDURE — 80048 BASIC METABOLIC PNL TOTAL CA: CPT

## 2022-08-23 PROCEDURE — 6370000000 HC RX 637 (ALT 250 FOR IP): Performed by: PHYSICIAN ASSISTANT

## 2022-08-23 PROCEDURE — 6360000002 HC RX W HCPCS: Performed by: STUDENT IN AN ORGANIZED HEALTH CARE EDUCATION/TRAINING PROGRAM

## 2022-08-23 RX ORDER — DIPHENOXYLATE HYDROCHLORIDE AND ATROPINE SULFATE 2.5; .025 MG/1; MG/1
1 TABLET ORAL 4 TIMES DAILY PRN
Status: DISCONTINUED | OUTPATIENT
Start: 2022-08-23 | End: 2022-08-24 | Stop reason: HOSPADM

## 2022-08-23 RX ORDER — HYDROMORPHONE HYDROCHLORIDE 2 MG/1
1 TABLET ORAL EVERY 4 HOURS PRN
Status: DISCONTINUED | OUTPATIENT
Start: 2022-08-23 | End: 2022-08-23

## 2022-08-23 RX ORDER — HYDROMORPHONE HYDROCHLORIDE 2 MG/1
2 TABLET ORAL EVERY 8 HOURS PRN
Status: DISCONTINUED | OUTPATIENT
Start: 2022-08-23 | End: 2022-08-24 | Stop reason: HOSPADM

## 2022-08-23 RX ORDER — HYDROMORPHONE HYDROCHLORIDE 2 MG/1
1 TABLET ORAL EVERY 4 HOURS PRN
Status: DISCONTINUED | OUTPATIENT
Start: 2022-08-23 | End: 2022-08-24 | Stop reason: HOSPADM

## 2022-08-23 RX ADMIN — PANTOPRAZOLE SODIUM 40 MG: 40 TABLET, DELAYED RELEASE ORAL at 05:48

## 2022-08-23 RX ADMIN — PRAVASTATIN SODIUM 80 MG: 80 TABLET ORAL at 21:13

## 2022-08-23 RX ADMIN — NYSTATIN 500000 UNITS: 100000 SUSPENSION ORAL at 21:18

## 2022-08-23 RX ADMIN — SODIUM CHLORIDE, PRESERVATIVE FREE 10 ML: 5 INJECTION INTRAVENOUS at 21:19

## 2022-08-23 RX ADMIN — HYDROMORPHONE HYDROCHLORIDE 2 MG: 2 TABLET ORAL at 16:58

## 2022-08-23 RX ADMIN — HYDROMORPHONE HYDROCHLORIDE 1 MG: 2 TABLET ORAL at 05:47

## 2022-08-23 RX ADMIN — INSULIN LISPRO 4 UNITS: 100 INJECTION, SOLUTION INTRAVENOUS; SUBCUTANEOUS at 12:23

## 2022-08-23 RX ADMIN — HYDROMORPHONE HYDROCHLORIDE 1 MG: 2 TABLET ORAL at 11:57

## 2022-08-23 RX ADMIN — METHYLPREDNISOLONE SODIUM SUCCINATE 20 MG: 40 INJECTION, POWDER, FOR SOLUTION INTRAMUSCULAR; INTRAVENOUS at 21:19

## 2022-08-23 RX ADMIN — BUPROPION HYDROCHLORIDE 150 MG: 150 TABLET, EXTENDED RELEASE ORAL at 08:23

## 2022-08-23 RX ADMIN — HYOSCYAMINE SULFATE 125 MCG: 0.12 TABLET ORAL at 08:23

## 2022-08-23 RX ADMIN — INSULIN LISPRO 4 UNITS: 100 INJECTION, SOLUTION INTRAVENOUS; SUBCUTANEOUS at 21:13

## 2022-08-23 RX ADMIN — METHYLPREDNISOLONE SODIUM SUCCINATE 20 MG: 40 INJECTION, POWDER, FOR SOLUTION INTRAMUSCULAR; INTRAVENOUS at 12:22

## 2022-08-23 RX ADMIN — INSULIN GLARGINE 9 UNITS: 100 INJECTION, SOLUTION SUBCUTANEOUS at 08:22

## 2022-08-23 RX ADMIN — HYOSCYAMINE SULFATE 125 MCG: 0.12 TABLET ORAL at 12:23

## 2022-08-23 RX ADMIN — NYSTATIN 500000 UNITS: 100000 SUSPENSION ORAL at 12:23

## 2022-08-23 RX ADMIN — DIPHENOXYLATE HYDROCHLORIDE AND ATROPINE SULFATE 1 TABLET: 2.5; .025 TABLET ORAL at 22:55

## 2022-08-23 RX ADMIN — METHYLPREDNISOLONE SODIUM SUCCINATE 20 MG: 40 INJECTION, POWDER, FOR SOLUTION INTRAMUSCULAR; INTRAVENOUS at 05:14

## 2022-08-23 RX ADMIN — HYOSCYAMINE SULFATE 125 MCG: 0.12 TABLET ORAL at 21:12

## 2022-08-23 RX ADMIN — NYSTATIN 500000 UNITS: 100000 SUSPENSION ORAL at 16:58

## 2022-08-23 RX ADMIN — Medication 9 MG: at 21:13

## 2022-08-23 RX ADMIN — ROPINIROLE HYDROCHLORIDE 0.5 MG: 0.5 TABLET, FILM COATED ORAL at 21:13

## 2022-08-23 RX ADMIN — SODIUM CHLORIDE, PRESERVATIVE FREE 10 ML: 5 INJECTION INTRAVENOUS at 08:23

## 2022-08-23 RX ADMIN — SERTRALINE HYDROCHLORIDE 100 MG: 25 TABLET ORAL at 08:23

## 2022-08-23 RX ADMIN — NYSTATIN 500000 UNITS: 100000 SUSPENSION ORAL at 08:23

## 2022-08-23 ASSESSMENT — PAIN DESCRIPTION - DESCRIPTORS
DESCRIPTORS: CRAMPING;DISCOMFORT;ACHING
DESCRIPTORS: ACHING
DESCRIPTORS: ACHING

## 2022-08-23 ASSESSMENT — PAIN DESCRIPTION - ORIENTATION
ORIENTATION: UPPER
ORIENTATION: UPPER

## 2022-08-23 ASSESSMENT — PAIN DESCRIPTION - LOCATION
LOCATION: ABDOMEN

## 2022-08-23 ASSESSMENT — PAIN SCALES - GENERAL
PAINLEVEL_OUTOF10: 8
PAINLEVEL_OUTOF10: 8
PAINLEVEL_OUTOF10: 6
PAINLEVEL_OUTOF10: 0
PAINLEVEL_OUTOF10: 6
PAINLEVEL_OUTOF10: 7
PAINLEVEL_OUTOF10: 8
PAINLEVEL_OUTOF10: 8

## 2022-08-23 ASSESSMENT — PAIN - FUNCTIONAL ASSESSMENT: PAIN_FUNCTIONAL_ASSESSMENT: ACTIVITIES ARE NOT PREVENTED

## 2022-08-23 NOTE — PROGRESS NOTES
Pt resting in bed with  at bedside. No distress noted at this time. Call light in reach, will monitor.

## 2022-08-23 NOTE — PROGRESS NOTES
Pt resting in bed. Pt on RA. Pt complaints of abd pain 8/10 at this time. Pt reports not much relief from po dilaudid this am. Pt reports 4-5 very small loose stools during the night. No distress noted at this time. Pt encouraged to call for assistance if needed call light in reach, will monitor.

## 2022-08-23 NOTE — PROGRESS NOTES
[facility] Progress Note         Admit Date:  8/8/2022    Today's Date:  8/23/2022    CC:  Crohn's disease, abdominal pain, diarrhea    Subjective:     Awaiting transfer. Eating well.  Not needing TPN    Medications:   Current Facility-Administered Medications   Medication Dose Route Frequency    HYDROmorphone (DILAUDID) tablet 1 mg  1 mg Oral Q4H PRN    HYDROmorphone (DILAUDID) tablet 2 mg  2 mg Oral Q8H PRN    insulin glargine (LANTUS) injection vial 9 Units  9 Units SubCUTAneous Daily    methylPREDNISolone sodium (SOLU-MEDROL) injection 20 mg  20 mg IntraVENous Q8H    potassium chloride 20 mEq/50 mL IVPB (Central Line)  20 mEq IntraVENous PRN    Or    potassium chloride 10 mEq/100 mL IVPB (Peripheral Line)  10 mEq IntraVENous PRN    magnesium sulfate 2000 mg in 50 mL IVPB premix  2,000 mg IntraVENous PRN    sodium phosphate 10 mmol in sodium chloride 0.9 % 250 mL IVPB  10 mmol IntraVENous PRN    Or    sodium phosphate 15 mmol in sodium chloride 0.9 % 250 mL IVPB  15 mmol IntraVENous PRN    Or    sodium phosphate 20 mmol in sodium chloride 0.9 % 500 mL IVPB  20 mmol IntraVENous PRN    diatrizoate meglumine-sodium (GASTROGRAFIN) 66-10 % solution 15 mL  15 mL Oral ONCE PRN    [Held by provider] ferrous sulfate (IRON 325) tablet 325 mg  325 mg Oral BID WC    melatonin tablet 9 mg  9 mg Oral Nightly    insulin lispro (HUMALOG) injection vial 0-4 Units  0-4 Units SubCUTAneous TID WC    insulin lispro (HUMALOG) injection vial 0-4 Units  0-4 Units SubCUTAneous Nightly    glucose chewable tablet 16 g  4 tablet Oral PRN    dextrose bolus 10% 125 mL  125 mL IntraVENous PRN    Or    dextrose bolus 10% 250 mL  250 mL IntraVENous PRN    glucagon (rDNA) injection 1 mg  1 mg SubCUTAneous PRN    dextrose 10 % infusion   IntraVENous Continuous PRN    hyoscyamine (LEVSIN/SL) sublingual tablet 125 mcg  125 mcg SubLINGual TID    [Held by provider] diphenoxylate-atropine (LOMOTIL) 2.5-0.025 MG per tablet 1 tablet  1 tablet Oral 4x Daily    buPROPion [facility]) extended release tablet 150 mg  150 mg Oral QAM    pantoprazole (PROTONIX) tablet 40 mg  40 mg Oral QAM AC    pravastatin (PRAVACHOL) tablet 80 mg  80 mg Oral Nightly    sertraline (ZOLOFT) tablet 100 mg  100 mg Oral Daily    sodium chloride flush 0.9 % injection 5-40 mL  5-40 mL IntraVENous 2 times per day    sodium chloride flush 0.9 % injection 5-40 mL  5-40 mL IntraVENous PRN    0.9 % sodium chloride infusion   IntraVENous PRN    ondansetron (ZOFRAN-ODT) disintegrating tablet 4 mg  4 mg Oral Q8H PRN    Or    ondansetron (ZOFRAN) injection 4 mg  4 mg IntraVENous Q6H PRN    polyethylene glycol (GLYCOLAX) packet 17 g  17 g Oral Daily PRN    acetaminophen (TYLENOL) tablet 650 mg  650 mg Oral Q6H PRN    Or    acetaminophen (TYLENOL) suppository 650 mg  650 mg Rectal Q6H PRN    nystatin (MYCOSTATIN) 695150 UNIT/ML suspension 500,000 Units  5 mL Oral 4x Daily    morphine injection 2 mg  2 mg IntraVENous Q4H PRN    rOPINIRole (REQUIP) tablet 0.5 mg  0.5 mg Oral Nightly       Review of Systems:  ROS was obtained, with pertinent positives as listed above. No chest pain or SOB. Diet:  Regular, GI bland, Low fiber     Objective:   Vitals:  /74   Pulse 79   Temp 97.3 °F (36.3 °C) (Oral)   Resp 18   Ht 5' 3\" (1.6 m)   Wt 135 lb 4.8 oz (61.4 kg)   SpO2 98%   BMI 23.97 kg/m²   Intake/Output:  No intake/output data recorded.   08/21 1901 - 08/23 0700  In: 5 [P.O.:720]  Out: -   Exam:  General appearance: alert, cooperative, no distress, lying in bed, left chest PAC  Lungs: non-labored breathing  Abdomen: non-distended  Extremities: extremities normal, atraumatic, no cyanosis or edema  Neuro:  alert and oriented    Data Review (Labs):    Recent Labs     08/21/22  0518 08/22/22  0414 08/23/22  0729    137 133*   K 3.7 4.0 4.2    105 105   CO2 25 26 29   BUN 30* 33* 30*         Assessment:     Principal Problem:    JULIETH (acute kidney injury) [facility])  Active Problems:    Crohn's disease ([address]    Hypertension    Hyperlipidemia    Depression    Moderate protein-calorie malnutrition (HCC)    Iron deficiency anemia due to chronic blood loss    Hyperglycemia    RLS (restless legs syndrome)    CKD (chronic kidney disease) stage 3, GFR 30-59 ml/min (HCC)    Colitis    Anemia    Essential hypertension, benign  Resolved Problems:    Hyponatremia    Hyperkalemia    Acute hypokalemia    65 y.o. female with PMH including but not limited to crohns disease (lg and sm bowel), HTN, CKD3, depression, HLP, RLS, who we are following for Crohn's flare with abdominal cramping, nausea, rectal spasm, non-bloody diarrhea, dehydration, and wt loss. She has failed Remicade, Humira, Entyvio, Stelara. Currenlty on Wynelle Clipper but not in remission. Recent Cdiff neg, Calprotectin >1200 and CT evidence of bowel thickening cw CD. Has electrolyte imbalance and JULIETH on admission. 8/19/22:  Colonoscopy with severe Crohn's colitis from hepatic flexure through rectum with perianal involvement, right side and TI was normal.  Surgery consulted.     Has failed Remicade, HumiraJodi and Stelara     After surgery will need Skyrizi or another therapy, preferably not TNF inhibitor    Plan:     F/u pathology  Transfer for surgery  Skyrizi after surgery  Maximize nutrition for the best surgical outcomes      Alfredo Velazquez MD  [facility], [place]

## 2022-08-23 NOTE — PROGRESS NOTES
Hospitalist Progress Note   Admit Date:  2022  3:17 PM   Name:  Jenny Peraza   Age:  65 y.o. Sex:  female  :  1960   MRN:  [id number]   Room:  [facility]/    Presenting Complaint: Nausea     Reason(s) for Admission: Dehydration [E86.0]  Hypokalemia [E87.6]  JULIETH (acute kidney injury) ([facility] [address] [N17.9]  Diarrhea, unspecified type [R19.7]  Acute renal failure superimposed on chronic kidney disease, unspecified CKD stage, unspecified acute renal failure type ([address] [N17.9, N18.9]     Hospital Course & Interval History:     Please refer to the admission H&P for details of presentation. In summary, Jenny Peraza is a 65 y.o. female with medical history significant for crohns disease, HTN, CKD3, depression, HLP who was admitted for several weeks of nausea, emesis and abdominal pain. GI was consulted due to concerns for crohns flare up. Patient was started on IV steroids. Patient symptoms improved initially but worsened after transitioning to PO steroids. CT A/P on  was done due to continued abdominal pain. It showed circumferential wall thickening of colon possibly suggestive of colitis, therefore she was started on cipro and flagyl. Patient also had multiple bouts of diarrhea but was unable to be verified by staff. Stool studies including C. difficile has been negative. Patient continues to have abdominal pain frequent episodes of diarrhea/BM although stool amount has decreased. IV steroids has been switched to p.o. steroids improvement in her symptoms. Underwent colonoscopy on  which showed severe Crohn colitis characterized by diffuse erythema, edema, deep ulceration obliteration of vascular pattern and cobblestoning beginning the hepatic flexure and extending into and involving the rectum. Surgery has been consulted for resection and they are recommending colorectal specialist due to extend of the disease.   She has been transitioned back to IV steroids and diet has been advanced to regular. , from GI, has contacted (who has seen her in the past) for treatment/dispo options. Subjective/24 hr Events (08/23/22) : Patient is seen and examined at bedside. No acute events reported overnight by nursing staff. Patient is tolerating regular diet but still having early satiety as well as abdominal pain. She reports 4-5 episodes of BM in 12hrs. [place] with some abdominal pain. Feels as though pain medication hasnt help controll the pain in this AM.   Patient denies fever, chills, chest pains, shortness of breath, n/v.    Review of Systems: 10 point review of systems is otherwise negative with the exception of the elements mentioned above.'        Assessment & Plan:     Crohn's disease with flare  Severe crohn's disease. Stool studies have been neg including c.diff. CT A/P on 8/13 with findings suggestive of colitis. Colonoscopy on 8/19 which showed severe Crohn colitis characterized by diffuse erythema, edema, deep ulceration obliteration of vascular pattern and cobblestoning beginning the hepatic flexure and extending into and involving the rectum. 08/23/22: stable with abdominal pain on IV steroids and pain regimen.   - GI recommendations appreciated. [facility] Schrad[facility]. - General Surgery recommending Colorectal specialist.   - continue on IV steroids  - Now on regular diet    Hyperglycemia  08/23/22: FS continues to he elevated. A1c of 6.1  - on lantus to 9U qAM and titirate  - insulin sliding scale  - obtain FS 3 times daily prior to meals and before bedtime. JULIETH on CKD3  Creatinine peaked at 3.80 on 8/8 08/23/22: Cr stable at 1.00.  - encouraged PO intake.   - monitor renal function    HTN  // HLD  08/23/22:  BP stable off anti-HTN meds  -Continue to hold and restart as needed  -Continue with home statin    Depression: continue with home wellbutrin and zoloft  RLS: continue with requip    Moderate Protein Calorie Malnutrition  - nutrition on board    Anemia, iron deficiency  - now on PO iron    Discharge Planning:  pending Surgical eval for colectomy given severe crohn's disease    Diet:  ADULT DIET; Regular; GI [place] (GERD/Peptic Ulcer); Low Fiber; No red dye  ADULT ORAL NUTRITION SUPPLEMENT; Breakfast, Dinner, Lunch; Standard High Calorie/High Protein Oral Supplement  DVT PPx: SCDs  Code status: Full Code      I spent 36 minutes of time caring for this patient at bedside or nearby, and more than 50 percent of which was spent on coordination of care and/or patient/family counseling regarding the disease process, status , and treatment options/plan of care. Called transfer center to arrange for transfer to [facility].       Hospital Problems:  Principal Problem:    JULIETH (acute kidney injury) ([facility] [address]  Active Problems:    Crohn's disease ([facility] [address]    Hypertension    Hyperlipidemia    Depression    Moderate protein-calorie malnutrition (HCC)    Iron deficiency anemia due to chronic blood loss    Hyperglycemia    RLS (restless legs syndrome)    CKD (chronic kidney disease) stage 3, GFR 30-59 ml/min (HCC)    Colitis    Anemia    Essential hypertension, benign  Resolved Problems:    Hyponatremia    Hyperkalemia    Acute hypokalemia      Objective:   Patient Vitals for the past 24 hrs:   Temp Pulse Resp BP SpO2   08/23/22 1157 -- -- 16 -- --   08/23/22 1118 98.4 °F (36.9 °C) 79 18 116/72 99 %   08/23/22 0738 97.9 °F (36.6 °C) 75 18 139/70 96 %   08/23/22 0617 -- -- 16 -- --   08/23/22 0547 -- -- 16 -- --   08/23/22 0357 97.7 °F (36.5 °C) 75 18 133/73 97 %   08/22/22 2331 98.1 °F (36.7 °C) 70 18 130/69 96 %   08/22/22 2052 -- -- 18 -- --   08/22/22 2015 97.5 °F (36.4 °C) 74 18 131/77 100 %   08/22/22 1549 98.1 °F (36.7 °C) 80 17 139/76 96 %   08/22/22 1540 -- -- 16 -- --       Oxygen Therapy  SpO2: 99 %  Pulse via Oximetry: 87 beats per minute  Pulse Oximeter Device Mode: Continuous  Pulse Oximeter Device Location: Right, Finger  O2 Device: None (Room air)  Skin Assessment: Clean, dry, & intact  Oxygen Therapy: None (Room air)    Estimated body mass index is 23.97 kg/m² as calculated from the following:    Height as of this encounter: 5' 3\" (1.6 m). Weight as of this encounter: 135 lb 4.8 oz (61.4 kg). Intake/Output Summary (Last 24 hours) at 8/23/2022 1253  Last data filed at 8/22/2022 1750  Gross per 24 hour   Intake 480 ml   Output --   Net 480 ml         Physical Exam:     Blood pressure 116/72, pulse 79, temperature 98.4 °F (36.9 °C), temperature source Oral, resp. rate 16, height 5' 3\" (1.6 m), weight 135 lb 4.8 oz (61.4 kg), SpO2 99 %. General:    Alert and awake. Not in apparent distress  Head:  Normocephalic, atraumatic  Eyes:  Sclerae appear normal.  Pupils equally round. ENT:  Nares appear normal, no drainage. Moist oral mucosa  Neck:  No restricted ROM. Trachea midline   CV:   RRR. No m/r/g. No jugular venous distension. Lungs:   CTAB. No wheezing. Symmetric expansion. Abdomen: Bowel sounds present. Soft, nontender, nondistended. Extremities: No cyanosis or clubbing. No edema  Skin:     No rashes and normal coloration. Warm and dry. Neuro:  CN II-XII grossly intact. A&Ox3  Psych:  Normal mood and affect.       I have personally reviewed labs and tests showing:  Recent Labs:  Recent Results (from the past 48 hour(s))   POCT Glucose    Collection Time: 08/21/22  4:27 PM   Result Value Ref Range    POC Glucose 267 (H) 65 - 100 mg/dL    Performed by: Joseph    POCT Glucose    Collection Time: 08/21/22  9:12 PM   Result Value Ref Range    POC Glucose 325 (H) 65 - 100 mg/dL    [facility]: [facility]    Basic Metabolic Panel    Collection Time: 08/22/22  4:14 AM   Result Value Ref Range    Sodium 137 136 - 145 mmol/L    Potassium 4.0 3.5 - 5.1 mmol/L    Chloride 105 98 - 107 mmol/L    CO2 26 21 - 32 mmol/L    Anion Gap 6 (L) 7 - 16 mmol/L    Glucose 151 (H) 65 - 100 mg/dL    BUN 33 (H) 8 - 23 MG/DL    Creatinine 1.00 0.6 - 1.0 MG/DL    GFR African American >60 >60 ml/min/1.73m2    GFR Non- 60 (L) >60 ml/min/1.73m2    Calcium 7.7 (L) 8.3 - 10.4 MG/DL   POCT Glucose    Collection Time: 08/22/22  7:35 AM   Result Value Ref Range    POC Glucose 166 (H) 65 - 100 mg/dL    Performed by: Davina Houston    POCT Glucose    Collection Time: 08/22/22 11:55 AM   Result Value Ref Range    POC Glucose 236 (H) 65 - 100 mg/dL    Performed by: Davina Houston    POCT Glucose    Collection Time: 08/22/22  4:20 PM   Result Value Ref Range    POC Glucose 279 (H) 65 - 100 mg/dL    Performed by: Olimpia    POCT Glucose    Collection Time: 08/22/22  8:48 PM   Result Value Ref Range    POC Glucose 210 (H) 65 - 100 mg/dL    [facility] by: [facility]    Basic Metabolic Panel    Collection Time: 08/23/22  7:29 AM   Result Value Ref Range    Sodium 133 (L) 136 - 145 mmol/L    Potassium 4.2 3.5 - 5.1 mmol/L    Chloride 105 98 - 107 mmol/L    CO2 29 21 - 32 mmol/L    Anion Gap Cannot be calculated 7 - 16 mmol/L    Glucose 152 (H) 65 - 100 mg/dL    BUN 30 (H) 8 - 23 MG/DL    Creatinine 1.00 0.6 - 1.0 MG/DL    GFR African American >60 >60 ml/min/1.73m2    GFR Non- 60 (L) >60 ml/min/1.73m2    Calcium 8.1 (L) 8.3 - 10.4 MG/DL   POCT Glucose    Collection Time: 08/23/22  7:39 AM   Result Value Ref Range    POC Glucose 191 (H) 65 - 100 mg/dL    Performed by: Tucker    POCT Glucose    Collection Time: 08/23/22 11:19 AM   Result Value Ref Range    POC Glucose 357 (H) 65 - 100 mg/dL    Performed by: REAL Pinzon [address]        I have personally reviewed imaging studies showing: Other Studies:  CT ABDOMEN PELVIS W IV CONTRAST Additional Contrast? Oral   Final Result   1. Circumferential wall thickening over a long segment of colon involving the   distal transverse, descending and sigmoid colon. The apparent thickening may be   accentuated by incomplete luminal distention.  Colitis could be considered in the   setting of abdominal pain. 2. Hydropic gallbladder, no gallstones or obvious gallbladder wall thickening. CT ABDOMEN PELVIS RENAL STONE Additional Contrast? None   Final Result   1. No acute abdominal or pelvic abnormality noted on this limited examination   without the benefit of intravenous or oral contrast. Specifically, no renal or   ureteral stones present. There is no hydronephrosis or hydroureter. US RETROPERITONEAL COMPLETE   Final Result      Mild right hydronephrosis, of uncertain etiology. Otherwise, unremarkable   ultrasound of the kidneys.                 Current Meds:  Current Facility-Administered Medications   Medication Dose Route Frequency    HYDROmorphone (DILAUDID) tablet 1 mg  1 mg Oral Q4H PRN    HYDROmorphone (DILAUDID) tablet 2 mg  2 mg Oral Q8H PRN    insulin glargine (LANTUS) injection vial 9 Units  9 Units SubCUTAneous Daily    methylPREDNISolone sodium (SOLU-MEDROL) injection 20 mg  20 mg IntraVENous Q8H    potassium chloride 20 mEq/50 mL IVPB (Central Line)  20 mEq IntraVENous PRN    Or    potassium chloride 10 mEq/100 mL IVPB (Peripheral Line)  10 mEq IntraVENous PRN    magnesium sulfate 2000 mg in 50 mL IVPB premix  2,000 mg IntraVENous PRN    sodium phosphate 10 mmol in sodium chloride 0.9 % 250 mL IVPB  10 mmol IntraVENous PRN    Or    sodium phosphate 15 mmol in sodium chloride 0.9 % 250 mL IVPB  15 mmol IntraVENous PRN    Or    sodium phosphate 20 mmol in sodium chloride 0.9 % 500 mL IVPB  20 mmol IntraVENous PRN    diatrizoate meglumine-sodium (GASTROGRAFIN) 66-10 % solution 15 mL  15 mL Oral ONCE PRN    [Held by provider] ferrous sulfate (IRON 325) tablet 325 mg  325 mg Oral BID     melatonin tablet 9 mg  9 mg Oral Nightly    insulin lispro (HUMALOG) injection vial 0-4 Units  0-4 Units SubCUTAneous TID WC    insulin lispro (HUMALOG) injection vial 0-4 Units  0-4 Units SubCUTAneous Nightly    glucose chewable tablet 16 g  4 tablet Oral PRN dextrose bolus 10% 125 mL  125 mL IntraVENous PRN    Or    dextrose bolus 10% 250 mL  250 mL IntraVENous PRN    glucagon (rDNA) injection 1 mg  1 mg SubCUTAneous PRN    dextrose 10 % infusion   IntraVENous Continuous PRN    hyoscyamine (LEVSIN/SL) sublingual tablet 125 mcg  125 mcg SubLINGual TID    [Held by provider] diphenoxylate-atropine (LOMOTIL) 2.5-0.025 MG per tablet 1 tablet  1 tablet Oral 4x Daily    buPROPion (WELLBUTRIN SR) extended release tablet 150 mg  150 mg Oral QAM    pantoprazole (PROTONIX) tablet 40 mg  40 mg Oral QAM AC    pravastatin (PRAVACHOL) tablet 80 mg  80 mg Oral Nightly    sertraline (ZOLOFT) tablet 100 mg  100 mg Oral Daily    sodium chloride flush 0.9 % injection 5-40 mL  5-40 mL IntraVENous 2 times per day    sodium chloride flush 0.9 % injection 5-40 mL  5-40 mL IntraVENous PRN    0.9 % sodium chloride infusion   IntraVENous PRN    ondansetron (ZOFRAN-ODT) disintegrating tablet 4 mg  4 mg Oral Q8H PRN    Or    ondansetron (ZOFRAN) injection 4 mg  4 mg IntraVENous Q6H PRN    polyethylene glycol (GLYCOLAX) packet 17 g  17 g Oral Daily PRN    acetaminophen (TYLENOL) tablet 650 mg  650 mg Oral Q6H PRN    Or    acetaminophen (TYLENOL) suppository 650 mg  650 mg Rectal Q6H PRN    nystatin (MYCOSTATIN) 098473 UNIT/ML suspension 500,000 Units  5 mL Oral 4x Daily    morphine injection 2 mg  2 mg IntraVENous Q4H PRN    rOPINIRole (REQUIP) tablet 0.5 mg  0.5 mg Oral Nightly       Signed:  Kilo Borjas MD    Part of this note may have been written by using a voice dictation software. The note has been proof read but may still contain some grammatical/other typographical errors.

## 2022-08-23 NOTE — PROGRESS NOTES
Comprehensive Nutrition Assessment    Type and Reason for Visit: Reassess  Malnutrition Screening Tool: Malnutrition Screen  Have you recently lost weight without trying?: 14 to 23 pounds (2 points)  Have you been eating poorly because of a decreased appetite?: Yes (1 point)  Malnutrition Screening Tool Score: 3  TPN consult (Hospitalist)  Nutrition Recommendations/Plan:   Meals and Snacks:  Diet: Continue current order  Continue to encouraged pt to have family/spouse provide preferred meals/food to increase kcal and protein intake during the day. Pt encouraged to continue to order not  meal selections to promote oral intake. Nutrition Supplement Therapy:  Medical food supplement therapy:  Continue  Ensure Enlive three times per day (this provides 350 kcal and 20 grams protein per bottle)     Malnutrition Assessment:  Malnutrition Status: Moderate malnutrition  Context: Chronic Illness  Findings of clinical characteristics of malnutrition:   Energy Intake:  Mild decrease in energy intake (Comment) (bites at meals x3 weeks)  Weight Loss:  Mild weight loss (specify amount and time period) (20% wt loss since 6/2021)     Body Fat Loss:  Mild body fat loss Triceps, Fat Overlying Ribs   Muscle Mass Loss:  Mild muscle mass loss Clavicles (pectoralis & deltoids), Thigh (quadraceps), Calf (gastrocnemius), Scapula (trapezius), Hand (interosseous)  Fluid Accumulation:  No significant fluid accumulation     Strength:  Not Performed  Nutrition Assessment:  Nutrition History: Pt states she has been having poor po intake x3 weeks PTA consuming bites at meals. She reports taste changes, loss of appetite, and N/V/D. Pt reports ongoing wt loss of the past 2 years weighing 200lbs prior to wt loss. Per EMR, pt weighed 167lbs 6/8/21. Do You Have Any Cultural, Worship, or Ethnic Food Preferences?: No   Nutrition Background:   Pt with PMH notable for Crohn's disease of large and small bowel, GERD, HTN, OA, and CKD III.  Pt presents d/t diarrhea, nausea, abdominal cramping and decreased po intake. Pt admitted with JULIETH and Crohn's flare. Colonoscopy 8/19: Severe Crohn's colitis extending from hepatic flexure through rectum with perianal disease  Right side and terminal ileum do not appear to be involved  Nutrition Interval:  PORT in place - accessed 8/14. PPN started 8/14, converted to PPN 8/15 d/c 8/21. Pt reports po improving starting yesterday and again this am.  Reports she feels better than she did with the drip referring to TPN. She is now consuming cereals, sandwiches and reports better tolerance of lighter items, quantity of intake increasing per her recall and discussion with Nir Malin RN. Pt c/o increased pain with sitting up limiting total quantity of oral intake. Abdominal Status (last documented):   Last BM (including prior to admit): 08/21/22, GI Symptoms: Diarrhea   Pt reports 4-5 small stools overnight  Pertinent Medications: wellbutrin, Lantus (9 units daily), SSI, melatonin, solu-medrol Q8H, nystatin, protonix, pravachol  PRN: dilaudid po (last admin 8/23), zofran (last admin 8/18)  Electrolyte Replacement: 8/14: magnesium sulfate 2gm x2, sodium phos 15mmol; 8/19: sodium phos 15mmol  Pertinent Labs:   Lab Results   Component Value Date/Time     08/23/2022 07:29 AM    K 4.2 08/23/2022 07:29 AM     08/23/2022 07:29 AM    CO2 29 08/23/2022 07:29 AM    BUN 30 08/23/2022 07:29 AM    CREATININE 1.00 08/23/2022 07:29 AM    GLUCOSE 152 08/23/2022 07:29 AM    CALCIUM 8.1 08/23/2022 07:29 AM    PHOS 2.0 08/19/2022 04:36 AM    MG 2.1 08/20/2022 05:02 AM      Lab Results   Component Value Date/Time    POCGLU 191 08/23/2022 07:39 AM    POCGLU 210 08/22/2022 08:48 PM    POCGLU 279 08/22/2022 04:20 PM    POCGLU 236 08/22/2022 11:55 AM    POCGLU 166 08/22/2022 07:35 AM    POCGLU 325 08/21/2022 09:12 PM       Labs reviewed and remarkable for slight increase in Na.  K improving,Glucose ranging from 198-285 since start of 4 units of Lantus. Current Nutrition Therapies:  ADULT DIET; Regular; GI [place] (GERD/Peptic Ulcer); Low Fiber; No red dye  ADULT ORAL NUTRITION SUPPLEMENT; Breakfast, Dinner, Lunch; Standard High Calorie/High Protein Oral Supplement  Current Parenteral Nutrition Orders:  Type and Formula:  (Discontinued 8/21)   Lipids: 250ml, Daily  Duration: Continuous  Rate/Volume: 85ml/hr (2L)  Current PN Order Provides: infusing per order  Goal PN Orders Provides: 1180 kcal/d (78% of needs), 85 grams of protein/d (108% of needs), 100 grams of CHO/d and 2250 ml of total volume/d    Current Intake:   Average Meal Intake: 26-50% Average Supplements Intake:  (one full serving over a day)      Anthropometric Measures:  Height: 5' 3\" (160 cm)  Current Body Wt: 135 lb 5.8 oz (61.4 kg) (8/23), Weight source: Bed Scale  BMI: 24, Normal Weight (BMI 18.5-24. 9)  Admission Body Weight: 133 lb 13.1 oz (60.7 kg) (8/9; bed scale)  Ideal Body Weight (Kg) (Calculated): 52 kg (115 lbs), 116.4 %  Usual Body Wt: 167 lb (75.8 kg) (6/8/21; MD office visit), Percent weight change: -19.9       Edema:Peripheral Vascular  Peripheral Vascular (WDL): Within Defined Limits   BMI Category Normal Weight (BMI 18.5-24. 9)  Estimated Daily Nutrient Needs:  Energy (kcal/day): 0398-6556 (Kcal/kg (25-30) Weight used:   Current (60.7kg)  Protein (g/day): 61-79 (1-1.3 g/kg) Weight Used: (Current) 61 kg (8/10)  Fluid (ml/day):   (1 ml/kcal)    Nutrition Diagnosis:   Inadequate oral intake related to altered GI function, early satiety (food preferences) as evidenced by  (pt reported barreirs, intake meeting >65% needs)   Moderate malnutrition, In context of chronic illness related to inadequate protein-energy intake as evidenced by Criteria as identified in malnutrition assessment  Nutrition Interventions:   Food and/or Nutrient Delivery: Continue Current Diet, Continue Oral Nutrition Supplement     Coordination of Nutrition Care: Continue to monitor while inpatient  Plan of Care discussed with: Yohannes Trujillo RN    Goals:   Previous Goal Met: Goal(s) Achieved  Active Goal: Meet at least 75% of estimated needs, by next RD assessment       Nutrition Monitoring and Evaluation:      Food/Nutrient Intake Outcomes: Food and Nutrient Intake, Supplement Intake  Physical Signs/Symptoms Outcomes: GI Status, Meal Time Behavior, Weight, Biochemical Data    Discharge Planning:    Continue Oral Nutrition Supplement    Roslyn Sorensen RD

## 2022-08-23 NOTE — PROGRESS NOTES
Per GI team, Dr. Evy Sahu has accepted the patient as consultation service and is requesting hospitalist team at [facility] to be primary into the admission. Updated [address] regarding who the admitting service should be. Per Michelle 87 had told them that there are no beds available and they do not have waiting list.  They also cannot say when bed will become available. Therefore, we will need to call daily to see if they have a bed available. Only when bed is available, I would be able to talk to the Hospitalist Admitting team.      Will reach back tomorrow to see if bed is available.

## 2022-08-23 NOTE — PROGRESS NOTES
Dr. Pamela Lopez with [facility] hospital system has agreed to take pt as a transfer for surgical evaluation and treatment. Attending, Dr. Emanuel Lanier notified.        LAURA Echeverria  [facility]

## 2022-08-23 NOTE — PROGRESS NOTES
Pt sitting up in bed eating dinner. Pt with poor po intake and reports 5 small BM for this shift. No distress noted at this time. Call light in reach, will monitor.

## 2022-08-23 NOTE — PROGRESS NOTES
Patient resting in bed on room air. Respirations regular and unlabored. A&Ox4. L Chest port noted. Dressing C/D/I. Patient did not report any pain at this time or any other needs. Call bell within reach.

## 2022-08-24 VITALS
TEMPERATURE: 98.5 F | OXYGEN SATURATION: 97 % | DIASTOLIC BLOOD PRESSURE: 64 MMHG | RESPIRATION RATE: 16 BRPM | SYSTOLIC BLOOD PRESSURE: 120 MMHG | WEIGHT: 132.94 LBS | BODY MASS INDEX: 23.55 KG/M2 | HEART RATE: 82 BPM | HEIGHT: 63 IN

## 2022-08-24 PROBLEM — N17.9 AKI (ACUTE KIDNEY INJURY) (HCC): Status: RESOLVED | Noted: 2022-08-08 | Resolved: 2022-08-24

## 2022-08-24 LAB
ANION GAP SERPL CALC-SCNC: 1 MMOL/L (ref 7–16)
BUN SERPL-MCNC: 25 MG/DL (ref 8–23)
CALCIUM SERPL-MCNC: 8.3 MG/DL (ref 8.3–10.4)
CHLORIDE SERPL-SCNC: 104 MMOL/L (ref 98–107)
CO2 SERPL-SCNC: 31 MMOL/L (ref 21–32)
CREAT SERPL-MCNC: 1 MG/DL (ref 0.6–1)
GLUCOSE BLD STRIP.AUTO-MCNC: 189 MG/DL (ref 65–100)
GLUCOSE BLD STRIP.AUTO-MCNC: 223 MG/DL (ref 65–100)
GLUCOSE BLD STRIP.AUTO-MCNC: 274 MG/DL (ref 65–100)
GLUCOSE SERPL-MCNC: 80 MG/DL (ref 65–100)
POTASSIUM SERPL-SCNC: 4 MMOL/L (ref 3.5–5.1)
SERVICE CMNT-IMP: ABNORMAL
SODIUM SERPL-SCNC: 136 MMOL/L (ref 136–145)

## 2022-08-24 PROCEDURE — 6370000000 HC RX 637 (ALT 250 FOR IP): Performed by: INTERNAL MEDICINE

## 2022-08-24 PROCEDURE — 82962 GLUCOSE BLOOD TEST: CPT

## 2022-08-24 PROCEDURE — 6370000000 HC RX 637 (ALT 250 FOR IP): Performed by: PHYSICIAN ASSISTANT

## 2022-08-24 PROCEDURE — 6360000002 HC RX W HCPCS: Performed by: STUDENT IN AN ORGANIZED HEALTH CARE EDUCATION/TRAINING PROGRAM

## 2022-08-24 PROCEDURE — 80048 BASIC METABOLIC PNL TOTAL CA: CPT

## 2022-08-24 PROCEDURE — 2580000003 HC RX 258: Performed by: INTERNAL MEDICINE

## 2022-08-24 RX ORDER — DIPHENOXYLATE HYDROCHLORIDE AND ATROPINE SULFATE 2.5; .025 MG/1; MG/1
1 TABLET ORAL 4 TIMES DAILY PRN
Qty: 40 TABLET | Refills: 0
Start: 2022-08-24 | End: 2022-09-03

## 2022-08-24 RX ORDER — METHYLPREDNISOLONE SODIUM SUCCINATE 40 MG/ML
20 INJECTION, POWDER, LYOPHILIZED, FOR SOLUTION INTRAMUSCULAR; INTRAVENOUS EVERY 8 HOURS
Qty: 15 EACH | Refills: 0
Start: 2022-08-24 | End: 2022-09-03

## 2022-08-24 RX ORDER — INSULIN GLARGINE 100 [IU]/ML
12 INJECTION, SOLUTION SUBCUTANEOUS DAILY
Status: DISCONTINUED | OUTPATIENT
Start: 2022-08-25 | End: 2022-08-24 | Stop reason: HOSPADM

## 2022-08-24 RX ORDER — HYDROMORPHONE HYDROCHLORIDE 2 MG/1
1 TABLET ORAL EVERY 4 HOURS PRN
Qty: 9 TABLET | Refills: 0
Start: 2022-08-24 | End: 2022-08-27

## 2022-08-24 RX ORDER — INSULIN GLARGINE 100 [IU]/ML
12 INJECTION, SOLUTION SUBCUTANEOUS DAILY
Qty: 10 ML | Refills: 0
Start: 2022-08-25

## 2022-08-24 RX ADMIN — INSULIN LISPRO 1 UNITS: 100 INJECTION, SOLUTION INTRAVENOUS; SUBCUTANEOUS at 16:46

## 2022-08-24 RX ADMIN — NYSTATIN 500000 UNITS: 100000 SUSPENSION ORAL at 12:06

## 2022-08-24 RX ADMIN — HYOSCYAMINE SULFATE 125 MCG: 0.12 TABLET ORAL at 08:50

## 2022-08-24 RX ADMIN — NYSTATIN 500000 UNITS: 100000 SUSPENSION ORAL at 08:50

## 2022-08-24 RX ADMIN — DIPHENOXYLATE HYDROCHLORIDE AND ATROPINE SULFATE 1 TABLET: 2.5; .025 TABLET ORAL at 16:49

## 2022-08-24 RX ADMIN — PANTOPRAZOLE SODIUM 40 MG: 40 TABLET, DELAYED RELEASE ORAL at 06:50

## 2022-08-24 RX ADMIN — INSULIN LISPRO 2 UNITS: 100 INJECTION, SOLUTION INTRAVENOUS; SUBCUTANEOUS at 12:06

## 2022-08-24 RX ADMIN — METHYLPREDNISOLONE SODIUM SUCCINATE 20 MG: 40 INJECTION, POWDER, FOR SOLUTION INTRAMUSCULAR; INTRAVENOUS at 06:02

## 2022-08-24 RX ADMIN — HYDROMORPHONE HYDROCHLORIDE 2 MG: 2 TABLET ORAL at 14:12

## 2022-08-24 RX ADMIN — SODIUM CHLORIDE, PRESERVATIVE FREE 10 ML: 5 INJECTION INTRAVENOUS at 08:52

## 2022-08-24 RX ADMIN — NYSTATIN 500000 UNITS: 100000 SUSPENSION ORAL at 16:46

## 2022-08-24 RX ADMIN — BUPROPION HYDROCHLORIDE 150 MG: 150 TABLET, EXTENDED RELEASE ORAL at 08:50

## 2022-08-24 RX ADMIN — HYOSCYAMINE SULFATE 125 MCG: 0.12 TABLET ORAL at 12:06

## 2022-08-24 RX ADMIN — METHYLPREDNISOLONE SODIUM SUCCINATE 20 MG: 40 INJECTION, POWDER, FOR SOLUTION INTRAMUSCULAR; INTRAVENOUS at 12:04

## 2022-08-24 RX ADMIN — HYDROMORPHONE HYDROCHLORIDE 2 MG: 2 TABLET ORAL at 06:01

## 2022-08-24 RX ADMIN — DIPHENOXYLATE HYDROCHLORIDE AND ATROPINE SULFATE 1 TABLET: 2.5; .025 TABLET ORAL at 08:56

## 2022-08-24 RX ADMIN — INSULIN GLARGINE 9 UNITS: 100 INJECTION, SOLUTION SUBCUTANEOUS at 08:50

## 2022-08-24 RX ADMIN — SERTRALINE HYDROCHLORIDE 100 MG: 25 TABLET ORAL at 08:50

## 2022-08-24 ASSESSMENT — PAIN SCALES - GENERAL
PAINLEVEL_OUTOF10: 8
PAINLEVEL_OUTOF10: 8
PAINLEVEL_OUTOF10: 6
PAINLEVEL_OUTOF10: 9

## 2022-08-24 ASSESSMENT — PAIN DESCRIPTION - DESCRIPTORS: DESCRIPTORS: ACHING;CRAMPING

## 2022-08-24 ASSESSMENT — PAIN DESCRIPTION - LOCATION
LOCATION: ABDOMEN
LOCATION: ABDOMEN

## 2022-08-24 NOTE — PROGRESS NOTES
Pt sitting up in bed eating dinner. No distress note at this time. Call light in reach, will monitor.

## 2022-08-24 NOTE — PROGRESS NOTES
Patient resting in bed on room air. Respirations present and unlabored. A&Ox4. L Chest port noted with C/D/I dressing. Safety measures in place. No needs expressed at this time. Call bell within reach and encouraged to call with needs.

## 2022-08-24 NOTE — PROGRESS NOTES
Received info that patient was to transfer to room 689 at [facility]. Called report to Asmita Spencer RN for transfer and kennedy has been picked up by Samy. Her port remained accessed, and nurse was informed of this.

## 2022-08-24 NOTE — PROGRESS NOTES
Patient sitting on the side of the bed on room air. Respirations present and unlabored. A&Ox4. L Chest port with C/D/I dressing. Safety measures in place. Needs met. Call bell within reach and encouraged to call with needs. No acute events overnight. Preparing to give report to oncoming RN.

## 2022-08-24 NOTE — CARE COORDINATION
MSN CM:  patient waiting to be transferred to [facility] for surgical procedure. [facility] has no available beds at this time. Case management will continue to follow.

## 2022-08-24 NOTE — PROGRESS NOTES
[facility] Progress Note         Admit Date:  8/8/2022    Today's Date:  8/24/2022    CC:  Crohn's disease, abdominal pain, diarrhea    Subjective:     Patient is awaiting transfer. She reports diarrhea stools have returned, Lomotil was received with some relief. She denies abdominal pain, nausea, or vomiting. Tolerating her diet.      Medications:   Current Facility-Administered Medications   Medication Dose Route Frequency    HYDROmorphone (DILAUDID) tablet 1 mg  1 mg Oral Q4H PRN    HYDROmorphone (DILAUDID) tablet 2 mg  2 mg Oral Q8H PRN    diphenoxylate-atropine (LOMOTIL) 2.5-0.025 MG per tablet 1 tablet  1 tablet Oral 4x Daily PRN    insulin glargine (LANTUS) injection vial 9 Units  9 Units SubCUTAneous Daily    methylPREDNISolone sodium (SOLU-MEDROL) injection 20 mg  20 mg IntraVENous Q8H    potassium chloride 20 mEq/50 mL IVPB (Central Line)  20 mEq IntraVENous PRN    Or    potassium chloride 10 mEq/100 mL IVPB (Peripheral Line)  10 mEq IntraVENous PRN    magnesium sulfate 2000 mg in 50 mL IVPB premix  2,000 mg IntraVENous PRN    sodium phosphate 10 mmol in sodium chloride 0.9 % 250 mL IVPB  10 mmol IntraVENous PRN    Or    sodium phosphate 15 mmol in sodium chloride 0.9 % 250 mL IVPB  15 mmol IntraVENous PRN    Or    sodium phosphate 20 mmol in sodium chloride 0.9 % 500 mL IVPB  20 mmol IntraVENous PRN    diatrizoate meglumine-sodium (GASTROGRAFIN) 66-10 % solution 15 mL  15 mL Oral ONCE PRN    [Held by provider] ferrous sulfate (IRON 325) tablet 325 mg  325 mg Oral BID WC    melatonin tablet 9 mg  9 mg Oral Nightly    insulin lispro (HUMALOG) injection vial 0-4 Units  0-4 Units SubCUTAneous TID WC    insulin lispro (HUMALOG) injection vial 0-4 Units  0-4 Units SubCUTAneous Nightly    glucose chewable tablet 16 g  4 tablet Oral PRN    dextrose bolus 10% 125 mL  125 mL IntraVENous PRN    Or    dextrose bolus 10% 250 mL  250 mL IntraVENous PRN    glucagon (rDNA) injection 1 mg  1 mg SubCUTAneous PRN    dextrose 10 % infusion   IntraVENous Continuous PRN    hyoscyamine (LEVSIN/SL) sublingual tablet 125 mcg  125 mcg SubLINGual TID    buPROP[facility] [place] SR) extended release tablet 150 mg  150 mg Oral QAM    pantoprazole (PROTONIX) tablet 40 mg  40 mg Oral QAM AC    pravastatin (PRAVACHOL) tablet 80 mg  80 mg Oral Nightly    sertraline (ZOLOFT) tablet 100 mg  100 mg Oral Daily    sodium chloride flush 0.9 % injection 5-40 mL  5-40 mL IntraVENous 2 times per day    sodium chloride flush 0.9 % injection 5-40 mL  5-40 mL IntraVENous PRN    0.9 % sodium chloride infusion   IntraVENous PRN    ondansetron (ZOFRAN-ODT) disintegrating tablet 4 mg  4 mg Oral Q8H PRN    Or    ondansetron (ZOFRAN) injection 4 mg  4 mg IntraVENous Q6H PRN    polyethylene glycol (GLYCOLAX) packet 17 g  17 g Oral Daily PRN    acetaminophen (TYLENOL) tablet 650 mg  650 mg Oral Q6H PRN    Or    acetaminophen (TYLENOL) suppository 650 mg  650 mg Rectal Q6H PRN    nystatin (MYCOSTATIN) 477454 UNIT/ML suspension 500,000 Units  5 mL Oral 4x Daily    morphine injection 2 mg  2 mg IntraVENous Q4H PRN    rOPINIRole (REQUIP) tablet 0.5 mg  0.5 mg Oral Nightly       Review of Systems:  ROS was obtained, with pertinent positives as listed above. No chest pain or SOB. Diet:   Regular, GI bland, Low fiber     Objective:   Vitals:  /74   Pulse 82   Temp 98.1 °F (36.7 °C) (Oral)   Resp 18   Ht 5' 3\" (1.6 m)   Wt 136 lb 0.4 oz (61.7 kg)   SpO2 99%   BMI 24.10 kg/m²   Intake/Output:  No intake/output data recorded. 08/22 1901 - 08/24 0700  In: 150 [P.O.:140;  I.V.:10]  Out: -   Exam:  General appearance: alert, cooperative, no distress, lying in bed, left chest PAC  Lungs: non-labored breathing  Abdomen: non-distended  Extremities: extremities normal, atraumatic, no cyanosis or edema  Neuro:  alert and oriented    Data Review (Labs):    Recent Labs     08/22/22  0414 08/23/22  0729 08/24/22  0612    133* 136   K 4.0 4.2 4.0    105 104   CO2 26 29 31   BUN 33* 30* 25*     Name:    Oneida Butcher   Accession Number:   [id number]   MR #   [id number]   Date Obtained:   8/19/2022   DIAGNOSIS        A:  \"TI BIOPSIES\":             FRAGMENTS OF BENIGN SMALL BOWEL MUCOSA. B:  \"RIGHT COLON BIOPSIES\":             FRAGMENTS OF BENIGN COLONIC MUCOSA WITH OCCASIONAL LYMPHOID                  AGGREGATE. NEGATIVE FOR DYSPLASIA. C:  \"TRANSVERSE COLON BIOPSIES\":             FOCAL CHRONIC ACTIVE COLITIS. NEGATIVE FOR DYSPLASIA. D:  \"LEFT COLON BIOPSIES\":             FOCAL CHRONIC ACTIVE COLITIS. NEGATIVE FOR DYSPLASIA. Assessment:     Principal Problem:    JULIETH (acute kidney injury) ([facility] [address]  Active Problems:    Crohn's disease ([facility] [address]    Hypertension    Hyperlipidemia    Depression    Moderate protein-calorie malnutrition (HCC)    Iron deficiency anemia due to chronic blood loss    Hyperglycemia    RLS (restless legs syndrome)    CKD (chronic kidney disease) stage 3, GFR 30-59 ml/min (HCC)    Colitis    Anemia    Essential hypertension, benign  Resolved Problems:    Hyponatremia    Hyperkalemia    Acute hypokalemia    65 y.o. female with PMH including but not limited to crohns disease (lg and sm bowel), HTN, CKD3, depression, HLP, RLS, who we are following for Crohn's flare with abdominal cramping, nausea, rectal spasm, non-bloody diarrhea, dehydration, and wt loss. She has failed Remicade, Humira, Entyvio, Stelara. Currenlty on [facility] but not in remission. Recent Cdiff neg, Calprotectin >1200 and CT evidence of bowel thickening cw CD. Has electrolyte imbalance and JULIETH on admission. 8/19/22:  Colonoscopy with severe Crohn's colitis from hepatic flexure through rectum with perianal involvement, right side and TI was normal. Pathology results detailed above. Surgery consulted.      Has failed Remicade, Humira, Kevin Curie and Stelara     After surgery will need Skyrizi or another therapy, preferably not TNF inhibitor    Plan:     Transfer for surgery  Skyrizi after surgery  Maximize nutrition for the best surgical outcomes      Worthy Dais, APRN  [facility]     Patient is seen and examined in collaboration with Dr. Mikey Jeter. Assessment and plan as per Dr. William Stoddard.

## 2022-08-24 NOTE — PROGRESS NOTES
Hospitalist Progress Note   Admit Date:  2022  3:17 PM   Name:  Ronnell Rosa   Age:  65 y.o. Sex:  female  :  1960   MRN:  [id number]   Room:  80/    Presenting Complaint: Nausea     Reason(s) for Admission: Dehydration [E86.0]  Hypokalemia [E87.6]  JULIETH (acute kidney injury) ([facility] [address] [N17.9]  Diarrhea, unspecified type [R19.7]  Acute renal failure superimposed on chronic kidney disease, unspecified CKD stage, unspecified acute renal failure type ([address] [N17.9, N18.9]     Hospital Course & Interval History:     Please refer to the admission H&P for details of presentation. In summary, Ronnell Rosa is a 65 y.o. female with medical history significant for crohns disease, HTN, CKD3, depression, HLP who was admitted for several weeks of nausea, emesis and abdominal pain. GI was consulted due to concerns for crohns flare up. Patient was started on IV steroids. Patient symptoms improved initially but worsened after transitioning to PO steroids. CT A/P on  was done due to continued abdominal pain. It showed circumferential wall thickening of colon possibly suggestive of colitis, therefore she was started on cipro and flagyl. Patient also had multiple bouts of diarrhea but was unable to be verified by staff. Stool studies including C. difficile has been negative. Patient continues to have abdominal pain frequent episodes of diarrhea/BM although stool amount has decreased. IV steroids has been switched to p.o. steroids improvement in her symptoms. Underwent colonoscopy on  which showed severe Crohn colitis characterized by diffuse erythema, edema, deep ulceration obliteration of vascular pattern and cobblestoning beginning the hepatic flexure and extending into and involving the rectum. Surgery has been consulted for resection and they are recommending colorectal specialist due to extend of the disease.   She has been transitioned back to IV steroids and diet has been advanced to regular. , from GI, has contacted (who has seen her in the past) for treatment/dispo options. Subjective/24 hr Events (08/24/22) : Patient is seen and examined at bedside. No acute events reported overnight by nursing staff. Per transfer line, and that does not have any bed available today. We will need to check again later in the daytime. They do not have waiting list either and therefore will need to check daily regarding bed availability. Regular diet and has been tolerating. Feels like she gets full easily. Abdominal pain controlled on p.o. pain medication. Continues to have frequent bowel movements but improved. Patient denies fever, chills, chest pains, shortness of breath, n/v.    Review of Systems: 10 point review of systems is otherwise negative with the exception of the elements mentioned above.'        Assessment & Plan:     Crohn's disease with flare  Severe crohn's disease. Stool studies have been neg including c.diff. CT A/P on 8/13 with findings suggestive of colitis. Colonoscopy on 8/19 which showed severe Crohn colitis characterized by diffuse erythema, edema, deep ulceration obliteration of vascular pattern and cobblestoning beginning the hepatic flexure and extending into and involving the rectum. 08/24/22: stable on abdominal pain on IV steroids and pain regimen.   - GI recommendations appreciated. Randa Ruiz to accept patient to [facility]. - General Surgery recommending Colorectal specialist.   - continue on IV steroids  - Now on regular diet  *Pending [facility] transfer. No bed available and no wait list so we are calling twice daily to check bed availability. Once bed is available, will need to talk to admitting hospitalist team at [facility] for acceptance. (colorectal surgery) has accepted the patient. Hyperglycemia  08/24/22: FS continues to he elevated.  A1c of 6.1  - increase lantus to 12U qAM and titirate  - insulin sliding scale  - obtain FS 3 times daily prior to meals and before bedtime. JULIETH on CKD3  Creatinine peaked at 3.80 on 8/8 08/24/22: Cr stable at 1.00.  - encouraged PO intake. - monitor renal function    HTN  // HLD  08/24/22:  BP stable off anti-HTN meds  -Continue to hold and restart as needed  -Continue with home statin    Depression: continue with home wellbutrin and zoloft  RLS: continue with requip    Moderate Protein Calorie Malnutrition  - nutrition on board    Anemia, iron deficiency  - now on PO iron    Discharge Planning:  pending Surgical eval for colectomy given severe crohn's disease    Diet:  ADULT DIET; Regular; GI Ismay (GERD/Peptic Ulcer); Low Fiber; No red dye  ADULT ORAL NUTRITION SUPPLEMENT; Breakfast, Dinner, Lunch; Standard High Calorie/High Protein Oral Supplement  DVT PPx: SCDs  Code status: Full Code      I spent 35 minutes of time caring for this patient at bedside or nearby, and more than 50 percent of which was spent on coordination of care and/or patient/family counseling regarding the disease process, status , and treatment options/plan of care. Called transfer center to arrange for transfer to [facility].       Hospital Problems:  Principal Problem:    JULIETH (acute kidney injury) ([facility] [address]  Active Problems:    Crohn's disease ([facility] [address]    Hypertension    Hyperlipidemia    Depression    Moderate protein-calorie malnutrition (HCC)    Iron deficiency anemia due to chronic blood loss    Hyperglycemia    RLS (restless legs syndrome)    CKD (chronic kidney disease) stage 3, GFR 30-59 ml/min (HCC)    Colitis    Anemia    Essential hypertension, benign  Resolved Problems:    Hyponatremia    Hyperkalemia    Acute hypokalemia      Objective:   Patient Vitals for the past 24 hrs:   Temp Pulse Resp BP SpO2   08/24/22 1057 98.1 °F (36.7 °C) 82 18 113/74 99 %   08/24/22 0726 97.9 °F (36.6 °C) 84 18 112/63 98 %   08/24/22 0601 -- -- 18 -- --   08/24/22 0315 98.5 °F (36.9 °C) 71 16 123/81 99 %   08/23/22 2229 97.9 °F (36.6 °C) 81 16 128/67 97 %   08/23/22 1901 98.4 °F (36.9 °C) 83 16 109/64 98 %   08/23/22 1658 -- -- 18 -- --   08/23/22 1521 97.3 °F (36.3 °C) 79 18 119/74 98 %       Oxygen Therapy  SpO2: 99 %  Pulse via Oximetry: 87 beats per minute  Pulse Oximeter Device Mode: Continuous  Pulse Oximeter Device Location: Right, Finger  O2 Device: None (Room air)  Skin Assessment: Clean, dry, & intact  Oxygen Therapy: None (Room air)    Estimated body mass index is 24.1 kg/m² as calculated from the following:    Height as of this encounter: 5' 3\" (1.6 m). Weight as of this encounter: 136 lb 0.4 oz (61.7 kg). Intake/Output Summary (Last 24 hours) at 8/24/2022 1435  Last data filed at 8/23/2022 2119  Gross per 24 hour   Intake 150 ml   Output --   Net 150 ml         Physical Exam:     Blood pressure 113/74, pulse 82, temperature 98.1 °F (36.7 °C), temperature source Oral, resp. rate 18, height 5' 3\" (1.6 m), weight 136 lb 0.4 oz (61.7 kg), SpO2 99 %. General:    Alert and awake. Not in apparent distress  Head:  Normocephalic, atraumatic  Eyes:  Sclerae appear normal.  Pupils equally round. ENT:  Nares appear normal, no drainage. Moist oral mucosa  Neck:  No restricted ROM. Trachea midline   CV:   RRR. No m/r/g. No jugular venous distension. Lungs:   CTAB. No wheezing. Symmetric expansion. Abdomen: Bowel sounds present. Soft, nontender, nondistended. Extremities: No cyanosis or clubbing. No edema  Skin:     No rashes and normal coloration. Warm and dry. Neuro:  CN II-XII grossly intact. A&Ox3  Psych:  Normal mood and affect.       I have personally reviewed labs and tests showing:  Recent Labs:  Recent Results (from the past 48 hour(s))   POCT Glucose    Collection Time: 08/22/22  4:20 PM   Result Value Ref Range    POC Glucose 279 (H) 65 - 100 mg/dL    Performed by: SeleneEFFIE    POCT Glucose    Collection Time: 08/22/22  8:48 PM   Result Value Ref Range    POC Glucose 210 (H) 65 - 100 mg/dL    [facility]: [facility]    Basic Metabolic Panel    Collection Time: 08/23/22  7:29 AM   Result Value Ref Range    Sodium 133 (L) 136 - 145 mmol/L    Potassium 4.2 3.5 - 5.1 mmol/L    Chloride 105 98 - 107 mmol/L    CO2 29 21 - 32 mmol/L    Anion Gap Cannot be calculated 7 - 16 mmol/L    Glucose 152 (H) 65 - 100 mg/dL    BUN 30 (H) 8 - 23 MG/DL    Creatinine 1.00 0.6 - 1.0 MG/DL    GFR African American >60 >60 ml/min/1.73m2    GFR Non- 60 (L) >60 ml/min/1.73m2    Calcium 8.1 (L) 8.3 - 10.4 MG/DL   POCT Glucose    Collection Time: 08/23/22  7:39 AM   Result Value Ref Range    POC Glucose 191 (H) 65 - 100 mg/dL    Performed by: Tucker    POCT Glucose    Collection Time: 08/23/22 11:19 AM   Result Value Ref Range    POC Glucose 357 (H) 65 - 100 mg/dL    Performed by: REAL Naidu    POCT Glucose    Collection Time: 08/23/22  4:41 PM   Result Value Ref Range    POC Glucose 179 (H) 65 - 100 mg/dL    Performed by: Tamia Fitch    POCT Glucose    Collection Time: 08/23/22  8:49 PM   Result Value Ref Range    POC Glucose 309 (H) 65 - 100 mg/dL    Performed by: Joaquín    Basic Metabolic Panel    Collection Time: 08/24/22  6:12 AM   Result Value Ref Range    Sodium 136 136 - 145 mmol/L    Potassium 4.0 3.5 - 5.1 mmol/L    Chloride 104 98 - 107 mmol/L    CO2 31 21 - 32 mmol/L    Anion Gap 1 (L) 7 - 16 mmol/L    Glucose 80 65 - 100 mg/dL    BUN 25 (H) 8 - 23 MG/DL    Creatinine 1.00 0.6 - 1.0 MG/DL    GFR African American >60 >60 ml/min/1.73m2    GFR Non- 60 (L) >60 ml/min/1.73m2    Calcium 8.3 8.3 - 10.4 MG/DL   POCT Glucose    Collection Time: 08/24/22  7:27 AM   Result Value Ref Range    POC Glucose 189 (H) 65 - 100 mg/dL    Performed by: Tucker    POCT Glucose    Collection Time: 08/24/22 10:58 AM   Result Value Ref Range    POC Glucose 274 (H) 65 - 100 mg/dL    Performed by: REAL Pinzon 38        I have personally reviewed imaging studies showing:   Other Studies:  CT ABDOMEN PELVIS W IV CONTRAST Additional Contrast? Oral   Final Result   1. Circumferential wall thickening over a long segment of colon involving the   distal transverse, descending and sigmoid colon. The apparent thickening may be   accentuated by incomplete luminal distention. Colitis could be considered in the   setting of abdominal pain. 2. Hydropic gallbladder, no gallstones or obvious gallbladder wall thickening. CT ABDOMEN PELVIS RENAL STONE Additional Contrast? None   Final Result   1. No acute abdominal or pelvic abnormality noted on this limited examination   without the benefit of intravenous or oral contrast. Specifically, no renal or   ureteral stones present. There is no hydronephrosis or hydroureter. US RETROPERITONEAL COMPLETE   Final Result      Mild right hydronephrosis, of uncertain etiology. Otherwise, unremarkable   ultrasound of the kidneys.                 Current Meds:  Current Facility-Administered Medications   Medication Dose Route Frequency    HYDROmorphone (DILAUDID) tablet 1 mg  1 mg Oral Q4H PRN    HYDROmorphone (DILAUDID) tablet 2 mg  2 mg Oral Q8H PRN    diphenoxylate-atropine (LOMOTIL) 2.5-0.025 MG per tablet 1 tablet  1 tablet Oral 4x Daily PRN    insulin glargine (LANTUS) injection vial 9 Units  9 Units SubCUTAneous Daily    methylPREDNISolone sodium (SOLU-MEDROL) injection 20 mg  20 mg IntraVENous Q8H    potassium chloride 20 mEq/50 mL IVPB (Central Line)  20 mEq IntraVENous PRN    Or    potassium chloride 10 mEq/100 mL IVPB (Peripheral Line)  10 mEq IntraVENous PRN    magnesium sulfate 2000 mg in 50 mL IVPB premix  2,000 mg IntraVENous PRN    sodium phosphate 10 mmol in sodium chloride 0.9 % 250 mL IVPB  10 mmol IntraVENous PRN    Or    sodium phosphate 15 mmol in sodium chloride 0.9 % 250 mL IVPB  15 mmol IntraVENous PRN    Or    sodium phosphate 20 mmol in sodium chloride 0.9 % 500 mL IVPB  20 mmol IntraVENous PRN    diatrizoate meglumine-sodium (GASTROGRAFIN) 66-10 % solution 15 mL  15 mL Oral ONCE PRN    [Held by provider] ferrous sulfate (IRON 325) tablet 325 mg  325 mg Oral BID WC    melatonin tablet 9 mg  9 mg Oral Nightly    insulin lispro (HUMALOG) injection vial 0-4 Units  0-4 Units SubCUTAneous TID WC    insulin lispro (HUMALOG) injection vial 0-4 Units  0-4 Units SubCUTAneous Nightly    glucose chewable tablet 16 g  4 tablet Oral PRN    dextrose bolus 10% 125 mL  125 mL IntraVENous PRN    Or    dextrose bolus 10% 250 mL  250 mL IntraVENous PRN    glucagon (rDNA) injection 1 mg  1 mg SubCUTAneous PRN    dextrose 10 % infusion   IntraVENous Continuous PRN    hyoscyamine (LEVSIN/SL) sublingual tablet 125 mcg  125 mcg SubLINGual TID    buPROPion (WELLBUTRIN SR) extended release tablet 150 mg  150 mg Oral QAM    pantoprazole (PROTONIX) tablet 40 mg  40 mg Oral QAM AC    pravastatin (PRAVACHOL) tablet 80 mg  80 mg Oral Nightly    sertraline (ZOLOFT) tablet 100 mg  100 mg Oral Daily    sodium chloride flush 0.9 % injection 5-40 mL  5-40 mL IntraVENous 2 times per day    sodium chloride flush 0.9 % injection 5-40 mL  5-40 mL IntraVENous PRN    0.9 % sodium chloride infusion   IntraVENous PRN    ondansetron (ZOFRAN-ODT) disintegrating tablet 4 mg  4 mg Oral Q8H PRN    Or    ondansetron (ZOFRAN) injection 4 mg  4 mg IntraVENous Q6H PRN    polyethylene glycol (GLYCOLAX) packet 17 g  17 g Oral Daily PRN    acetaminophen (TYLENOL) tablet 650 mg  650 mg Oral Q6H PRN    Or    acetaminophen (TYLENOL) suppository 650 mg  650 mg Rectal Q6H PRN    nystatin (MYCOSTATIN) 027997 UNIT/ML suspension 500,000 Units  5 mL Oral 4x Daily    morphine injection 2 mg  2 mg IntraVENous Q4H PRN    rOPINIRole (REQUIP) tablet 0.5 mg  0.5 mg Oral Nightly       Signed:  Raul Hill MD    Part of this note may have been written by using a voice dictation software. The note has been proof read but may still contain some grammatical/other typographical errors.

## 2022-08-24 NOTE — PROGRESS NOTES
Pt resting in bed. Pt alert oriented times 3 at this time. Pt on RA. Pt complaints of abd pain 8/10 at this time. Pt reports \"having a pretty normal BM this am\" no acute distress noted at this time. Pt encouraged to call for assistance if needed call light in reach, will monitor.

## 2022-08-24 NOTE — DISCHARGE SUMMARY
Hospitalist Discharge Summary   Admit Date:  2022  3:17 PM   DC Note date: 2022  Name:  Jenny Peraza   Age:  65 y.o. Sex:  female  :  1960   MRN:  [id number]   Room:  [facility]  PCP:  PROVIDER UNKNOWN, MD    Presenting Complaint: Nausea     Initial Admission Diagnosis: Dehydration [E86.0]  Hypokalemia [E87.6]  JULIETH (acute kidney injury) ([address] [N17.9]  Diarrhea, unspecified type [R19.7]  Acute renal failure superimposed on chronic kidney disease, unspecified CKD stage, unspecified acute renal failure type ([address] [N17.9, N18.9]     Problem List for this Hospitalization (present on admission):    Principal Problem (Resolved):    JULIETH (acute kidney injury) ([address]  Active Problems:    Crohn's disease ([address]    Hypertension    Hyperlipidemia    Depression    Moderate protein-calorie malnutrition ([address]    Iron deficiency anemia due to chronic blood loss    Hyperglycemia    RLS (restless legs syndrome)    CKD (chronic kidney disease) stage 3, GFR 30-59 ml/min (HCC)    Colitis    Anemia    Essential hypertension, benign  Resolved Problems:    Hyponatremia    Hyperkalemia    Acute hypokalemia      Hospital Course:  Please refer to the admission H&P for details of presentation. In summary, Jenny Peraza is a 65 y.o. female with medical history significant for crohns disease, HTN, CKD3, depression, HLP who was admitted for several weeks of nausea, emesis and abdominal pain. GI was consulted due to concerns for crohns flare up. Patient was started on IV steroids. Patient symptoms improved initially but worsened after transitioning to PO steroids. CT A/P on  was done due to continued abdominal pain. It showed circumferential wall thickening of colon possibly suggestive of colitis, therefore she was started on cipro and flagyl. Patient also had multiple bouts of diarrhea but was unable to be verified by staff. Stool studies including C. difficile has been negative.    Patient continues to have abdominal pain frequent episodes of diarrhea/BM although stool amount has decreased. IV steroids has been switched to p.o. steroids improvement in her symptoms. Underwent colonoscopy on 8/19 which showed severe Crohn colitis characterized by diffuse erythema, edema, deep ulceration obliteration of vascular pattern and cobblestoning beginning the hepatic flexure and extending into and involving the rectum. Surgery has been consulted for resection and they are recommending colorectal specialist due to extend of the disease. She has been transitioned back to IV steroids and diet has been advanced to regular. , from GI, has contacted (who has seen her in the past). Patient is being transferred to [facility] for possible colon resection with . Hospitalist team from [facility] had agree to take on a primary and Trenna Brittle as consult. Patient is medically stable for discharge. Patient is to continue taking medications as prescribed and to follow up with PCP on discharge. Patient is instructed to to call a physician or return to ED if any concerns/symptoms worsened. Discharge summary and encounter summary was sent to PCP electronically via \"Comm [facility]gt\" link in [facility], if possible. Disposition: [facility] in [facility] [address] [facility] [address]: ADULT DIET; Regular; GI [place] (GERD/Peptic Ulcer); Low Fiber; No red dye  ADULT ORAL NUTRITION SUPPLEMENT; Breakfast, Dinner, Lunch; Standard High Calorie/High Protein Oral Supplement  Code Status: Full Code        Time spent in patient discharge and coordination 40 minutes. Plan was discussed with patient. All questions answered. Patient was stable at time of discharge. Instructions given to call a physician or return if any concerns. Current Discharge Medication List        START taking these medications    Details   HYDROmorphone (DILAUDID) 2 MG tablet Take 0.5 tablets by mouth every 4 hours as needed for Pain for up to 3 days.   Qty: 9 tablet, Refills: 0 Comments: Reduce doses taken as pain becomes manageable  Associated Diagnoses: Crohn's disease of large intestine with other complication (HCC)      insulin glargine (LANTUS) 100 UNIT/ML injection vial Inject 12 Units into the skin daily  Qty: 10 mL, Refills: 0      diphenoxylate-atropine (LOMOTIL) 2.5-0.025 MG per tablet Take 1 tablet by mouth 4 times daily as needed for Diarrhea for up to 10 days. Qty: 40 tablet, Refills: 0    Associated Diagnoses: Crohn's disease of large intestine with other complication (HCC)      methylPREDNISolone sodium (SOLU-MEDROL) 40 MG injection Infuse 0.5 mLs intravenously in the morning and 0.5 mLs at noon and 0.5 mLs in the evening. Do all this for 10 days. Qty: 15 each, Refills: 0           CONTINUE these medications which have NOT CHANGED    Details   buPROPion (WELLBUTRIN XL) 150 MG extended release tablet Take 150 mg by mouth every morning      traMADol (ULTRAM) 50 MG tablet Take 50 mg by mouth every 8 hours as needed for Pain. dexlansoprazole (DEXILANT) 60 MG CPDR delayed release capsule Take 60 mg by mouth      pravastatin (PRAVACHOL) 80 MG tablet Take 80 mg by mouth      rOPINIRole (REQUIP) 0.5 MG tablet Take 0.5 mg by mouth      sertraline (ZOLOFT) 100 MG tablet Take 100 mg by mouth      acetaminophen (TYLENOL) 650 MG extended release tablet Take 650 mg by mouth every 6 hours as needed      LORazepam (ATIVAN) 1 MG tablet Take 1 mg by mouth.       Probiotic Product (ACIDOPHILUS PROBIOTIC) CAPS capsule Take 1 tablet by mouth           STOP taking these medications       irbesartan-hydroCHLOROthiazide (AVALIDE) 150-12.5 MG per tablet Comments:   Reason for Stopping:         mesalamine (DELZICOL) 400 MG CPDR delayed release capsule Comments:   Reason for Stopping:         predniSONE 10 MG (21) TBPK Comments:   Reason for Stopping:         valsartan-hydroCHLOROthiazide (DIOVAN-HCT) 160-25 MG per tablet Comments:   Reason for Stopping:               Procedures done this admission:  Procedure(s):  COLONOSCOPY WITH BIOPSY    Consults this admission:  IP CONSULT TO NEPHROLOGY  IP CONSULT TO GI  IP CONSULT TO UROLOGY  IP CONSULT TO DIETITIAN  IP CONSULT TO DIETITIAN  FOLLOW UP VISIT  IP CONSULT TO GENERAL SURGERY    Echocardiogram results:  No results found for this or any previous visit. Diagnostic Imaging/Tests:   CT ABDOMEN PELVIS W IV CONTRAST Additional Contrast? Oral    Result Date: 8/13/2022  1. Circumferential wall thickening over a long segment of colon involving the distal transverse, descending and sigmoid colon. The apparent thickening may be accentuated by incomplete luminal distention. Colitis could be considered in the setting of abdominal pain. 2. Hydropic gallbladder, no gallstones or obvious gallbladder wall thickening. US RETROPERITONEAL COMPLETE    Result Date: 8/9/2022  Mild right hydronephrosis, of uncertain etiology. Otherwise, unremarkable ultrasound of the kidneys. CT ABDOMEN PELVIS RENAL STONE Additional Contrast? None    Result Date: 8/10/2022  1. No acute abdominal or pelvic abnormality noted on this limited examination without the benefit of intravenous or oral contrast. Specifically, no renal or ureteral stones present. There is no hydronephrosis or hydroureter. Recent Labs     08/09/22  1242 08/08/22  1830   CULTURE <10,000 COLONIES/mL MIXED SKIN ERI ISOLATED No Salmonella, Shigella, or Ecoli 0157 isolated.        Labs: Results:       BMP, Mg, Phos Recent Labs     08/22/22  0414 08/23/22  0729 08/24/22  0612    133* 136   K 4.0 4.2 4.0    105 104   CO2 26 29 31   ANIONGAP 6* Cannot be calculated 1*   BUN 33* 30* 25*   CREATININE 1.00 1.00 1.00   LABGLOM 60* 60* 60*   GFRAA >60 >60 >60   CALCIUM 7.7* 8.1* 8.3   GLUCOSE 151* 152* 80      CBC No results for input(s): WBC, RBC, HGB, HCT, MCV, MCH, MCHC, RDW, PLT, MPV, NRBC, SEGS, LYMPHOPCT, EOSRELPCT, MONOPCT, BASOPCT, IMMGRAN, SEGSABS, LYMPHSABS, EOSABS, MONOSABS, BASOSABS, ABSIMMGRAN in the last 72 hours. LFT No results for input(s): BILITOT, BILIDIR, ALKPHOS, AST, ALT, PROT, LABALBU, GLOB in the last 72 hours.    Cardiac  No results found for: NTPROBNP, TROPHS   Coags No results found for: PROTIME, INR, APTT   A1c Lab Results   Component Value Date/Time    LABA1C 6.1 08/19/2022 04:36 AM     08/19/2022 04:36 AM      Lipids Lab Results   Component Value Date/Time    TRIG 55 08/15/2022 04:25 AM      Thyroid  Lab Results   Component Value Date/Time    OWX8BTH 0.845 08/09/2022 03:52 PM        Most Recent UA Lab Results   Component Value Date/Time    COLORU YELLOW/STRAW 08/10/2022 12:40 PM    APPEARANCE CLEAR 08/10/2022 12:40 PM    SPECGRAV 1.013 08/10/2022 12:40 PM    LABPH 5.0 08/10/2022 12:40 PM    PROTEINU 30 08/10/2022 12:40 PM    GLUCOSEU Negative 08/10/2022 12:40 PM    KETUA Negative 08/10/2022 12:40 PM    BILIRUBINUR Negative 08/10/2022 12:40 PM    BLOODU SMALL 08/10/2022 12:40 PM    UROBILINOGEN 0.2 08/10/2022 12:40 PM    NITRU Negative 08/10/2022 12:40 PM    LEUKOCYTESUR Negative 08/10/2022 12:40 PM    WBCUA 0-4 08/10/2022 12:40 PM    RBCUA 0-5 08/10/2022 12:40 PM    EPITHUA 0-5 08/10/2022 12:40 PM    BACTERIA Negative 08/10/2022 12:40 PM    LABCAST 2-5 08/10/2022 12:40 PM    MUCUS 0 08/10/2022 12:40 PM          All Labs from Last 24 Hrs:  Recent Results (from the past 24 hour(s))   POCT Glucose    Collection Time: 08/23/22  8:49 PM   Result Value Ref Range    POC Glucose 309 (H) 65 - 100 mg/dL    Performed by: Joaquín    Basic Metabolic Panel    Collection Time: 08/24/22  6:12 AM   Result Value Ref Range    Sodium 136 136 - 145 mmol/L    Potassium 4.0 3.5 - 5.1 mmol/L    Chloride 104 98 - 107 mmol/L    CO2 31 21 - 32 mmol/L    Anion Gap 1 (L) 7 - 16 mmol/L    Glucose 80 65 - 100 mg/dL    BUN 25 (H) 8 - 23 MG/DL    Creatinine 1.00 0.6 - 1.0 MG/DL    GFR African American >60 >60 ml/min/1.73m2    GFR Non- 60 (L) >60 ml/min/1.73m2    Calcium 8.3 8.3 - 10.4 MG/DL   POCT Glucose    Collection Time: 08/24/22  7:27 AM   Result Value Ref Range    POC Glucose 189 (H) 65 - 100 mg/dL    Performed by: Tucker    POCT Glucose    Collection Time: 08/24/22 10:58 AM   Result Value Ref Range    POC Glucose 274 (H) 65 - 100 mg/dL    Performed by: Tucker    POCT Glucose    Collection Time: 08/24/22  4:09 PM   Result Value Ref Range    POC Glucose 223 (H) 65 - 100 mg/dL    Performed by: Tucker        Allergies   Allergen Reactions    Codeine Nausea Only       There is no immunization history on file for this patient. Recent Vital Data:  Patient Vitals for the past 24 hrs:   Temp Pulse Resp BP SpO2   08/24/22 1438 97.5 °F (36.4 °C) 100 18 123/74 99 %   08/24/22 1057 98.1 °F (36.7 °C) 82 18 113/74 99 %   08/24/22 0726 97.9 °F (36.6 °C) 84 18 112/63 98 %   08/24/22 0601 -- -- 18 -- --   08/24/22 0315 98.5 °F (36.9 °C) 71 16 123/81 99 %   08/23/22 2229 97.9 °F (36.6 °C) 81 16 128/67 97 %   08/23/22 1901 98.4 °F (36.9 °C) 83 16 109/64 98 %       Oxygen Therapy  SpO2: 99 %  Pulse via Oximetry: 87 beats per minute  Pulse Oximeter Device Mode: Continuous  Pulse Oximeter Device Location: Right, Finger  O2 Device: None (Room air)  Skin Assessment: Clean, dry, & intact  Oxygen Therapy: None (Room air)    Estimated body mass index is 24.1 kg/m² as calculated from the following:    Height as of this encounter: 5' 3\" (1.6 m). Weight as of this encounter: 136 lb 0.4 oz (61.7 kg). Intake/Output Summary (Last 24 hours) at 8/24/2022 1315  Last data filed at 8/23/2022 2119  Gross per 24 hour   Intake 10 ml   Output --   Net 10 ml         Physical Exam:    General:          Alert and awake. Not in apparent distress  Head:               Normocephalic, atraumatic  Eyes:               Sclerae appear normal.  Pupils equally round. ENT:                Nares appear normal, no drainage. Moist oral mucosa  Neck:               No restricted ROM.   Trachea midline   CV: RRR.  No m/r/g. No jugular venous distension. Lungs:             CTAB. No wheezing. Symmetric expansion. Abdomen: Bowel sounds present. Soft, nontender, nondistended. Extremities:     No cyanosis or clubbing. No edema  Skin:                No rashes and normal coloration. Warm and dry. Neuro:             CN II-XII grossly intact. A&Ox3  Psych:             Normal mood and affect. Signed:  Blas Vaughn MD    Part of this note may have been written by using a voice dictation software. The note has been proof read but may still contain some grammatical/other typographical errors.

## 2023-09-03 NOTE — PROGRESS NOTES
Patient resting in bed on room air. A&Ox4. Respirations even and unlabored. Patient reports pain, dilaudid given, see MAR. In no acute distress at this time. Patient had 3 small BMs, totaling 20mL. PPN infusing at 85mL/hr. No needs expressed. No acute events overnight. Call light within reach, will continue to monitor. Preparing to give report to oncoming RN. Opt out

## (undated) DEVICE — SYRINGE MED 10ML LUERLOCK TIP W/O SFTY DISP

## (undated) DEVICE — LUBE JELLY FOIL PACK 1.4 OZ: Brand: MEDLINE INDUSTRIES, INC.

## (undated) DEVICE — SINGLE PORT MANIFOLD: Brand: NEPTUNE 2

## (undated) DEVICE — SYRINGE MED 3ML CLR PLAS STD N CTRL LUERLOCK TIP DISP

## (undated) DEVICE — CONNECTOR TBNG OD5-7MM O2 END DISP

## (undated) DEVICE — NEEDLE SYR 18GA L1.5IN RED PLAS HUB S STL BLNT FILL W/O

## (undated) DEVICE — GAUZE,SPONGE,4"X4",12PLY,WOVEN,NS,LF: Brand: MEDLINE

## (undated) DEVICE — CANNULA NSL ORAL AD FOR CAPNOFLEX CO2 O2 AIRLFE

## (undated) DEVICE — CONTAINER FORMALIN PREFILLED 10% NBF 60ML

## (undated) DEVICE — FORCEPS BX L240CM JAW DIA2.8MM L CAP W/ NDL MIC MESH TOOTH

## (undated) DEVICE — AIRLIFE™ OXYGEN TUBING 7 FEET (2.1 M) CRUSH RESISTANT OXYGEN TUBING, VINYL TIPPED: Brand: AIRLIFE™

## (undated) DEVICE — SYRINGE, LUER SLIP, STERILE, 60ML: Brand: MEDLINE

## (undated) DEVICE — YANKAUER,BULB TIP,W/O VENT,RIGID,STERILE: Brand: MEDLINE

## (undated) DEVICE — KENDALL RADIOLUCENT FOAM MONITORING ELECTRODE RECTANGULAR SHAPE: Brand: KENDALL